# Patient Record
Sex: FEMALE | Race: BLACK OR AFRICAN AMERICAN | NOT HISPANIC OR LATINO | URBAN - METROPOLITAN AREA
[De-identification: names, ages, dates, MRNs, and addresses within clinical notes are randomized per-mention and may not be internally consistent; named-entity substitution may affect disease eponyms.]

---

## 2019-07-24 ENCOUNTER — EMERGENCY (EMERGENCY)
Facility: HOSPITAL | Age: 74
LOS: 1 days | Discharge: HOME | End: 2019-07-24
Attending: EMERGENCY MEDICINE
Payer: MEDICARE

## 2019-07-24 VITALS
TEMPERATURE: 99 F | SYSTOLIC BLOOD PRESSURE: 181 MMHG | HEIGHT: 68 IN | WEIGHT: 190.04 LBS | DIASTOLIC BLOOD PRESSURE: 97 MMHG | HEART RATE: 71 BPM | RESPIRATION RATE: 20 BRPM | OXYGEN SATURATION: 99 %

## 2019-07-24 LAB
ALBUMIN SERPL ELPH-MCNC: 4 G/DL — SIGNIFICANT CHANGE UP (ref 3.3–5.2)
ALP SERPL-CCNC: 55 U/L — SIGNIFICANT CHANGE UP (ref 40–120)
ALT FLD-CCNC: 11 U/L — SIGNIFICANT CHANGE UP
ANION GAP SERPL CALC-SCNC: 12 MMOL/L — SIGNIFICANT CHANGE UP (ref 5–17)
APTT BLD: 31.1 SEC — SIGNIFICANT CHANGE UP (ref 27.5–36.3)
AST SERPL-CCNC: 21 U/L — SIGNIFICANT CHANGE UP
BASOPHILS # BLD AUTO: 0.02 K/UL — SIGNIFICANT CHANGE UP (ref 0–0.2)
BASOPHILS NFR BLD AUTO: 0.5 % — SIGNIFICANT CHANGE UP (ref 0–2)
BILIRUB SERPL-MCNC: <0.2 MG/DL — LOW (ref 0.4–2)
BUN SERPL-MCNC: 22 MG/DL — HIGH (ref 8–20)
CALCIUM SERPL-MCNC: 9.1 MG/DL — SIGNIFICANT CHANGE UP (ref 8.6–10.2)
CHLORIDE SERPL-SCNC: 103 MMOL/L — SIGNIFICANT CHANGE UP (ref 98–107)
CO2 SERPL-SCNC: 25 MMOL/L — SIGNIFICANT CHANGE UP (ref 22–29)
CREAT SERPL-MCNC: 0.91 MG/DL — SIGNIFICANT CHANGE UP (ref 0.5–1.3)
EOSINOPHIL # BLD AUTO: 0.07 K/UL — SIGNIFICANT CHANGE UP (ref 0–0.5)
EOSINOPHIL NFR BLD AUTO: 1.8 % — SIGNIFICANT CHANGE UP (ref 0–6)
GLUCOSE SERPL-MCNC: 113 MG/DL — SIGNIFICANT CHANGE UP (ref 70–115)
HCT VFR BLD CALC: 35.4 % — SIGNIFICANT CHANGE UP (ref 34.5–45)
HGB BLD-MCNC: 11.1 G/DL — LOW (ref 11.5–15.5)
IMM GRANULOCYTES NFR BLD AUTO: 0 % — SIGNIFICANT CHANGE UP (ref 0–1.5)
INR BLD: 0.95 RATIO — SIGNIFICANT CHANGE UP (ref 0.88–1.16)
LYMPHOCYTES # BLD AUTO: 1.71 K/UL — SIGNIFICANT CHANGE UP (ref 1–3.3)
LYMPHOCYTES # BLD AUTO: 43.1 % — SIGNIFICANT CHANGE UP (ref 13–44)
MAGNESIUM SERPL-MCNC: 1.8 MG/DL — SIGNIFICANT CHANGE UP (ref 1.6–2.6)
MCHC RBC-ENTMCNC: 28.2 PG — SIGNIFICANT CHANGE UP (ref 27–34)
MCHC RBC-ENTMCNC: 31.4 GM/DL — LOW (ref 32–36)
MCV RBC AUTO: 89.8 FL — SIGNIFICANT CHANGE UP (ref 80–100)
MONOCYTES # BLD AUTO: 0.5 K/UL — SIGNIFICANT CHANGE UP (ref 0–0.9)
MONOCYTES NFR BLD AUTO: 12.6 % — SIGNIFICANT CHANGE UP (ref 2–14)
NEUTROPHILS # BLD AUTO: 1.67 K/UL — LOW (ref 1.8–7.4)
NEUTROPHILS NFR BLD AUTO: 42 % — LOW (ref 43–77)
NT-PROBNP SERPL-SCNC: 120 PG/ML — SIGNIFICANT CHANGE UP (ref 0–300)
PLATELET # BLD AUTO: 194 K/UL — SIGNIFICANT CHANGE UP (ref 150–400)
POTASSIUM SERPL-MCNC: 3.7 MMOL/L — SIGNIFICANT CHANGE UP (ref 3.5–5.3)
POTASSIUM SERPL-SCNC: 3.7 MMOL/L — SIGNIFICANT CHANGE UP (ref 3.5–5.3)
PROT SERPL-MCNC: 7.4 G/DL — SIGNIFICANT CHANGE UP (ref 6.6–8.7)
PROTHROM AB SERPL-ACNC: 10.9 SEC — SIGNIFICANT CHANGE UP (ref 10–12.9)
RBC # BLD: 3.94 M/UL — SIGNIFICANT CHANGE UP (ref 3.8–5.2)
RBC # FLD: 13 % — SIGNIFICANT CHANGE UP (ref 10.3–14.5)
SODIUM SERPL-SCNC: 140 MMOL/L — SIGNIFICANT CHANGE UP (ref 135–145)
TROPONIN T SERPL-MCNC: <0.01 NG/ML — SIGNIFICANT CHANGE UP (ref 0–0.06)
WBC # BLD: 3.97 K/UL — SIGNIFICANT CHANGE UP (ref 3.8–10.5)
WBC # FLD AUTO: 3.97 K/UL — SIGNIFICANT CHANGE UP (ref 3.8–10.5)

## 2019-07-24 PROCEDURE — 74174 CTA ABD&PLVS W/CONTRAST: CPT | Mod: 26

## 2019-07-24 PROCEDURE — 71275 CT ANGIOGRAPHY CHEST: CPT | Mod: 26

## 2019-07-24 PROCEDURE — 93010 ELECTROCARDIOGRAM REPORT: CPT

## 2019-07-24 PROCEDURE — 71045 X-RAY EXAM CHEST 1 VIEW: CPT | Mod: 26

## 2019-07-24 PROCEDURE — 99285 EMERGENCY DEPT VISIT HI MDM: CPT

## 2019-07-24 NOTE — ED ADULT NURSE NOTE - OBJECTIVE STATEMENT
pt BIBA for reports of sudden onset chest pain PTA radiating to her shoulders. on arrival pt reports pain has gone away. no cardiac hx, denies stents. pt hypertensive, hx of, took meds today. no SOB, no fevers, even and unlabored resps present on exam. denies epigastric pain, denies nausea/vomiting.

## 2019-07-24 NOTE — ED PROVIDER NOTE - OBJECTIVE STATEMENT
Pt is a 74 yo F co chest/back pain.  PMHx significant for htn. Pt states that she was laying down this evening when she had sudden onset of sharp stabbing back pain that radiated to her chest. Pt states that the pain lasted approx. 30 min and resolved spontaneously. Pain was associated with R arm weakness/heaviness. Pt states that for the past 2 months she has had intermittent sob. no n/v. no fever/chills. no cough. no other complaints.

## 2019-07-24 NOTE — ED PROVIDER NOTE - CLINICAL SUMMARY MEDICAL DECISION MAKING FREE TEXT BOX
labs and imaging reviewed.  no dissection on CT.  case d/w LISET [place].  will put in cdu for serial enzymes and cardio consult.

## 2019-07-24 NOTE — ED ADULT TRIAGE NOTE - CHIEF COMPLAINT QUOTE
pt a+ox3, BIBA c/o chest pain. pt states she was laying on the couch when sudden onset of upper back pain radiating around chest occur. pt states pain has since subsided, denies SOB or diff breathing.

## 2019-07-24 NOTE — ED PROVIDER NOTE - NS ED ROS FT
No fever/chills, No photophobia/eye pain/changes in vision, No ear pain/sore throat/dysphagia, (+) chest pain no palpitations, no SOB/cough/wheeze/stridor, No abdominal pain, No N/V/D, no dysuria/frequency/discharge, No neck pain (+)back pain, no rash, (+) R arm weakness

## 2019-07-25 VITALS
SYSTOLIC BLOOD PRESSURE: 153 MMHG | OXYGEN SATURATION: 97 % | HEART RATE: 69 BPM | DIASTOLIC BLOOD PRESSURE: 67 MMHG | TEMPERATURE: 99 F | RESPIRATION RATE: 18 BRPM

## 2019-07-25 LAB
TROPONIN T SERPL-MCNC: <0.01 NG/ML — SIGNIFICANT CHANGE UP (ref 0–0.06)
TROPONIN T SERPL-MCNC: <0.01 NG/ML — SIGNIFICANT CHANGE UP (ref 0–0.06)

## 2019-07-25 PROCEDURE — 93010 ELECTROCARDIOGRAM REPORT: CPT | Mod: 76

## 2019-07-25 PROCEDURE — A9500: CPT

## 2019-07-25 PROCEDURE — C8929: CPT

## 2019-07-25 PROCEDURE — 99285 EMERGENCY DEPT VISIT HI MDM: CPT | Mod: 25

## 2019-07-25 PROCEDURE — 71275 CT ANGIOGRAPHY CHEST: CPT

## 2019-07-25 PROCEDURE — 94660 CPAP INITIATION&MGMT: CPT

## 2019-07-25 PROCEDURE — 85610 PROTHROMBIN TIME: CPT

## 2019-07-25 PROCEDURE — 78452 HT MUSCLE IMAGE SPECT MULT: CPT | Mod: 26

## 2019-07-25 PROCEDURE — 84484 ASSAY OF TROPONIN QUANT: CPT

## 2019-07-25 PROCEDURE — 80053 COMPREHEN METABOLIC PANEL: CPT

## 2019-07-25 PROCEDURE — 99285 EMERGENCY DEPT VISIT HI MDM: CPT

## 2019-07-25 PROCEDURE — G0378: CPT

## 2019-07-25 PROCEDURE — 93005 ELECTROCARDIOGRAM TRACING: CPT

## 2019-07-25 PROCEDURE — 83880 ASSAY OF NATRIURETIC PEPTIDE: CPT

## 2019-07-25 PROCEDURE — 36415 COLL VENOUS BLD VENIPUNCTURE: CPT

## 2019-07-25 PROCEDURE — 93018 CV STRESS TEST I&R ONLY: CPT

## 2019-07-25 PROCEDURE — 78452 HT MUSCLE IMAGE SPECT MULT: CPT

## 2019-07-25 PROCEDURE — 93306 TTE W/DOPPLER COMPLETE: CPT | Mod: 26

## 2019-07-25 PROCEDURE — 85730 THROMBOPLASTIN TIME PARTIAL: CPT

## 2019-07-25 PROCEDURE — 93016 CV STRESS TEST SUPVJ ONLY: CPT

## 2019-07-25 PROCEDURE — 71045 X-RAY EXAM CHEST 1 VIEW: CPT

## 2019-07-25 PROCEDURE — 85027 COMPLETE CBC AUTOMATED: CPT

## 2019-07-25 PROCEDURE — 93017 CV STRESS TEST TRACING ONLY: CPT

## 2019-07-25 PROCEDURE — 99234 HOSP IP/OBS SM DT SF/LOW 45: CPT

## 2019-07-25 PROCEDURE — 74174 CTA ABD&PLVS W/CONTRAST: CPT

## 2019-07-25 PROCEDURE — 83735 ASSAY OF MAGNESIUM: CPT

## 2019-07-25 RX ORDER — MONTELUKAST 4 MG/1
10 TABLET, CHEWABLE ORAL DAILY
Refills: 0 | Status: DISCONTINUED | OUTPATIENT
Start: 2019-07-25 | End: 2019-07-29

## 2019-07-25 RX ORDER — HYDROCHLOROTHIAZIDE 25 MG
25 TABLET ORAL DAILY
Refills: 0 | Status: DISCONTINUED | OUTPATIENT
Start: 2019-07-25 | End: 2019-07-29

## 2019-07-25 RX ADMIN — MONTELUKAST 10 MILLIGRAM(S): 4 TABLET, CHEWABLE ORAL at 15:29

## 2019-07-25 RX ADMIN — Medication 25 MILLIGRAM(S): at 07:32

## 2019-07-25 NOTE — ED ADULT NURSE REASSESSMENT NOTE - NS ED NURSE REASSESS COMMENT FT1
pt taken to CT at this time. pt with no change in status since arrival. no reports of pain.
pt to stay in observation overnight. aware of plan of care. IV site patent, repeat labs to be drawn. pt understanding of plan of care. no distress, even and unlabored resps present. skin warm dry and intact. AOX3, report handed off to NESTOR, RN at this time. assumed pt care.
verbal report received from ED RN [place], pt now in nuclear medicine, awaiting arrival to unit
Handoff received from offgoing RN. Charting as noted. Pt. received AxOx4, resting in stretcher, in NSR on cardiac monitor. Pt. in NAD. Vital signs stable and as charted. Pt. currently under OBSERVATION in ED, pt aware of current status. Pt. requesting food and PO fluids, as per LISET Graves to keep pt NPO pending possible AM stress test. Pt. verbalized understanding. Pt. offers no medical complaints at this time. Pt. safety and comfort measures maintained.
Pt received in the stretcher resting comfortably, non more chest pain reported, awaiting stress test this am, pt instructed to maintained NPO status , VSS, no distress noted, will continue to monitor

## 2019-07-25 NOTE — CONSULT NOTE ADULT - ASSESSMENT
73F hx HTN presented to ED with c/o R shoulder pain started last night involved back of her shoulder, was associated with R arm weakness, then pain evolved into anterior aspect of her chest to R and mid center, was severe, felt like sharp, lasted 10-15 minutes, was nonexertional, pt called EMS and brought to ED. Denied associated dyspnea orthopnea, palpitation sweating, nausea, vomiting Denied modifying factors. Pt states she had similar episode in 2015 and had PARNELL which was unrevealing Denied fever, chills, cough, dyspnea, orthopnea PND, edema, palpitation nausea, vomiting, hematuria, melena, syncope near syncope.    Chest Pain  EKG No acute changes  Trop neg  CTA no aortic dissection  Echo and NST today      HTN   Monitor BP  Cont meds

## 2019-07-25 NOTE — ED ADULT NURSE REASSESSMENT NOTE - COMFORT CARE
ambulated to bathroom
plan of care explained/wait time explained
plan of care explained/repositioned/treatment delay explained/wait time explained/warm blanket provided/assisted to bathroom

## 2019-07-25 NOTE — ED ADULT NURSE REASSESSMENT NOTE - GENERAL PATIENT STATE
improvement verbalized/smiling/interactive/comfortable appearance/cooperative/resting/sleeping
comfortable appearance

## 2019-07-25 NOTE — ED CDU PROVIDER DISPOSITION NOTE - CLINICAL COURSE
Pt is a 74 yo F co chest/back pain.  PMHx significant for htn. Pt states that she was laying down last evening when she had sudden onset of sharp stabbing back pain that radiated to her chest. Pt states that the pain lasted approx. 30 min and resolved spontaneously. Pain was associated with R arm weakness/heaviness. Pt states that for the past 2 months she has had intermittent sob.   Troponin negative x 3, TTE no significant valve disease, NST no ischemia, CTA no dissection.  Will d/c home f/u cardiology.
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
## 2019-07-25 NOTE — ED CDU PROVIDER INITIAL DAY NOTE - ATTENDING CONTRIBUTION TO CARE
I performed a face to face history and physical exam of the patient and discussed their management with the resident/ACP. I reviewed the resident/ACP's note and agree with the documented findings and plan of care.    Pt with atypical chest pain starting this morning.  initial workup negative.  will put in cdu for serial enzymes and cardio consult.

## 2019-07-25 NOTE — ED CDU PROVIDER INITIAL DAY NOTE - MEDICAL DECISION MAKING DETAILS
74 yo F co chest/back pain.  PMHx significant for htn. no active chest pain at this time, trend trops, cta neg, ss cardio consult in am

## 2020-07-29 ENCOUNTER — EMERGENCY (EMERGENCY)
Facility: HOSPITAL | Age: 75
LOS: 1 days | Discharge: HOME | End: 2020-07-29
Attending: EMERGENCY MEDICINE
Payer: MEDICARE

## 2020-07-29 VITALS
OXYGEN SATURATION: 97 % | DIASTOLIC BLOOD PRESSURE: 80 MMHG | HEART RATE: 82 BPM | SYSTOLIC BLOOD PRESSURE: 167 MMHG | TEMPERATURE: 98 F | RESPIRATION RATE: 18 BRPM

## 2020-07-29 VITALS
OXYGEN SATURATION: 100 % | HEART RATE: 82 BPM | TEMPERATURE: 98 F | RESPIRATION RATE: 18 BRPM | SYSTOLIC BLOOD PRESSURE: 161 MMHG | DIASTOLIC BLOOD PRESSURE: 77 MMHG

## 2020-07-29 LAB
ALBUMIN SERPL ELPH-MCNC: 4.1 G/DL — SIGNIFICANT CHANGE UP (ref 3.3–5.2)
ALP SERPL-CCNC: 55 U/L — SIGNIFICANT CHANGE UP (ref 40–120)
ALT FLD-CCNC: 15 U/L — SIGNIFICANT CHANGE UP
ANION GAP SERPL CALC-SCNC: 11 MMOL/L — SIGNIFICANT CHANGE UP (ref 5–17)
ANISOCYTOSIS BLD QL: SLIGHT — SIGNIFICANT CHANGE UP
APTT BLD: 30.8 SEC — SIGNIFICANT CHANGE UP (ref 27.5–35.5)
AST SERPL-CCNC: 27 U/L — SIGNIFICANT CHANGE UP
BASOPHILS # BLD AUTO: 0 K/UL — SIGNIFICANT CHANGE UP (ref 0–0.2)
BASOPHILS NFR BLD AUTO: 0 % — SIGNIFICANT CHANGE UP (ref 0–2)
BILIRUB SERPL-MCNC: 0.2 MG/DL — LOW (ref 0.4–2)
BUN SERPL-MCNC: 23 MG/DL — HIGH (ref 8–20)
CALCIUM SERPL-MCNC: 9.5 MG/DL — SIGNIFICANT CHANGE UP (ref 8.6–10.2)
CHLORIDE SERPL-SCNC: 101 MMOL/L — SIGNIFICANT CHANGE UP (ref 98–107)
CO2 SERPL-SCNC: 28 MMOL/L — SIGNIFICANT CHANGE UP (ref 22–29)
CREAT SERPL-MCNC: 1.12 MG/DL — SIGNIFICANT CHANGE UP (ref 0.5–1.3)
ELLIPTOCYTES BLD QL SMEAR: SLIGHT — SIGNIFICANT CHANGE UP
EOSINOPHIL # BLD AUTO: 0.06 K/UL — SIGNIFICANT CHANGE UP (ref 0–0.5)
EOSINOPHIL NFR BLD AUTO: 2 % — SIGNIFICANT CHANGE UP (ref 0–6)
GLUCOSE SERPL-MCNC: 102 MG/DL — HIGH (ref 70–99)
HCT VFR BLD CALC: 36 % — SIGNIFICANT CHANGE UP (ref 34.5–45)
HGB BLD-MCNC: 11.4 G/DL — LOW (ref 11.5–15.5)
INR BLD: 0.97 RATIO — SIGNIFICANT CHANGE UP (ref 0.88–1.16)
LYMPHOCYTES # BLD AUTO: 1.5 K/UL — SIGNIFICANT CHANGE UP (ref 1–3.3)
LYMPHOCYTES # BLD AUTO: 48 % — HIGH (ref 13–44)
MAGNESIUM SERPL-MCNC: 1.9 MG/DL — SIGNIFICANT CHANGE UP (ref 1.6–2.6)
MCHC RBC-ENTMCNC: 28.5 PG — SIGNIFICANT CHANGE UP (ref 27–34)
MCHC RBC-ENTMCNC: 31.7 GM/DL — LOW (ref 32–36)
MCV RBC AUTO: 90 FL — SIGNIFICANT CHANGE UP (ref 80–100)
MONOCYTES # BLD AUTO: 0.34 K/UL — SIGNIFICANT CHANGE UP (ref 0–0.9)
MONOCYTES NFR BLD AUTO: 11 % — SIGNIFICANT CHANGE UP (ref 2–14)
NEUTROPHILS # BLD AUTO: 1.19 K/UL — LOW (ref 1.8–7.4)
NEUTROPHILS NFR BLD AUTO: 38 % — LOW (ref 43–77)
NRBC # BLD: 0 /100 — SIGNIFICANT CHANGE UP (ref 0–0)
PLAT MORPH BLD: NORMAL — SIGNIFICANT CHANGE UP
PLATELET # BLD AUTO: 215 K/UL — SIGNIFICANT CHANGE UP (ref 150–400)
POIKILOCYTOSIS BLD QL AUTO: SLIGHT — SIGNIFICANT CHANGE UP
POTASSIUM SERPL-MCNC: 4.1 MMOL/L — SIGNIFICANT CHANGE UP (ref 3.5–5.3)
POTASSIUM SERPL-SCNC: 4.1 MMOL/L — SIGNIFICANT CHANGE UP (ref 3.5–5.3)
PROT SERPL-MCNC: 7.5 G/DL — SIGNIFICANT CHANGE UP (ref 6.6–8.7)
PROTHROM AB SERPL-ACNC: 11.3 SEC — SIGNIFICANT CHANGE UP (ref 10.6–13.6)
RBC # BLD: 4 M/UL — SIGNIFICANT CHANGE UP (ref 3.8–5.2)
RBC # FLD: 12.7 % — SIGNIFICANT CHANGE UP (ref 10.3–14.5)
RBC BLD AUTO: ABNORMAL
SODIUM SERPL-SCNC: 140 MMOL/L — SIGNIFICANT CHANGE UP (ref 135–145)
TROPONIN T SERPL-MCNC: <0.01 NG/ML — SIGNIFICANT CHANGE UP (ref 0–0.06)
VARIANT LYMPHS # BLD: 1 % — SIGNIFICANT CHANGE UP (ref 0–6)
WBC # BLD: 3.13 K/UL — LOW (ref 3.8–10.5)
WBC # FLD AUTO: 3.13 K/UL — LOW (ref 3.8–10.5)

## 2020-07-29 PROCEDURE — 70496 CT ANGIOGRAPHY HEAD: CPT | Mod: 26

## 2020-07-29 PROCEDURE — 70498 CT ANGIOGRAPHY NECK: CPT

## 2020-07-29 PROCEDURE — 70498 CT ANGIOGRAPHY NECK: CPT | Mod: 26

## 2020-07-29 PROCEDURE — 99284 EMERGENCY DEPT VISIT MOD MDM: CPT

## 2020-07-29 PROCEDURE — 80053 COMPREHEN METABOLIC PANEL: CPT

## 2020-07-29 PROCEDURE — 36415 COLL VENOUS BLD VENIPUNCTURE: CPT

## 2020-07-29 PROCEDURE — 99284 EMERGENCY DEPT VISIT MOD MDM: CPT | Mod: 25

## 2020-07-29 PROCEDURE — 85730 THROMBOPLASTIN TIME PARTIAL: CPT

## 2020-07-29 PROCEDURE — 70496 CT ANGIOGRAPHY HEAD: CPT

## 2020-07-29 PROCEDURE — 85610 PROTHROMBIN TIME: CPT

## 2020-07-29 PROCEDURE — 85027 COMPLETE CBC AUTOMATED: CPT

## 2020-07-29 PROCEDURE — 93005 ELECTROCARDIOGRAM TRACING: CPT

## 2020-07-29 PROCEDURE — 93010 ELECTROCARDIOGRAM REPORT: CPT

## 2020-07-29 PROCEDURE — 84484 ASSAY OF TROPONIN QUANT: CPT

## 2020-07-29 PROCEDURE — 83735 ASSAY OF MAGNESIUM: CPT

## 2020-07-29 RX ORDER — MECLIZINE HCL 12.5 MG
1 TABLET ORAL
Qty: 21 | Refills: 0
Start: 2020-07-29 | End: 2020-08-04

## 2020-07-29 RX ORDER — MECLIZINE HCL 12.5 MG
50 TABLET ORAL ONCE
Refills: 0 | Status: COMPLETED | OUTPATIENT
Start: 2020-07-29 | End: 2020-07-29

## 2020-07-29 RX ADMIN — Medication 50 MILLIGRAM(S): at 06:51

## 2020-07-29 NOTE — ED ADULT NURSE NOTE - NSIMPLEMENTINTERV_GEN_ALL_ED
Implemented All Universal Safety Interventions:  [place] to call system. Call bell, personal items and telephone within reach. Instruct patient to call for assistance. Room bathroom lighting operational. Non-slip footwear when patient is off stretcher. Physically safe environment: no spills, clutter or unnecessary equipment. Stretcher in lowest position, wheels locked, appropriate side rails in place.

## 2020-07-29 NOTE — ED ADULT TRIAGE NOTE - CHIEF COMPLAINT QUOTE
patient states that she woke up this morning dizzy, states that when she got up to go to the bathroom the dizziness became worse, patient states that she has a history of vertigo. patient states that she still feels dizzy, denies any headache or changes in vision

## 2020-07-29 NOTE — ED PROVIDER NOTE - OBJECTIVE STATEMENT
73yo female with pmh HTN and possible veritgo in the past presents with dizziness. Pt states last night around 6pm she was feeling well, later in the night was feeling "off" and woke up this morning with room spinning sensation, worse with movement. PT states it was worse when attempting to go to the bathroom, pt denies nausea, vomiting. PT states a couple of months ago has similar sensation did not go to the doc took "allergy pill" and episode resolved. Pt did not take her bp med this am. Pt denies fevers/chills, ha, loc, focal neuro deficits, cp/sob/palp, cough, abd pain/n/v/d, urinary symptoms, recent travel and sick contacts.

## 2020-07-29 NOTE — ED ADULT NURSE NOTE - CHPI ED NUR SYMPTOMS NEG
no fever/no blurred vision/no weakness/no confusion/no loss of consciousness/no change in level of consciousness/no nausea/no numbness/no vomiting

## 2020-07-29 NOTE — ED PROVIDER NOTE - PROGRESS NOTE DETAILS
patient feels better; neuro intact; ambulatory  results d/w patient and need for followup  paient verbalized understanging  -md sandra

## 2020-07-29 NOTE — ED PROVIDER NOTE - PATIENT PORTAL LINK FT
You can access the FollowMyHealth Patient Portal offered by [facility] by registering at the following website: [email or web address]. By joining [facility]’s FollowMyHealth portal, you will also be able to view your health information using other applications (apps) compatible with our system.

## 2020-07-29 NOTE — ED ADULT NURSE REASSESSMENT NOTE - NS ED NURSE REASSESS COMMENT FT1
Patient received awake and alert x4 in cart, patient on cardiac monitor reads 81 NSR, patient O2 sat reads 99% on RA, patient reports dizziness, patient has no labored breathing, is in no acute distress.

## 2020-07-29 NOTE — ED PROVIDER NOTE - CLINICAL SUMMARY MEDICAL DECISION MAKING FREE TEXT BOX
73yo female with pmh HTN and possible vertigo in the past presents with dizziness - pt with no focal deficit on exam, symptoms worsening with movement - labs, CT, meclizine

## 2020-07-29 NOTE — ED ADULT NURSE NOTE - OBJECTIVE STATEMENT
Patient is alert and oriented x3 c/o headache with dizziness when patient awoke this am getting up to use the bathroom. denies headache or blurred vision. moves all extremities with out difficulty. denies numbness or tingling to any or all extremities. denies chest pain or sob. lung sounds clear through out. abd soft non tender. color normal for ethnicity.

## 2020-07-29 NOTE — ED PROVIDER NOTE - NS ED ROS FT
Review of Systems:  	•	CONSTITUTIONAL - no fever, no diaphoresis, no weight change  	•	SKIN - no rash  	•	HEMATOLOGIC - no bleeding, no bruising  	•	EYES - no eye pain, no blurred vision  	•	ENT - no change in hearing, no pain  	•	RESPIRATORY - no shortness of breath, no cough  	•	CARDIAC - no chest pain, no palpitations  	•	GI - no abd pain, no nausea, no vomiting, no diarrhea, no constipation, no bleeding  	•	GENITO-URINARY - no vaginal bleeding, no discharge, no dysuria; no hematuria  	•	ENDO - no polydypsia, no polyurea, no heat/no cold intolerance  	•	MUSCULOSKELETAL - no joint paint, no swelling, no redness  	•	NEUROLOGIC - +dizziness, no weakness, no headache, no anesthesia, no paresthesias  	•	PSYCH - no anxiety, non suicidal, non homicidal, no hallucination, no depression  	•	ALLERGY - no rhinitis

## 2020-07-29 NOTE — ED PROVIDER NOTE - PHYSICAL EXAMINATION
Const: Awake, alert and oriented. In no acute distress. Well appearing.  HEENT: NC/AT. Moist mucous membranes.  Eyes: No scleral icterus. EOMI.  Neck:. Soft and supple. Full ROM without pain.  Cardiac: Regular rate and regular rhythm. +S1/S2. Peripheral pulses 2+ and symmetric. No LE edema.  Resp: Speaking in full sentences. No evidence of respiratory distress. No wheezes, rales or rhonchi.  Abd: Soft, non-tender, non-distended. Normal bowel sounds in all 4 quadrants. No guarding or rebound.  Back: Spine midline and non-tender. No CVAT.  Skin: No rashes, abrasions or lacerations.  Lymph: No cervical lymphadenopathy.  Neuro: Awake, alert & oriented x 3. Moves all extremities symmetrically. follows commands, motor mazin upper and lower ext 5/5, sensory symm and intact CN 2-12 grossly intact, no ataxia, no nystagmus, no dysmetria, no ddk, symm mazin, no pronator drift

## 2021-08-17 ENCOUNTER — INPATIENT (INPATIENT)
Facility: HOSPITAL | Age: 76
LOS: 2 days | Discharge: HOME | DRG: 305 | End: 2021-08-20
Attending: INTERNAL MEDICINE | Admitting: STUDENT IN AN ORGANIZED HEALTH CARE EDUCATION/TRAINING PROGRAM
Payer: COMMERCIAL

## 2021-08-17 VITALS
DIASTOLIC BLOOD PRESSURE: 82 MMHG | RESPIRATION RATE: 18 BRPM | OXYGEN SATURATION: 96 % | HEIGHT: 68 IN | TEMPERATURE: 98 F | WEIGHT: 197.53 LBS | SYSTOLIC BLOOD PRESSURE: 179 MMHG | HEART RATE: 75 BPM

## 2021-08-17 DIAGNOSIS — Z96.642 PRESENCE OF LEFT ARTIFICIAL HIP JOINT: Chronic | ICD-10-CM

## 2021-08-17 DIAGNOSIS — J44.1 CHRONIC OBSTRUCTIVE PULMONARY DISEASE WITH (ACUTE) EXACERBATION: Chronic | ICD-10-CM

## 2021-08-17 DIAGNOSIS — Z87.09 PERSONAL HISTORY OF OTHER DISEASES OF THE RESPIRATORY SYSTEM: Chronic | ICD-10-CM

## 2021-08-17 DIAGNOSIS — R42 DIZZINESS AND GIDDINESS: ICD-10-CM

## 2021-08-17 LAB
A1C WITH ESTIMATED AVERAGE GLUCOSE RESULT: 5.3 % — SIGNIFICANT CHANGE UP (ref 4–5.6)
ALBUMIN SERPL ELPH-MCNC: 4 G/DL — SIGNIFICANT CHANGE UP (ref 3.3–5.2)
ALP SERPL-CCNC: 66 U/L — SIGNIFICANT CHANGE UP (ref 40–120)
ALT FLD-CCNC: 12 U/L — SIGNIFICANT CHANGE UP
ANION GAP SERPL CALC-SCNC: 8 MMOL/L — SIGNIFICANT CHANGE UP (ref 5–17)
APTT BLD: 31.7 SEC — SIGNIFICANT CHANGE UP (ref 27.5–35.5)
AST SERPL-CCNC: 24 U/L — SIGNIFICANT CHANGE UP
BASE EXCESS BLDV CALC-SCNC: 3.1 MMOL/L — HIGH (ref -2–3)
BASOPHILS # BLD AUTO: 0.03 K/UL — SIGNIFICANT CHANGE UP (ref 0–0.2)
BASOPHILS NFR BLD AUTO: 0.9 % — SIGNIFICANT CHANGE UP (ref 0–2)
BILIRUB SERPL-MCNC: 0.3 MG/DL — LOW (ref 0.4–2)
BUN SERPL-MCNC: 18.3 MG/DL — SIGNIFICANT CHANGE UP (ref 8–20)
CALCIUM SERPL-MCNC: 9.2 MG/DL — SIGNIFICANT CHANGE UP (ref 8.6–10.2)
CHLORIDE SERPL-SCNC: 104 MMOL/L — SIGNIFICANT CHANGE UP (ref 98–107)
CHOLEST SERPL-MCNC: 244 MG/DL — HIGH
CO2 SERPL-SCNC: 27 MMOL/L — SIGNIFICANT CHANGE UP (ref 22–29)
CREAT SERPL-MCNC: 0.82 MG/DL — SIGNIFICANT CHANGE UP (ref 0.5–1.3)
EOSINOPHIL # BLD AUTO: 0.05 K/UL — SIGNIFICANT CHANGE UP (ref 0–0.5)
EOSINOPHIL NFR BLD AUTO: 1.7 % — SIGNIFICANT CHANGE UP (ref 0–6)
ESTIMATED AVERAGE GLUCOSE: 105 MG/DL — SIGNIFICANT CHANGE UP (ref 68–114)
GAS PNL BLDV: SIGNIFICANT CHANGE UP
GIANT PLATELETS BLD QL SMEAR: PRESENT — SIGNIFICANT CHANGE UP
GLUCOSE SERPL-MCNC: 94 MG/DL — SIGNIFICANT CHANGE UP (ref 70–99)
HCO3 BLDV-SCNC: 28 MMOL/L — SIGNIFICANT CHANGE UP (ref 22–29)
HCT VFR BLD CALC: 36.2 % — SIGNIFICANT CHANGE UP (ref 34.5–45)
HDLC SERPL-MCNC: 121 MG/DL — SIGNIFICANT CHANGE UP
HGB BLD-MCNC: 11.5 G/DL — SIGNIFICANT CHANGE UP (ref 11.5–15.5)
INR BLD: 0.98 RATIO — SIGNIFICANT CHANGE UP (ref 0.88–1.16)
LIPID PNL WITH DIRECT LDL SERPL: 128 MG/DL — HIGH
LYMPHOCYTES # BLD AUTO: 1.26 K/UL — SIGNIFICANT CHANGE UP (ref 1–3.3)
LYMPHOCYTES # BLD AUTO: 39.6 % — SIGNIFICANT CHANGE UP (ref 13–44)
MANUAL SMEAR VERIFICATION: SIGNIFICANT CHANGE UP
MCHC RBC-ENTMCNC: 27.8 PG — SIGNIFICANT CHANGE UP (ref 27–34)
MCHC RBC-ENTMCNC: 31.8 GM/DL — LOW (ref 32–36)
MCV RBC AUTO: 87.4 FL — SIGNIFICANT CHANGE UP (ref 80–100)
MONOCYTES # BLD AUTO: 0.47 K/UL — SIGNIFICANT CHANGE UP (ref 0–0.9)
MONOCYTES NFR BLD AUTO: 14.7 % — HIGH (ref 2–14)
NEUTROPHILS # BLD AUTO: 1.29 K/UL — LOW (ref 1.8–7.4)
NEUTROPHILS NFR BLD AUTO: 39.6 % — LOW (ref 43–77)
NEUTS BAND # BLD: 0.9 % — SIGNIFICANT CHANGE UP (ref 0–8)
NON HDL CHOLESTEROL: 123 MG/DL — SIGNIFICANT CHANGE UP
OVALOCYTES BLD QL SMEAR: SLIGHT — SIGNIFICANT CHANGE UP
PCO2 BLDV: 45 MMHG — HIGH (ref 39–42)
PH BLDV: 7.4 — SIGNIFICANT CHANGE UP (ref 7.32–7.43)
PLAT MORPH BLD: NORMAL — SIGNIFICANT CHANGE UP
PLATELET # BLD AUTO: 199 K/UL — SIGNIFICANT CHANGE UP (ref 150–400)
PO2 BLDV: 54 MMHG — HIGH (ref 25–45)
POIKILOCYTOSIS BLD QL AUTO: SLIGHT — SIGNIFICANT CHANGE UP
POLYCHROMASIA BLD QL SMEAR: SLIGHT — SIGNIFICANT CHANGE UP
POTASSIUM SERPL-MCNC: 4.3 MMOL/L — SIGNIFICANT CHANGE UP (ref 3.5–5.3)
POTASSIUM SERPL-SCNC: 4.3 MMOL/L — SIGNIFICANT CHANGE UP (ref 3.5–5.3)
PROT SERPL-MCNC: 7.5 G/DL — SIGNIFICANT CHANGE UP (ref 6.6–8.7)
PROTHROM AB SERPL-ACNC: 11.4 SEC — SIGNIFICANT CHANGE UP (ref 10.6–13.6)
RAPID RVP RESULT: SIGNIFICANT CHANGE UP
RBC # BLD: 4.14 M/UL — SIGNIFICANT CHANGE UP (ref 3.8–5.2)
RBC # FLD: 13.7 % — SIGNIFICANT CHANGE UP (ref 10.3–14.5)
RBC BLD AUTO: ABNORMAL
SAO2 % BLDV: 88.2 % — SIGNIFICANT CHANGE UP
SARS-COV-2 RNA SPEC QL NAA+PROBE: SIGNIFICANT CHANGE UP
SODIUM SERPL-SCNC: 139 MMOL/L — SIGNIFICANT CHANGE UP (ref 135–145)
TRIGL SERPL-MCNC: 68 MG/DL — SIGNIFICANT CHANGE UP
TROPONIN T SERPL-MCNC: <0.01 NG/ML — SIGNIFICANT CHANGE UP (ref 0–0.06)
VARIANT LYMPHS # BLD: 2.6 % — SIGNIFICANT CHANGE UP (ref 0–6)
WBC # BLD: 3.18 K/UL — LOW (ref 3.8–10.5)
WBC # FLD AUTO: 3.18 K/UL — LOW (ref 3.8–10.5)

## 2021-08-17 PROCEDURE — 72040 X-RAY EXAM NECK SPINE 2-3 VW: CPT | Mod: 26

## 2021-08-17 PROCEDURE — 0042T: CPT

## 2021-08-17 PROCEDURE — 70498 CT ANGIOGRAPHY NECK: CPT | Mod: 26,MA

## 2021-08-17 PROCEDURE — 70450 CT HEAD/BRAIN W/O DYE: CPT | Mod: 26,59,MA

## 2021-08-17 PROCEDURE — 99284 EMERGENCY DEPT VISIT MOD MDM: CPT

## 2021-08-17 PROCEDURE — 70496 CT ANGIOGRAPHY HEAD: CPT | Mod: 26,MA

## 2021-08-17 PROCEDURE — 93010 ELECTROCARDIOGRAM REPORT: CPT

## 2021-08-17 PROCEDURE — 99223 1ST HOSP IP/OBS HIGH 75: CPT

## 2021-08-17 PROCEDURE — 99285 EMERGENCY DEPT VISIT HI MDM: CPT

## 2021-08-17 PROCEDURE — 93306 TTE W/DOPPLER COMPLETE: CPT | Mod: 26

## 2021-08-17 PROCEDURE — 99222 1ST HOSP IP/OBS MODERATE 55: CPT

## 2021-08-17 PROCEDURE — 99221 1ST HOSP IP/OBS SF/LOW 40: CPT

## 2021-08-17 RX ORDER — AMLODIPINE BESYLATE 2.5 MG/1
5 TABLET ORAL DAILY
Refills: 0 | Status: DISCONTINUED | OUTPATIENT
Start: 2021-08-17 | End: 2021-08-18

## 2021-08-17 RX ORDER — CLOPIDOGREL BISULFATE 75 MG/1
75 TABLET, FILM COATED ORAL ONCE
Refills: 0 | Status: COMPLETED | OUTPATIENT
Start: 2021-08-17 | End: 2021-08-17

## 2021-08-17 RX ORDER — ATORVASTATIN CALCIUM 80 MG/1
40 TABLET, FILM COATED ORAL AT BEDTIME
Refills: 0 | Status: DISCONTINUED | OUTPATIENT
Start: 2021-08-17 | End: 2021-08-20

## 2021-08-17 RX ORDER — ASPIRIN/CALCIUM CARB/MAGNESIUM 324 MG
81 TABLET ORAL DAILY
Refills: 0 | Status: DISCONTINUED | OUTPATIENT
Start: 2021-08-17 | End: 2021-08-20

## 2021-08-17 RX ORDER — ENOXAPARIN SODIUM 100 MG/ML
40 INJECTION SUBCUTANEOUS DAILY
Refills: 0 | Status: DISCONTINUED | OUTPATIENT
Start: 2021-08-17 | End: 2021-08-20

## 2021-08-17 RX ORDER — ASPIRIN/CALCIUM CARB/MAGNESIUM 324 MG
81 TABLET ORAL ONCE
Refills: 0 | Status: COMPLETED | OUTPATIENT
Start: 2021-08-17 | End: 2021-08-17

## 2021-08-17 RX ADMIN — ENOXAPARIN SODIUM 40 MILLIGRAM(S): 100 INJECTION SUBCUTANEOUS at 12:49

## 2021-08-17 RX ADMIN — CLOPIDOGREL BISULFATE 75 MILLIGRAM(S): 75 TABLET, FILM COATED ORAL at 06:41

## 2021-08-17 RX ADMIN — AMLODIPINE BESYLATE 5 MILLIGRAM(S): 2.5 TABLET ORAL at 12:49

## 2021-08-17 RX ADMIN — Medication 81 MILLIGRAM(S): at 06:41

## 2021-08-17 RX ADMIN — ATORVASTATIN CALCIUM 40 MILLIGRAM(S): 80 TABLET, FILM COATED ORAL at 21:30
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
## 2021-08-17 NOTE — ED PROVIDER NOTE - OBJECTIVE STATEMENT
74 y/o F hx of HTN brought in by EMS from [facility] for dizziness. States that she woke up aprox 3 AM with dizziness, went to bed at 10PM. Endorses having "balance" issues with her R foot that began at some point during the day yesterday and that she "feels off." Per EMS, BP was 200s systolic on arrival, repeat was 180s systolic. Patient endorses taking amlodipine and received a stress test aprox 1 month ago, unsure of results. Denies fever/chills. Denies vision changes. Denies changes in speech. Denies numbness. Denies weakness.

## 2021-08-17 NOTE — ED PROVIDER NOTE - CLINICAL SUMMARY MEDICAL DECISION MAKING FREE TEXT BOX
74 y/o F hx of HTN [facility]. States that she woke up aprox 3 AM with dizziness, went to bed at 10PM. code stroke called on arrival by amb triage. endorses having balance issues during the day yesterday as well, feeling "off" with her R foot. benign neuro exam, no drift in UE or LE, no dysarthria. BP elevated to 200s systolic per EMS. 179/82, possible dizziness from HTN.   FSG on arrival - not hypoglycemia  will get CT head, CTA and perfusion - code stroke protocol and reassess   NIHSS on arrival 0

## 2021-08-17 NOTE — H&P ADULT - NSHPLABSRESULTS_GEN_ALL_CORE
11.5   3.18  )-----------( 199      ( 17 Aug 2021 05:41 )             36.2   08-17    139  |  104  |  18.3  ----------------------------<  94  4.3   |  27.0  |  0.82    Ca    9.2      17 Aug 2021 05:41    TPro  7.5  /  Alb  4.0  /  TBili  0.3<L>  /  DBili  x   /  AST  24  /  ALT  12  /  AlkPhos  66  08-17    < from: CT Angio Neck w/ IV Cont (08.17.21 @ 05:51) >    IMPRESSION:  CT brain perfusion: No ischemic penumbra.  CTA head and neck: Patent cervical and intracranial major arterial vasculature without evidence of abrupt vessel cut off, hemodynamically significant stenosis, or dissection. 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  < end of copied text >    < from: CT Brain Stroke Protocol (08.17.21 @ 05:47) >  IMPRESSION:  No acute intracranial hemorrhage or mass effect.  Findings discussed with Dr. RAMOS on 8/17/2021 5:48 AM with readback.  < end of copied text >

## 2021-08-17 NOTE — ED PROVIDER NOTE - PROGRESS NOTE DETAILS
[place]: spoke to Dr. Salcedo, tele stroke, who recommended ASA/plavix and admission for stroke workup

## 2021-08-17 NOTE — CONSULT NOTE ADULT - ASSESSMENT
74 yo woman with dizziness this morning likely 2/2 elevated BP, however, given sudden elevation of BP would obtain MRI brain w/o contrast to r/o small infarct    Dizziness  MRI brain w/o contrast  Telemetry  TTE  Neurochecks q 4 h  C/w aspirin, statin  Treat BP if SBP >160s, avoid hypotension  BP goal normotensive starting tomorrow morning or if MRI brain w/o contrast done and negative    If MRI brain w/o contrast is unremarkable, no further inpt neurologic workup necessary at this time    Thank you for allowing me to participate in this patient's care    Thank you for al    
75yf presented with dizziness.   ct of the brain and cta obtain due to the code stroke  1.5 mm right ophthalmic ica appreciated  Plan  1. incidental aneurysm finding the patient will not need any neurosurgical intervention at this time  2. pt will be recommended to f/u with Dr Murcia in 6 months   3 Pt can also follow with Dr woods neuro IR for aneurysm

## 2021-08-17 NOTE — H&P ADULT - NSHPPHYSICALEXAM_GEN_ALL_CORE
Vital Signs Last 24 Hrs  T(C): 36.4 (17 Aug 2021 07:42), Max: 36.8 (17 Aug 2021 05:26)  T(F): 97.6 (17 Aug 2021 07:42), Max: 98.3 (17 Aug 2021 05:26)  HR: 65 (17 Aug 2021 07:42) (65 - 90)  BP: 186/84 (17 Aug 2021 07:42) (161/86 - 186/84)  BP(mean): --  RR: 18 (17 Aug 2021 07:42) (16 - 18)  SpO2: 100% (17 Aug 2021 07:42) (96% - 100%) Vital Signs Last 24 Hrs  T(C): 36.4 (17 Aug 2021 07:42), Max: 36.8 (17 Aug 2021 05:26)  T(F): 97.6 (17 Aug 2021 07:42), Max: 98.3 (17 Aug 2021 05:26)  HR: 65 (17 Aug 2021 07:42) (65 - 90)  BP: 186/84 (17 Aug 2021 07:42) (161/86 - 186/84)  BP(mean): --  RR: 18 (17 Aug 2021 07:42) (16 - 18)  SpO2: 100% (17 Aug 2021 07:42) (96% - 100%)    gen: awake alert x 4 , comfortable   heent : perrb/l , neck supple   cvs : s1 , s2 rrr , no m/r   resp : clear b/l   abd : soft , nt , bs nl   ext : trace edema b/l   neuro : rt upper ext 4/5 , rt ll ext 5/5 , left upper and lower ext 5/5, coordination intact , speech clear , no facial droop noted, vision grossly intact.   psych : appropriate affect
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
## 2021-08-17 NOTE — ED PROVIDER NOTE - ATTENDING CONTRIBUTION TO CARE
I, Vaughn Francisco, performed a face to face bedside interview with this patient regarding history of present illness, review of symptoms and relevant past medical, social and family history.  I completed an independent physical examination. I have communicated the patient’s plan of care and disposition with the resident.  75 year old female with PMH htn presents with dizziness. Pt states that at noon yesterday started to feel off balance. Sx have been constant since then, worse with walking, no chets pain, SOB, abd pain, numbness, tingling, weakness  Gen: NAD, well appearing  CV: RRR  Pul: CTA b/l  Abd: Soft, non-distended, non-tender  Neuro: NIH 0  Pt admitted for Sx suggestive of posterior circulation CVA

## 2021-08-17 NOTE — ED ADULT TRIAGE NOTE - CHIEF COMPLAINT QUOTE
went to sleep around 10 pm awoke around 3 am with dizziness and difficulty walking. code stroke called
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
## 2021-08-17 NOTE — ED ADULT NURSE NOTE - NS ED NURSE LEVEL OF CONSCIOUSNESS AFFECT
Orders were extended as requested.  An attempt was made to contact the patient; there was no answer.  A detailed message stating that the orders were extended as he requested.  A message was also left stating that if he had any further questions or concerns, he was to contact the office.    Calm

## 2021-08-17 NOTE — PATIENT PROFILE ADULT - NSTRANSFERBELONGINGSDISPO_GEN_A_NUR
clothing, drawstring bag, cell phone/with patient clothing, drawstring bag, cell phone, 2 rings/with patient

## 2021-08-17 NOTE — ED ADULT NURSE REASSESSMENT NOTE - NS ED NURSE REASSESS COMMENT FT1
Pt received @ 7039, A&OX3, amb ad dolores to BR, denies any pain.  CM In place, NSR.  VSS.  NIHSS = 0.  Abd soft nondistended, nontender, moving all ext well.

## 2021-08-17 NOTE — ED PROVIDER NOTE - PHYSICAL EXAMINATION
General: Well appearing female in no acute distress  HEENT: Normocephalic, atraumatic. Moist mucous membranes. Oropharynx clear. No lymphadenopathy.  Eyes: No scleral icterus. EOMI. SERVANDO.  Neck:. Soft and supple. Full ROM without pain. No midline tenderness  Cardiac: Regular rate and regular rhythm. No murmurs, rubs, gallops. Peripheral pulses 2+ and symmetric. No LE edema.  Resp: Lungs CTAB. Speaking in full sentences. No wheezes, rales or rhonchi.  Abd: Soft, non-tender, non-distended. No guarding or rebound. No scars, masses, or lesions.  Back: Spine midline and non-tender. No CVA tenderness.    Skin: No rashes, abrasions, or lacerations.  Neuro: AO x 3. Moves all extremities symmetrically. Motor strength and sensation grossly intact. No pronator drift in UE or LE. no facial droop.

## 2021-08-17 NOTE — ED PROVIDER NOTE - NS ED ROS FT
General: Denies fever, chills  HEENT: Denies sensory changes, sore throat  Neck: Denies neck pain, neck stiffness  Resp: Denies coughing, SOB  Cardiovascular: Denies CP, palpitations, LE edema  GI: Denies nausea, vomiting, abdominal pain, diarrhea, constipation, blood in stool  : Denies dysuria, hematuria, frequency, incontinence  MSK: Denies back pain  Neuro: Denies HA,+ dizziness, Denies numbness, weakness  Skin: Denies rashes.

## 2021-08-17 NOTE — ED ADULT NURSE NOTE - GLASGOW COMA SCALE: EYE OPENING
CMA returned call to patient. Made patient aware that we are not seeing any non essential patients. Advised patient we will be seeing patients hopefully soon in May and we can get her in to discuss her options moving forward. Patient stated that her PCP RX her pain meds and they are not working. Staff recommended patient reach out to her PCP to discuss other options until we can see her. Patient verbalized understanding.    (E4) spontaneous

## 2021-08-18 ENCOUNTER — TRANSCRIPTION ENCOUNTER (OUTPATIENT)
Age: 76
End: 2021-08-18

## 2021-08-18 LAB
ANION GAP SERPL CALC-SCNC: 9 MMOL/L — SIGNIFICANT CHANGE UP (ref 5–17)
BUN SERPL-MCNC: 15.3 MG/DL — SIGNIFICANT CHANGE UP (ref 8–20)
CALCIUM SERPL-MCNC: 9 MG/DL — SIGNIFICANT CHANGE UP (ref 8.6–10.2)
CHLORIDE SERPL-SCNC: 103 MMOL/L — SIGNIFICANT CHANGE UP (ref 98–107)
CO2 SERPL-SCNC: 29 MMOL/L — SIGNIFICANT CHANGE UP (ref 22–29)
COVID-19 SPIKE DOMAIN AB INTERP: POSITIVE
COVID-19 SPIKE DOMAIN ANTIBODY RESULT: >250 U/ML — HIGH
CREAT SERPL-MCNC: 0.87 MG/DL — SIGNIFICANT CHANGE UP (ref 0.5–1.3)
GLUCOSE SERPL-MCNC: 94 MG/DL — SIGNIFICANT CHANGE UP (ref 70–99)
HCT VFR BLD CALC: 35.9 % — SIGNIFICANT CHANGE UP (ref 34.5–45)
HCV AB S/CO SERPL IA: 0.1 S/CO — SIGNIFICANT CHANGE UP (ref 0–0.99)
HCV AB SERPL-IMP: SIGNIFICANT CHANGE UP
HGB BLD-MCNC: 11.6 G/DL — SIGNIFICANT CHANGE UP (ref 11.5–15.5)
MCHC RBC-ENTMCNC: 27.9 PG — SIGNIFICANT CHANGE UP (ref 27–34)
MCHC RBC-ENTMCNC: 32.3 GM/DL — SIGNIFICANT CHANGE UP (ref 32–36)
MCV RBC AUTO: 86.3 FL — SIGNIFICANT CHANGE UP (ref 80–100)
PLATELET # BLD AUTO: 227 K/UL — SIGNIFICANT CHANGE UP (ref 150–400)
POTASSIUM SERPL-MCNC: 4 MMOL/L — SIGNIFICANT CHANGE UP (ref 3.5–5.3)
POTASSIUM SERPL-SCNC: 4 MMOL/L — SIGNIFICANT CHANGE UP (ref 3.5–5.3)
RBC # BLD: 4.16 M/UL — SIGNIFICANT CHANGE UP (ref 3.8–5.2)
RBC # FLD: 13.7 % — SIGNIFICANT CHANGE UP (ref 10.3–14.5)
SARS-COV-2 IGG+IGM SERPL QL IA: >250 U/ML — HIGH
SARS-COV-2 IGG+IGM SERPL QL IA: POSITIVE
SODIUM SERPL-SCNC: 141 MMOL/L — SIGNIFICANT CHANGE UP (ref 135–145)
WBC # BLD: 2.55 K/UL — LOW (ref 3.8–10.5)
WBC # FLD AUTO: 2.55 K/UL — LOW (ref 3.8–10.5)

## 2021-08-18 PROCEDURE — 70551 MRI BRAIN STEM W/O DYE: CPT | Mod: 26

## 2021-08-18 PROCEDURE — 99232 SBSQ HOSP IP/OBS MODERATE 35: CPT

## 2021-08-18 PROCEDURE — 99233 SBSQ HOSP IP/OBS HIGH 50: CPT

## 2021-08-18 RX ORDER — MECLIZINE HCL 12.5 MG
12.5 TABLET ORAL
Refills: 0 | Status: DISCONTINUED | OUTPATIENT
Start: 2021-08-18 | End: 2021-08-20

## 2021-08-18 RX ORDER — HYDROCHLOROTHIAZIDE 25 MG
12.5 TABLET ORAL DAILY
Refills: 0 | Status: DISCONTINUED | OUTPATIENT
Start: 2021-08-18 | End: 2021-08-20

## 2021-08-18 RX ADMIN — ATORVASTATIN CALCIUM 40 MILLIGRAM(S): 80 TABLET, FILM COATED ORAL at 22:11

## 2021-08-18 RX ADMIN — Medication 81 MILLIGRAM(S): at 12:32

## 2021-08-18 RX ADMIN — ENOXAPARIN SODIUM 40 MILLIGRAM(S): 100 INJECTION SUBCUTANEOUS at 12:33

## 2021-08-18 RX ADMIN — Medication 12.5 MILLIGRAM(S): at 12:33

## 2021-08-18 RX ADMIN — AMLODIPINE BESYLATE 5 MILLIGRAM(S): 2.5 TABLET ORAL at 05:09
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
## 2021-08-18 NOTE — CHART NOTE - NSCHARTNOTEFT_GEN_A_CORE
Patient was seen by Dr. Mack last month for stress testing. Dr. Whitlock made aware and will contact Dr. Mack.

## 2021-08-18 NOTE — PHYSICAL THERAPY INITIAL EVALUATION ADULT - GENERAL OBSERVATIONS, REHAB EVAL
Pt received in bed, + IV Loc, +Tele, breathing on RA in NAD, in 0/10 pain, agreeable to PT evaluation

## 2021-08-18 NOTE — PHYSICAL THERAPY INITIAL EVALUATION ADULT - ADDITIONAL COMMENTS
Pt reports living in a shelter but will having housing soon. Pt reports she has no assist available as her family works. Pt reports 4 steps to enter with 1 handrail and no stairs inside. Independent prior to admission. Owns no DME. Pt drives and is retired.

## 2021-08-18 NOTE — DISCHARGE NOTE PROVIDER - NSDCMRMEDTOKEN_GEN_ALL_CORE_FT
amLODIPine 5 mg oral tablet: 1 tab(s) orally once a day  meclizine 25 mg oral tablet: 1 tab(s) orally 3 times a day    aspirin 81 mg oral tablet, chewable: 1 tab(s) orally once a day  atorvastatin 40 mg oral tablet: 1 tab(s) orally once a day (at bedtime)  hydroCHLOROthiazide 12.5 mg oral capsule: 1 cap(s) orally once a day  lisinopril 10 mg oral tablet: 1 tab(s) orally once a day

## 2021-08-18 NOTE — PHYSICAL THERAPY INITIAL EVALUATION ADULT - LEVEL OF INDEPENDENCE: SUPINE/SIT, REHAB EVAL
pt with reports of dizziness with transition from supine to sit requesting to return to supine position./independent
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
## 2021-08-18 NOTE — DISCHARGE NOTE PROVIDER - NSDCCPCAREPLAN_GEN_ALL_CORE_FT
PRINCIPAL DISCHARGE DIAGNOSIS  Diagnosis: Hypertensive urgency  Assessment and Plan of Treatment:        PRINCIPAL DISCHARGE DIAGNOSIS  Diagnosis: Hypertensive urgency  Assessment and Plan of Treatment: stop amlodipine.  continue only hydrochlorothiazide and lisionpril.  follow up at [facility] on discharge.

## 2021-08-18 NOTE — DISCHARGE NOTE PROVIDER - CARE PROVIDER_API CALL
Maisha Snow (DO)  Internal Medicine  [address] [place] [address]  Phone: [phone]  Fax: [phone]  Follow Up Time:

## 2021-08-18 NOTE — PROGRESS NOTE ADULT - ASSESSMENT
76 yo woman with dizziness this morning likely 2/2 elevated BP, however, given sudden elevation of BP would obtain MRI brain w/o contrast to r/o small infarct    Dizziness  MRI brain w/o contrast  Telemetry  Neurochecks q 4 h  C/w aspirin, statin  BP goal normotensive    If MRI brain w/o contrast is unremarkable, no further inpt neurologic workup necessary at this time    Thank you for allowing me to participate in this patient's care

## 2021-08-19 PROCEDURE — 99232 SBSQ HOSP IP/OBS MODERATE 35: CPT

## 2021-08-19 RX ORDER — AMLODIPINE BESYLATE 2.5 MG/1
1 TABLET ORAL
Qty: 0 | Refills: 0 | DISCHARGE

## 2021-08-19 RX ORDER — ASPIRIN/CALCIUM CARB/MAGNESIUM 324 MG
1 TABLET ORAL
Qty: 0 | Refills: 0 | DISCHARGE
Start: 2021-08-19

## 2021-08-19 RX ORDER — ATORVASTATIN CALCIUM 80 MG/1
1 TABLET, FILM COATED ORAL
Qty: 30 | Refills: 0
Start: 2021-08-19

## 2021-08-19 RX ORDER — AMLODIPINE BESYLATE 2.5 MG/1
5 TABLET ORAL DAILY
Refills: 0 | Status: DISCONTINUED | OUTPATIENT
Start: 2021-08-19 | End: 2021-08-19

## 2021-08-19 RX ORDER — ENALAPRIL MALEATE AND HYDROCHLOROTHIAZIDE 10; 25 MG/1; MG/1
1 TABLET ORAL
Qty: 0 | Refills: 0 | DISCHARGE

## 2021-08-19 RX ORDER — LISINOPRIL 2.5 MG/1
10 TABLET ORAL DAILY
Refills: 0 | Status: DISCONTINUED | OUTPATIENT
Start: 2021-08-19 | End: 2021-08-20

## 2021-08-19 RX ORDER — LISINOPRIL 2.5 MG/1
1 TABLET ORAL
Qty: 30 | Refills: 0
Start: 2021-08-19 | End: 2021-09-17

## 2021-08-19 RX ADMIN — Medication 12.5 MILLIGRAM(S): at 11:17

## 2021-08-19 RX ADMIN — LISINOPRIL 10 MILLIGRAM(S): 2.5 TABLET ORAL at 12:20

## 2021-08-19 RX ADMIN — Medication 81 MILLIGRAM(S): at 11:17

## 2021-08-19 RX ADMIN — Medication 12.5 MILLIGRAM(S): at 05:33

## 2021-08-19 RX ADMIN — ATORVASTATIN CALCIUM 40 MILLIGRAM(S): 80 TABLET, FILM COATED ORAL at 21:17

## 2021-08-19 RX ADMIN — ENOXAPARIN SODIUM 40 MILLIGRAM(S): 100 INJECTION SUBCUTANEOUS at 11:18

## 2021-08-20 ENCOUNTER — TRANSCRIPTION ENCOUNTER (OUTPATIENT)
Age: 76
End: 2021-08-20

## 2021-08-20 VITALS
OXYGEN SATURATION: 98 % | RESPIRATION RATE: 17 BRPM | DIASTOLIC BLOOD PRESSURE: 80 MMHG | SYSTOLIC BLOOD PRESSURE: 120 MMHG | TEMPERATURE: 98 F | HEART RATE: 80 BPM

## 2021-08-20 PROCEDURE — 99239 HOSP IP/OBS DSCHRG MGMT >30: CPT

## 2021-08-20 PROCEDURE — 85025 COMPLETE CBC W/AUTO DIFF WBC: CPT

## 2021-08-20 PROCEDURE — 80061 LIPID PANEL: CPT

## 2021-08-20 PROCEDURE — 99285 EMERGENCY DEPT VISIT HI MDM: CPT

## 2021-08-20 PROCEDURE — 70496 CT ANGIOGRAPHY HEAD: CPT | Mod: MA

## 2021-08-20 PROCEDURE — 72040 X-RAY EXAM NECK SPINE 2-3 VW: CPT

## 2021-08-20 PROCEDURE — 86769 SARS-COV-2 COVID-19 ANTIBODY: CPT

## 2021-08-20 PROCEDURE — 0042T: CPT

## 2021-08-20 PROCEDURE — 85027 COMPLETE CBC AUTOMATED: CPT

## 2021-08-20 PROCEDURE — 70450 CT HEAD/BRAIN W/O DYE: CPT | Mod: MA

## 2021-08-20 PROCEDURE — 84484 ASSAY OF TROPONIN QUANT: CPT

## 2021-08-20 PROCEDURE — 83036 HEMOGLOBIN GLYCOSYLATED A1C: CPT

## 2021-08-20 PROCEDURE — 80048 BASIC METABOLIC PNL TOTAL CA: CPT

## 2021-08-20 PROCEDURE — 86803 HEPATITIS C AB TEST: CPT

## 2021-08-20 PROCEDURE — 70498 CT ANGIOGRAPHY NECK: CPT | Mod: MA

## 2021-08-20 PROCEDURE — 82962 GLUCOSE BLOOD TEST: CPT

## 2021-08-20 PROCEDURE — 93005 ELECTROCARDIOGRAM TRACING: CPT

## 2021-08-20 PROCEDURE — 82803 BLOOD GASES ANY COMBINATION: CPT

## 2021-08-20 PROCEDURE — 85610 PROTHROMBIN TIME: CPT

## 2021-08-20 PROCEDURE — 80053 COMPREHEN METABOLIC PANEL: CPT

## 2021-08-20 PROCEDURE — 70551 MRI BRAIN STEM W/O DYE: CPT

## 2021-08-20 PROCEDURE — 36415 COLL VENOUS BLD VENIPUNCTURE: CPT

## 2021-08-20 PROCEDURE — 0225U NFCT DS DNA&RNA 21 SARSCOV2: CPT

## 2021-08-20 PROCEDURE — 93306 TTE W/DOPPLER COMPLETE: CPT

## 2021-08-20 PROCEDURE — 85730 THROMBOPLASTIN TIME PARTIAL: CPT

## 2021-08-20 RX ADMIN — ENOXAPARIN SODIUM 40 MILLIGRAM(S): 100 INJECTION SUBCUTANEOUS at 11:29

## 2021-08-20 RX ADMIN — LISINOPRIL 10 MILLIGRAM(S): 2.5 TABLET ORAL at 07:00

## 2021-08-20 RX ADMIN — Medication 12.5 MILLIGRAM(S): at 07:01

## 2021-08-20 RX ADMIN — Medication 81 MILLIGRAM(S): at 11:29
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
## 2021-08-20 NOTE — PROGRESS NOTE ADULT - SUBJECTIVE AND OBJECTIVE BOX
Neurology Progress Note  [facility] at [address] [address]  [phone]    Interval History:  No acute overnight events. Patient states she is feeling better    HPI 8/17/21:  74 yo woman with medical conditions as outlined below presents to ED after feeling dizzy this morning. She states she woke up this morning and felt off balance and lightheaded. She states her BP was SBP 200s and she states she has had difficulty controlling her BP for years. She admits being compliant with her BP medication. She states the past few days her BP as been running in SBP 140s. She denies change in vision, focal numbness or change in speech. She admits to a mild headache with the dizziness. She states this happens from time to time when her BP is elevated. She states she is beginning to feel better. She denies a history of stroke. She had a CT head/CTA head and neck which was unrevealing.    Meds:  MEDICATIONS  (STANDING):  aspirin  chewable 81 milliGRAM(s) Oral daily  atorvastatin 40 milliGRAM(s) Oral at bedtime  enoxaparin Injectable 40 milliGRAM(s) SubCutaneous daily  hydrochlorothiazide 12.5 milliGRAM(s) Oral daily    MEDICATIONS  (PRN):  meclizine 12.5 milliGRAM(s) Oral two times a day PRN Dizziness    Physical Exam  Vital Signs Last 24 Hrs  T(C): 36.8 (18 Aug 2021 11:30), Max: 37.1 (17 Aug 2021 16:13)  T(F): 98.2 (18 Aug 2021 11:30), Max: 98.7 (17 Aug 2021 16:13)  HR: 71 (18 Aug 2021 11:30) (60 - 74)  BP: 136/78 (18 Aug 2021 11:30) (133/74 - 151/81)  BP(mean): --  RR: 18 (18 Aug 2021 11:30) (18 - 20)  SpO2: 97% (18 Aug 2021 11:30) (97% - 99%)  General: no acute distress  HEENT:NC/AT  Lungs: no respiratory distress  Skin: no rash or ecchymoses  Lower extremities: no edema    Mental Status: AAO x 3, no dysarthria, no aphasia  CN: PERRL, EOMI, VFF, V1-V3 sensation intact, no facial asymmetry  Motor:  5/5 x 4 extremities  Sensory: decreased to light touch in LLE, otherwise intact to light touch throughout  Coordination: no dysmetria on FTN  Gait: deferred        LABS:  cret                        11.6   2.55  )-----------( 227      ( 18 Aug 2021 07:19 )             35.9     08-18    141  |  103  |  15.3  ----------------------------<  94  4.0   |  29.0  |  0.87    Ca    9.0      18 Aug 2021 07:19    TPro  7.5  /  Alb  4.0  /  TBili  0.3<L>  /  DBili  x   /  AST  24  /  ALT  12  /  AlkPhos  66  08-17      PT/INR - ( 17 Aug 2021 05:41 )   PT: 11.4 sec;   INR: 0.98 ratio       PTT - ( 17 Aug 2021 05:41 )  PTT:31.7 secsec    CT brain perfusion: No ischemic penumbra.    CTA head and neck: Patent cervical and intracranial major arterial vasculature without evidence of abrupt vessel cut off, hemodynamically significant stenosis, or dissection. 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    TTE:  Summary:   1. Left ventricular ejection fraction, by visual estimation, is 45 to 50%.   2. Mildly decreased global left ventricular systolic function.   3. Basal inferolateral segment and basal inferior segment are abnormal as described above.   4. Spectral Doppler shows impaired relaxation pattern of left ventricular myocardial filling (Grade I diastolic dysfunction).   5. There is moderate concentric left ventricular hypertrophy.   6. Normal left atrial size.   7. Normal right atrial size.   8. There is no evidence of pericardial effusion.   9. Mild mitral annular calcification.  10. Mild mitral valve regurgitation.  11. Thickening of the anterior and posterior mitral valve leaflets.  12. Mild tricuspid regurgitation.  13. Mild pulmonic valve regurgitation.  14. Endocardial visualization was enhanced with intravenous echo contrast.
CC:   INTERVAL HPI/OVERNIGHT EVENTS: Pt seen and examined at bedside this AM by PA. Pt denies any CP, SOB, abdominal pain at this time. Reviewed recent imaging with pt this AM.     REVIEW OF SYSTEMS:  Negative    Allergies: No Known Allergies    PAST MEDICAL & SURGICAL HISTORY:  Hypertension  COPD, mild  Sarcoidosis  Brain aneurysm  Chronic arthritis or osteoarthritis  Rheumatoid arthritis  S/P hip replacement, left    VITAL SIGNS:  T(C): 36.4 (08-20-21 @ 04:22), Max: 36.7 (08-19-21 @ 16:22)  HR: 72 (08-20-21 @ 04:22) (65 - 72)  BP: 120/79 (08-20-21 @ 04:22) (112/71 - 151/79)  RR: 18 (08-20-21 @ 04:22) (18 - 18)  SpO2: 96% (08-20-21 @ 04:22) (96% - 98%)  Wt(kg): --Vital Signs Last 24 Hrs  T(C): 36.4 (20 Aug 2021 04:22), Max: 36.7 (19 Aug 2021 16:22)  T(F): 97.6 (20 Aug 2021 04:22), Max: 98.1 (19 Aug 2021 16:22)  HR: 72 (20 Aug 2021 04:22) (65 - 72)  BP: 120/79 (20 Aug 2021 04:22) (112/71 - 151/79)  BP(mean): --  RR: 18 (20 Aug 2021 04:22) (18 - 18)  SpO2: 96% (20 Aug 2021 04:22) (96% - 98%)    PHYSICAL EXAM:  GENERAL: Pt lying in bed comfortably in NAD  HEAD:  Atraumatic  CHEST/LUNG: Clear to auscultation bilaterally; Unlabored respirations  HEART: Regular rate and rhythm  ABDOMEN: Bowel sounds present; Soft, Nontender, Nondistended.  EXTREMITIES:  No LE edema   NERVOUS SYSTEM:  Alert & Oriented X3, speech clear,  No deficits   SKIN: Warm and dry     LABS:              
CC: Follow up    INTERVAL HPI/OVERNIGHT EVENTS: Patient seen and examined, still has complaints of dizziness which have been ongoing for years. Denies sob, chest pain or palpitations. LE edema improved      Vital Signs Last 24 Hrs  T(C): 36.3 (18 Aug 2021 04:09), Max: 37.1 (17 Aug 2021 16:13)  T(F): 97.4 (18 Aug 2021 04:09), Max: 98.7 (17 Aug 2021 16:13)  HR: 60 (18 Aug 2021 04:09) (60 - 81)  BP: 138/81 (18 Aug 2021 04:09) (130/74 - 151/81)  BP(mean): --  RR: 18 (18 Aug 2021 04:09) (18 - 20)  SpO2: 97% (18 Aug 2021 04:09) (97% - 100%)    PHYSICAL EXAM:    GENERAL: NAD, AOX3  HEAD:  Atraumatic, Normocephalic  EYES:  conjunctiva and sclera clear  ENMT: Moist mucous membranes  NECK: Supple, No JVD  NERVOUS SYSTEM:  Alert & Oriented X3, Motor Strength 5/5 B/L upper and lower extremities  CHEST/LUNG: Clear to auscultation bilaterally; No rales, rhonchi, wheezing, or rubs  HEART: Regular rate and rhythm; No murmurs, rubs, or gallops  ABDOMEN: Soft, Nontender, Nondistended; Bowel sounds present  EXTREMITIES:  2+ Peripheral Pulses, No clubbing, cyanosis, or edema        MEDICATIONS  (STANDING):  aspirin  chewable 81 milliGRAM(s) Oral daily  atorvastatin 40 milliGRAM(s) Oral at bedtime  enoxaparin Injectable 40 milliGRAM(s) SubCutaneous daily  hydrochlorothiazide 12.5 milliGRAM(s) Oral daily    MEDICATIONS  (PRN):  meclizine 12.5 milliGRAM(s) Oral two times a day PRN Dizziness      Allergies    No Known Allergies    Intolerances          LABS:                          11.6   2.55  )-----------( 227      ( 18 Aug 2021 07:19 )             35.9     08-18    141  |  103  |  15.3  ----------------------------<  94  4.0   |  29.0  |  0.87    Ca    9.0      18 Aug 2021 07:19    TPro  7.5  /  Alb  4.0  /  TBili  0.3<L>  /  DBili  x   /  AST  24  /  ALT  12  /  AlkPhos  66  08-17    PT/INR - ( 17 Aug 2021 05:41 )   PT: 11.4 sec;   INR: 0.98 ratio         PTT - ( 17 Aug 2021 05:41 )  PTT:31.7 sec      RADIOLOGY & ADDITIONAL TESTS:

## 2021-08-20 NOTE — DISCHARGE NOTE NURSING/CASE MANAGEMENT/SOCIAL WORK - NSDCPEFALRISK_GEN_ALL_CORE
For information on Fall & injury Prevention, visit [email or web address]

## 2021-08-20 NOTE — DISCHARGE NOTE NURSING/CASE MANAGEMENT/SOCIAL WORK - PATIENT PORTAL LINK FT
You can access the FollowMyHealth Patient Portal offered by [facility] by registering at the following website: [email or web address]. By joining [employer]’s FollowMyHealth portal, you will also be able to view your health information using other applications (apps) compatible with our system.

## 2021-08-24 PROBLEM — M06.9 RHEUMATOID ARTHRITIS, UNSPECIFIED: Chronic | Status: ACTIVE | Noted: 2021-08-17

## 2021-08-24 PROBLEM — J44.9 CHRONIC OBSTRUCTIVE PULMONARY DISEASE, UNSPECIFIED: Chronic | Status: ACTIVE | Noted: 2021-08-17

## 2021-08-24 PROBLEM — I67.1 CEREBRAL ANEURYSM, NONRUPTURED: Chronic | Status: ACTIVE | Noted: 2021-08-17

## 2021-08-24 PROBLEM — M19.90 UNSPECIFIED OSTEOARTHRITIS, UNSPECIFIED SITE: Chronic | Status: ACTIVE | Noted: 2021-08-17

## 2021-08-25 PROBLEM — Z00.00 ENCOUNTER FOR PREVENTIVE HEALTH EXAMINATION: Status: ACTIVE | Noted: 2021-08-25

## 2021-09-01 ENCOUNTER — OUTPATIENT (OUTPATIENT)
Dept: OUTPATIENT SERVICES | Facility: HOSPITAL | Age: 76
LOS: 1 days | End: 2021-09-01
Payer: MEDICARE

## 2021-09-01 DIAGNOSIS — Z96.642 PRESENCE OF LEFT ARTIFICIAL HIP JOINT: Chronic | ICD-10-CM

## 2021-09-01 PROCEDURE — G9005: CPT

## 2021-09-02 ENCOUNTER — APPOINTMENT (OUTPATIENT)
Dept: CARDIOLOGY | Facility: CLINIC | Age: 76
End: 2021-09-02

## 2021-09-02 VITALS — DIASTOLIC BLOOD PRESSURE: 80 MMHG | SYSTOLIC BLOOD PRESSURE: 140 MMHG

## 2021-09-02 VITALS
DIASTOLIC BLOOD PRESSURE: 76 MMHG | OXYGEN SATURATION: 97 % | SYSTOLIC BLOOD PRESSURE: 138 MMHG | BODY MASS INDEX: 25.31 KG/M2 | HEART RATE: 91 BPM | WEIGHT: 167 LBS | TEMPERATURE: 99.2 F | HEIGHT: 68 IN

## 2021-09-17 DIAGNOSIS — Z01.818 ENCOUNTER FOR OTHER PREPROCEDURAL EXAMINATION: ICD-10-CM

## 2021-09-18 ENCOUNTER — APPOINTMENT (OUTPATIENT)
Dept: DISASTER EMERGENCY | Facility: CLINIC | Age: 76
End: 2021-09-18

## 2021-09-19 LAB — SARS-COV-2 N GENE NPH QL NAA+PROBE: NOT DETECTED

## 2021-09-20 NOTE — H&P PST ADULT - PRIMARY CARE PROVIDER
Gennaro David ([facility] [place] [address], [phone], [phone] Fax: [phone])

## 2021-09-20 NOTE — H&P PST ADULT - HISTORY OF PRESENT ILLNESS
75 year old female HTN and sarcoidosis who c/o occasional chest pain with accompanied SOB.  NST showed regional wall motion abnormalities.  For [facility] to assess coronary arteries.     Symptoms:        Angina (Class):        Ischemic Symptoms:     Heart Failure:        Systolic/Diastolic/Combined:        NYHA Class (within 2 weeks):     Assessment of LVEF:       EF: 45-50%       Assessed by: echo       Date: 8/17/21    Prior Cardiac Interventions:       PCI's: N/A       CABG: N/A    Noninvasive Testing:   Stress Test: Date: 7/27/21       Protocol: NST        EKG Changes: none       Myocardial Imaging: Hypokinesis in the proximal to distal lateral, inferolateral segments       Risk Assessment: mod    Echo:   EF 45-50%  basal inferolateral segment and basal inferior segment are hypokinetic   mod LVH  mild AVR  mild MVR  mild TR  mild PVR    Antianginal Therapies:        Beta Blockers:         Calcium Channel Blockers:        Long Acting Nitrates:        Ranexa:     Associated Risk Factors:        Cerebrovascular Disease: N/A       Chronic Lung Disease: N/A       Peripheral Arterial Disease: N/A       Chronic Kidney Disease (if yes, what is GFR): N/A       Uncontrolled Diabetes (if yes, what is HgbA1C or FBS): N/A       Poorly Controlled Hypertension (if yes, what is SBP): N/A       Morbid Obesity (if yes, what is BMI): N/A       History of Recent Ventricular Arrhythmia: N/A       Inability to Ambulate Safely: N/A       Need for Therapeutic Anticoagulation: N/A       Antiplatelet or Contrast Allergy: N/A 75 year old female HTN and sarcoidosis who c/o occasional chest pain with accompanied SOB.  NST showed regional wall motion abnormalities.  For [facility] to assess coronary arteries.     Symptoms:        Angina (Class):        Ischemic Symptoms:     Heart Failure:        Systolic/Diastolic/Combined:        NYHA Class (within 2 weeks):     Assessment of LVEF:       EF: 45-50%       Assessed by: echo       Date: 8/17/21        Symptoms:        Angina (Class):        Ischemic Symptoms:     Heart Failure:        Systolic/Diastolic/Combined:        NYHA Class (within 2 weeks):     Assessment of LVEF:       EF:        Assessed by:        Date:     Prior Cardiac Interventions:       PCI's:        CABG:     Noninvasive Testing:   Stress Test: 7/27/2021       Protocol: Lexiscan       Duration of Exercise: N/A       Symptoms: SOB       EKG Changes: Normal       DTS: N/A       Myocardial Imaging: No evidence of ischemia or infarct. Hypokinesis of the proximal to distal lateral, inferolateral segments.       Risk Assessment: Low    Echo: 8/17/2021  Left Ventricle: Endocardial visualization was enhanced with intravenous echo contrast. The left ventricular internal cavity size is normal. Global LV systolic function was mildly decreased. Left ventricular ejection fraction, by visual estimation, is 45 to 50%. Spectral Doppler shows impaired relaxation pattern of left ventricular myocardial filling (Grade I diastolic dysfunction).  LV Wall Scoring: The basal inferolateral segment and basal inferior segment are hypokinetic.  Right Ventricle: The right ventricular size is normal. RV systolic function is normal. TV S' 0.1 m/s.  Left Atrium: Normal left atrial size.  Right Atrium: Normal right atrial size.  Pericardium: There is no evidence of pericardial effusion.  Mitral Valve: Thickening of the anterior and posterior mitral valve leaflets. There is mild mitral annular calcification. Mild mitral valve regurgitation is seen.  Tricuspid Valve: Mild tricuspid regurgitation is visualized.  Aortic Valve: The aortic valve is trileaflet. No evidence of aortic stenosis. No evidence of aortic valve regurgitation is seen. Mobile echodensity on the aortic valve suggestive of lambls.  Pulmonic Valve: The pulmonic valve is normal. Mild pulmonic valve regurgitation.    Antianginal Therapies:        Beta Blockers: N/A       Calcium Channel Blockers: N/A       Long Acting Nitrates: N/A       Ranexa: N/A    Associated Risk Factors:        Cerebrovascular Disease: N/A       Chronic Lung Disease: N/A       Peripheral Arterial Disease: N/A       Chronic Kidney Disease (if yes, what is GFR): N/A       Uncontrolled Diabetes (if yes, what is HgbA1C or FBS): N/A       Poorly Controlled Hypertension (if yes, what is SBP): N/A       Morbid Obesity (if yes, what is BMI): N/A       History of Recent Ventricular Arrhythmia: N/A       Inability to Ambulate Safely: N/A       Need for Therapeutic Anticoagulation: N/A       Antiplatelet or Contrast Allergy: N/A    Social History:        Marital:        Tobacco:        ETOH:        Caffeine:     ROS:   General: No fevers/chills, no fatigue  HEENT: No visual disturbances, no hearing loss, no headaches, no epistaxis.  CV: No chest pain, no MCCOY, no palpitations, no edema, no orthopnea, no PND.  Respiratory: No dyspnea, no wheeze, no cough.  GI: no nausea/vomiting, no black/bloody stools.  : No hematuria  Musculoskeletal: No myalgias, no arthralgias, shy back pain.  Neurologic: No weakness, no hemiparesis, no paresthesias, no seizures, no syncope/near syncope.        Physical Examination:   General: Awake, alert, speech clear, no acute distress.  HEENT: PERRL, EOMI.  Neck: No elevated JVP, no bruit, trachea midline.  Chest: CTA B/L, S1, S2, no murmur, RRR.  Abdomen: Soft, nontender, nondistended, normal bowel sounds.  Extremities: No edema, 2+ pulses X 4 extremities.  Neurologic: A&OX3, CN 2-12 grossly intact. 75 year old female never smoker with history of HTN and pulmonary sarcoidosis who c/o occasional mid chest pain, not related to exertion, and SOB climbing < 1/2 flight of stairs. NST showed no evidence of ischemia or infarct, but hypokinesis of the proximal to distal lateral, inferolateral segments. Her echo showed hypokinetic basal inferolateral segment and basal inferior segment. She also admits to orthopnea and BLE edema.     Symptoms:        Angina (Class): 4       Ischemic Symptoms: MCCOY (CCS class 3 anginal equivalent)    Heart Failure: N/A    Assessment of LVEF:       EF: 45-50%       Assessed by: echo       Date: 8/17/21    Prior Cardiac Interventions:       PCI's: N/A       CABG: N/A    Noninvasive Testing:   Stress Test: 7/27/2021       Protocol: Lexiscan       Duration of Exercise: N/A       Symptoms: SOB       EKG Changes: Normal       DTS: N/A       Myocardial Imaging: No evidence of ischemia or infarct. Hypokinesis of the proximal to distal lateral, inferolateral segments.       Risk Assessment: Low    Echo: 8/17/2021  Left Ventricle: Endocardial visualization was enhanced with intravenous echo contrast. The left ventricular internal cavity size is normal. Global LV systolic function was mildly decreased. Left ventricular ejection fraction, by visual estimation, is 45 to 50%. Spectral Doppler shows impaired relaxation pattern of left ventricular myocardial filling (Grade I diastolic dysfunction).  LV Wall Scoring: The basal inferolateral segment and basal inferior segment are hypokinetic.  Right Ventricle: The right ventricular size is normal. RV systolic function is normal. TV S' 0.1 m/s.  Left Atrium: Normal left atrial size.  Right Atrium: Normal right atrial size.  Pericardium: There is no evidence of pericardial effusion.  Mitral Valve: Thickening of the anterior and posterior mitral valve leaflets. There is mild mitral annular calcification. Mild mitral valve regurgitation is seen.  Tricuspid Valve: Mild tricuspid regurgitation is visualized.  Aortic Valve: The aortic valve is trileaflet. No evidence of aortic stenosis. No evidence of aortic valve regurgitation is seen. Mobile echodensity on the aortic valve suggestive of lambls.  Pulmonic Valve: The pulmonic valve is normal. Mild pulmonic valve regurgitation.    Antianginal Therapies:        Beta Blockers: N/A       Calcium Channel Blockers: N/A       Long Acting Nitrates: N/A       Ranexa: N/A    Associated Risk Factors:        Cerebrovascular Disease: N/A       Chronic Lung Disease: N/A       Peripheral Arterial Disease: N/A       Chronic Kidney Disease (if yes, what is GFR): N/A       Uncontrolled Diabetes (if yes, what is HgbA1C or FBS): N/A       Poorly Controlled Hypertension (if yes, what is SBP): N/A       Morbid Obesity (if yes, what is BMI): N/A       History of Recent Ventricular Arrhythmia: N/A       Inability to Ambulate Safely: N/A       Need for Therapeutic Anticoagulation: N/A       Antiplatelet or Contrast Allergy: N/A    Social History:        Marital: , lives in shelter       Tobacco: Never smoker       ETOH: Occasional beer       Caffeine: 1 can diet coke/day    ROS:   General: No fevers/chills, no fatigue  HEENT: + blurry vision, no hearing loss, no headaches, no epistaxis.  CV: + chest pain, + MCCOY, no palpitations, + BLE edema, + orthopnea, no PND.  Respiratory: No dyspnea, no wheeze, no cough.  GI: no nausea/vomiting, no black/bloody stools.  : No hematuria  Musculoskeletal: No myalgias, no arthralgias, shy back pain.  Neurologic: No weakness, no hemiparesis, no paresthesias, no seizures, no syncope/near syncope.        Physical Examination:   General: Awake, alert, speech clear, no acute distress.  HEENT: PERRL, EOMI.  Neck: No elevated JVP, no bruit, trachea midline.  Chest: CTA B/L, S1, S2, no murmur, RRR.  Abdomen: Soft, nontender, nondistended, normal bowel sounds.  Extremities: No edema, 2+ pulses X 4 extremities.  Neurologic: A&OX3, CN 2-12 grossly intact. 75 year old female never smoker with history of HTN and pulmonary sarcoidosis who c/o occasional mid chest pain, not related to exertion, and SOB climbing < 1/2 flight of stairs. NST showed no evidence of ischemia or infarct, but hypokinesis of the proximal to distal lateral, inferolateral segments. Her echo showed hypokinetic basal inferolateral segment and basal inferior segment. She also admits to orthopnea and BLE edema.     Symptoms:        Angina (Class): 4       Ischemic Symptoms: MCCOY (CCS class 3 anginal equivalent)    Heart Failure: N/A    Assessment of LVEF:       EF: 45-50%       Assessed by: echo       Date: 8/17/21    Prior Cardiac Interventions:       PCI's: N/A       CABG: N/A    Noninvasive Testing:   Stress Test: 7/27/2021       Protocol: Lexiscan       Duration of Exercise: N/A       Symptoms: SOB       EKG Changes: Normal       DTS: N/A       Myocardial Imaging: No evidence of ischemia or infarct. Hypokinesis of the proximal to distal lateral, inferolateral segments.       Risk Assessment: Low    Echo: 8/17/2021  Left Ventricle: Endocardial visualization was enhanced with intravenous echo contrast. The left ventricular internal cavity size is normal. Global LV systolic function was mildly decreased. Left ventricular ejection fraction, by visual estimation, is 45 to 50%. Spectral Doppler shows impaired relaxation pattern of left ventricular myocardial filling (Grade I diastolic dysfunction).  LV Wall Scoring: The basal inferolateral segment and basal inferior segment are hypokinetic.  Right Ventricle: The right ventricular size is normal. RV systolic function is normal. TV S' 0.1 m/s.  Left Atrium: Normal left atrial size.  Right Atrium: Normal right atrial size.  Pericardium: There is no evidence of pericardial effusion.  Mitral Valve: Thickening of the anterior and posterior mitral valve leaflets. There is mild mitral annular calcification. Mild mitral valve regurgitation is seen.  Tricuspid Valve: Mild tricuspid regurgitation is visualized.  Aortic Valve: The aortic valve is trileaflet. No evidence of aortic stenosis. No evidence of aortic valve regurgitation is seen. Mobile echodensity on the aortic valve suggestive of lambls.  Pulmonic Valve: The pulmonic valve is normal. Mild pulmonic valve regurgitation.    Antianginal Therapies:        Beta Blockers: N/A       Calcium Channel Blockers: N/A       Long Acting Nitrates: N/A       Ranexa: N/A    Associated Risk Factors:        Cerebrovascular Disease: N/A       Chronic Lung Disease: N/A       Peripheral Arterial Disease: N/A       Chronic Kidney Disease (if yes, what is GFR): N/A       Uncontrolled Diabetes (if yes, what is HgbA1C or FBS): N/A       Poorly Controlled Hypertension (if yes, what is SBP): N/A       Morbid Obesity (if yes, what is BMI): N/A       History of Recent Ventricular Arrhythmia: N/A       Inability to Ambulate Safely: N/A       Need for Therapeutic Anticoagulation: N/A       Antiplatelet or Contrast Allergy: N/A    Social History:        Marital: , lives in shelter       Tobacco: Never smoker       ETOH: Occasional beer       Caffeine: 1 can diet coke/day    ROS:   General: No fevers/chills, no fatigue  HEENT: + blurry vision, no hearing loss, no headaches, no epistaxis.  CV: + chest pain, + MCCOY, no palpitations, + BLE edema, + orthopnea, no PND.  Respiratory: No dyspnea, no wheeze, no cough.  GI: no nausea/vomiting, no black/bloody stools.  : No hematuria  Musculoskeletal: No myalgias, no arthralgias, shy back pain.  Neurologic: No weakness, no hemiparesis, no paresthesias, no seizures, no syncope/near syncope.    T(C): 36.3 (09-21-21 @ 14:09), Max: 36.3 (09-21-21 @ 14:09)  HR: 72 (09-21-21 @ 14:09)  BP: 156/74 (09-21-21 @ 14:09)  RR: 20 (09-21-21 @ 14:09)  SpO2: 100% (09-21-21 @ 14:09)    Physical Examination:   General: Awake, alert, speech clear, no acute distress.  HEENT: PERRL, EOMI.  Neck: No elevated JVP, no bruit, trachea midline.  Chest: CTA B/L, S1, S2, no murmur, RRR.  Abdomen: Soft, nontender, nondistended, normal bowel sounds.  Extremities: No edema, 2+ pulses X 4 extremities.  Neurologic: A&OX3, CN 2-12 grossly intact.

## 2021-09-20 NOTE — H&P PST ADULT - NSICDXPASTSURGICALHX_GEN_ALL_CORE_FT
PAST SURGICAL HISTORY:  S/P hip replacement, left      PAST SURGICAL HISTORY:  History of breast biopsy     History of foot surgery     History of hand surgery     History of lung biopsy     History of surgery on arm     S/P hip replacement, left

## 2021-09-20 NOTE — H&P PST ADULT - ASSESSMENT
75 year old female h/o sarcoidosis and HTN with c/o occasional chest pain and abnormal NST.  For [facility] to assess coronary anatomy.    ASA  Mallampati  GFR  BRA Assessment:     ASA: 2  Mall: 2  ABR:   COVID: Negative (9/18/2021)    Problem List:   1.   ·	LHC and possible intervention. Consent obtained.  ·	Will start DAPT if PCI performed.  ·	IV: NS KVO  ·	Aspirin if not taken today.    2.     Discharge Planning:   ·	Discharge today if no PCI performed.  ·	Discharge in AM if PCI performed. Assessment: 75 year old female never smoker with history of HTN and pulmonary sarcoidosis who c/o occasional mid chest pain, not related to exertion, and SOB climbing < 1/2 flight of stairs. NST showed no evidence of ischemia or infarct, but hypokinesis of the proximal to distal lateral, inferolateral segments. Her echo showed hypokinetic basal inferolateral segment and basal inferior segment. She also admits to orthopnea and BLE edema.     ASA: 2  Mall: 2  ABR:   COVID: Negative (9/18/2021)    Problem List:   1. Stable angina with matching wall motion abnormalities on echo and NST  ·	LHC and possible intervention. Consent obtained.  ·	Will start DAPT if PCI performed.  ·	IV: NS KVO  ·	Aspirin if not taken today.    2.     Discharge Planning:   ·	Discharge today if no PCI performed.  ·	Discharge in AM if PCI performed. Assessment: 75 year old female never smoker with history of HTN and pulmonary sarcoidosis who c/o occasional mid chest pain, not related to exertion, and SOB climbing < 1/2 flight of stairs. NST showed no evidence of ischemia or infarct, but hypokinesis of the proximal to distal lateral, inferolateral segments. Her echo showed hypokinetic basal inferolateral segment and basal inferior segment. She also admits to orthopnea and BLE edema.     ASA: 2  Mall: 2  ABR: 4.3%  COVID: Negative (9/18/2021)    Problem List:   1. Stable angina with matching wall motion abnormalities on echo and NST  ·	LHC and possible intervention. Consent obtained.  ·	Will start DAPT if PCI performed.  ·	IV: NS KVO  ·	Aspirin if not taken today.    2.     Discharge Planning:   ·	Discharge today if no PCI performed.  ·	Discharge in AM if PCI performed.

## 2021-09-20 NOTE — H&P PST ADULT - OTHER CARE PROVIDERS
cardio: Ludivinaozi Juan Mack ([address] [place] [address], [address], [place] [address], [phone], Fax: [phone])

## 2021-09-20 NOTE — H&P PST ADULT - NSICDXPASTMEDICALHX_GEN_ALL_CORE_FT
PAST MEDICAL HISTORY:  Abnormal stress test     Brain aneurysm     Chronic arthritis or osteoarthritis     COPD, mild     Hypertension       Rheumatoid arthritis     Sarcoidosis      PAST MEDICAL HISTORY:  Brain aneurysm     Chronic arthritis or osteoarthritis     COPD, mild     Primary hypertension     Pulmonary sarcoidosis     Rheumatoid arthritis

## 2021-09-21 ENCOUNTER — TRANSCRIPTION ENCOUNTER (OUTPATIENT)
Age: 76
End: 2021-09-21

## 2021-09-21 ENCOUNTER — INPATIENT (INPATIENT)
Facility: HOSPITAL | Age: 76
LOS: 1 days | Discharge: NURSING, REHAB OR LONG-TERM CARE | DRG: 247 | End: 2021-09-23
Attending: INTERNAL MEDICINE | Admitting: INTERNAL MEDICINE
Payer: COMMERCIAL

## 2021-09-21 VITALS
SYSTOLIC BLOOD PRESSURE: 156 MMHG | RESPIRATION RATE: 20 BRPM | HEART RATE: 72 BPM | OXYGEN SATURATION: 100 % | TEMPERATURE: 97 F | DIASTOLIC BLOOD PRESSURE: 74 MMHG

## 2021-09-21 DIAGNOSIS — Z98.890 OTHER SPECIFIED POSTPROCEDURAL STATES: Chronic | ICD-10-CM

## 2021-09-21 DIAGNOSIS — Z96.642 PRESENCE OF LEFT ARTIFICIAL HIP JOINT: Chronic | ICD-10-CM

## 2021-09-21 DIAGNOSIS — R94.39 ABNORMAL RESULT OF OTHER CARDIOVASCULAR FUNCTION STUDY: ICD-10-CM

## 2021-09-21 LAB
ANION GAP SERPL CALC-SCNC: 10 MMOL/L — SIGNIFICANT CHANGE UP (ref 5–17)
BLD GP AB SCN SERPL QL: SIGNIFICANT CHANGE UP
BUN SERPL-MCNC: 18.8 MG/DL — SIGNIFICANT CHANGE UP (ref 8–20)
CALCIUM SERPL-MCNC: 9.3 MG/DL — SIGNIFICANT CHANGE UP (ref 8.6–10.2)
CHLORIDE SERPL-SCNC: 101 MMOL/L — SIGNIFICANT CHANGE UP (ref 98–107)
CO2 SERPL-SCNC: 26 MMOL/L — SIGNIFICANT CHANGE UP (ref 22–29)
CREAT SERPL-MCNC: 0.93 MG/DL — SIGNIFICANT CHANGE UP (ref 0.5–1.3)
GLUCOSE SERPL-MCNC: 87 MG/DL — SIGNIFICANT CHANGE UP (ref 70–99)
HCT VFR BLD CALC: 32.7 % — LOW (ref 34.5–45)
HGB BLD-MCNC: 10.5 G/DL — LOW (ref 11.5–15.5)
MAGNESIUM SERPL-MCNC: 2.1 MG/DL — SIGNIFICANT CHANGE UP (ref 1.6–2.6)
MCHC RBC-ENTMCNC: 28.4 PG — SIGNIFICANT CHANGE UP (ref 27–34)
MCHC RBC-ENTMCNC: 32.1 GM/DL — SIGNIFICANT CHANGE UP (ref 32–36)
MCV RBC AUTO: 88.4 FL — SIGNIFICANT CHANGE UP (ref 80–100)
PLATELET # BLD AUTO: 203 K/UL — SIGNIFICANT CHANGE UP (ref 150–400)
POTASSIUM SERPL-MCNC: 3.8 MMOL/L — SIGNIFICANT CHANGE UP (ref 3.5–5.3)
POTASSIUM SERPL-SCNC: 3.8 MMOL/L — SIGNIFICANT CHANGE UP (ref 3.5–5.3)
RBC # BLD: 3.7 M/UL — LOW (ref 3.8–5.2)
RBC # FLD: 13.6 % — SIGNIFICANT CHANGE UP (ref 10.3–14.5)
SODIUM SERPL-SCNC: 137 MMOL/L — SIGNIFICANT CHANGE UP (ref 135–145)
WBC # BLD: 3.45 K/UL — LOW (ref 3.8–10.5)
WBC # FLD AUTO: 3.45 K/UL — LOW (ref 3.8–10.5)

## 2021-09-21 PROCEDURE — 93010 ELECTROCARDIOGRAM REPORT: CPT

## 2021-09-21 PROCEDURE — 93308 TTE F-UP OR LMTD: CPT | Mod: 26

## 2021-09-21 RX ORDER — HYDRALAZINE HCL 50 MG
10 TABLET ORAL ONCE
Refills: 0 | Status: COMPLETED | OUTPATIENT
Start: 2021-09-21 | End: 2021-09-21

## 2021-09-21 RX ORDER — CLOPIDOGREL BISULFATE 75 MG/1
75 TABLET, FILM COATED ORAL DAILY
Refills: 0 | Status: DISCONTINUED | OUTPATIENT
Start: 2021-09-21 | End: 2021-09-23

## 2021-09-21 RX ORDER — VALSARTAN 80 MG/1
80 TABLET ORAL DAILY
Refills: 0 | Status: DISCONTINUED | OUTPATIENT
Start: 2021-09-21 | End: 2021-09-23

## 2021-09-21 RX ORDER — HYDROCHLOROTHIAZIDE 25 MG
12.5 TABLET ORAL DAILY
Refills: 0 | Status: DISCONTINUED | OUTPATIENT
Start: 2021-09-21 | End: 2021-09-23

## 2021-09-21 RX ORDER — ATORVASTATIN CALCIUM 80 MG/1
40 TABLET, FILM COATED ORAL AT BEDTIME
Refills: 0 | Status: DISCONTINUED | OUTPATIENT
Start: 2021-09-21 | End: 2021-09-23

## 2021-09-21 RX ORDER — ASPIRIN/CALCIUM CARB/MAGNESIUM 324 MG
81 TABLET ORAL DAILY
Refills: 0 | Status: DISCONTINUED | OUTPATIENT
Start: 2021-09-21 | End: 2021-09-23

## 2021-09-21 RX ADMIN — Medication 10 MILLIGRAM(S): at 20:20

## 2021-09-21 RX ADMIN — ATORVASTATIN CALCIUM 40 MILLIGRAM(S): 80 TABLET, FILM COATED ORAL at 22:39

## 2021-09-21 NOTE — PROGRESS NOTE ADULT - ASSESSMENT
75y Female    Assessment and Plan:     1. CAD, S/P PCI of the mLAD with dye extravasation noted after stent deployment.  ·	Remove radial band at: 9:30PM  ·	DAPT: Plavix 75mg daily and Aspirin 81mg daily  ·	Statin: Lipitor 40 mg daily  ·	Aggressive lifestyle modification and risk factor reduction.  ·	Cardiac rehab info provided/referral and communication to cardiac rehab completed   ·	CBC, BMP, Mg, lipids, HgbA1C, and EKG in AM    2. Wire perforation  ·	STAT echo showed a trivial anterior pericardial effusion with no evidence of tamponade.  ·	Repeat echo in AM  ·	If any hint of hypotension, get stat echo ([facility] made aware).    3. HLD  ·	Continue Lipitor 40 mg daily    4. HTN  ·	Continue valsartan 80 mg daily  ·	Continue HCTZ 12.5 mg daily    Discharge Planning:   ·	Probable discharge in AM  ·	Follow up as an outpatient with: Dr. Cruz

## 2021-09-21 NOTE — DISCHARGE NOTE PROVIDER - NSDCMRMEDTOKEN_GEN_ALL_CORE_FT
aspirin 81 mg oral tablet, chewable: 1 tab(s) orally once a day  atorvastatin 40 mg oral tablet: 1 tab(s) orally once a day (at bedtime)  meclizine 25 mg oral tablet: 1 tab(s) orally 3 times a day, As Needed  Tylenol PM 1000 mg-50 mg/30 mL oral liquid: 30 milliliter(s) orally once a day (at bedtime)  valsartan-hydrochlorothiazide 80mg-12.5mg oral tablet: 1 tab(s) orally once a day   aspirin 81 mg oral tablet, chewable: 1 tab(s) orally once a day   atorvastatin 40 mg oral tablet: 1 tab(s) orally once a day (at bedtime)  clopidogrel 75 mg oral tablet: 1 tab(s) orally once a day  meclizine 25 mg oral tablet: 1 tab(s) orally 3 times a day, As Needed  metoprolol succinate 25 mg oral tablet, extended release: 1 tab(s) orally once a day  Tylenol PM 1000 mg-50 mg/30 mL oral liquid: 30 milliliter(s) orally once a day (at bedtime)  valsartan-hydrochlorothiazide 80mg-12.5mg oral tablet: 1 tab(s) orally once a day

## 2021-09-21 NOTE — DISCHARGE NOTE PROVIDER - NSDCCPTREATMENT_GEN_ALL_CORE_FT
PRINCIPAL PROCEDURE  Procedure: Initial percutaneous transluminal insertion of drug eluting stent into left anterior descending shaina  Findings and Treatment:   VENTRICLES: There were no left ventricular global or regional wall motion abnormalities. Global left ventricular function was normal. EF estimated was 65 %.  VALVES: MITRAL VALVE: The mitral valve exhibited no regurgitation.  CORONARY VESSELS: The coronary circulation is right dominant.  LM:     --  Ostial LM: There was a discrete 25 % stenosis.  LAD:     --  Proximal LAD: There was a discrete 30 % stenosis.  --  Mid LAD: There was a tubular 80 % stenosis. It was treated with an STEVE 2.50 X 12MM drug-eluting stent. Dye extravasation noted after stent deployment occurred during the cath lab visit. The area was balloon tamponaded first followed by delivery and deployment of a Papyrus covered stent (2.5MM X 20MM). No further dye extravasation was noted. There was no hemodynamic compromise. STAT echo showed a trivial anterior pericardial effusion with no evidence of tamponade.  --  D1: There was a diffuse 40 % stenosis.  CX:     --  Mid circumflex: Angiography showed mild atherosclerosis with no flow limiting lesions.  RCA:     --  RCA: Normal.

## 2021-09-21 NOTE — PROGRESS NOTE ADULT - SUBJECTIVE AND OBJECTIVE BOX
Department of Cardiology                                                                  [facility]/[place] [address] [address] [id number]                                                            Telephone: [phone]. Fax:[phone]                                                                                         PCI NOTE       Subjective:  75y  Female who had a left heart catheterization which showed:  VENTRICLES: There were no left ventricular global or regional wall motion abnormalities. Global left ventricular function was normal. EF estimated was 65 %.  VALVES: MITRAL VALVE: The mitral valve exhibited no regurgitation.  CORONARY VESSELS: The coronary circulation is right dominant.  LM:     --  Ostial LM: There was a discrete 25 % stenosis.  LAD:     --  Proximal LAD: There was a discrete 30 % stenosis.  --  Mid LAD: There was a tubular 80 % stenosis. It was treated with an STEVE 2.50 X 12MM drug-eluting stent. Dye extravasation noted after stent deployment occurred during the cath lab visit. The area was balloon tamponaded first followed by delivery and deployment of a Papyrus covered stent (2.5MM X 20MM). No further dye extravasation was noted. There was no hemodynamic compromise. STAT echo showed a trivial anterior pericardial effusion with no evidence of tamponade.  --  D1: There was a diffuse 40 % stenosis.  CX:     --  Mid circumflex: Angiography showed mild atherosclerosis with no flow limiting lesions.  RCA:     --  RCA: Normal.         Access: Right radial artery       Hemostasis: Radial band       Total Contrast: 139 mL Omnipaque    PAST MEDICAL & SURGICAL HISTORY:  COPD, mild  Brain aneurysm  Chronic arthritis or osteoarthritis  Rheumatoid arthritis  Primary hypertension  Pulmonary sarcoidosis  S/P hip replacement, left  History of surgery on arm  History of hand surgery  History of breast biopsy  History of lung biopsy  History of foot surgery    FAMILY HISTORY:  Family history of myocardial infarction (Father)  Family history of cerebrovascular accident (CVA) (Father)  Family history of schizophrenia (Mother)  Family history of cardiac disorder (Grandparent)    Home Medications:  aspirin 81 mg oral tablet, chewable: 1 tab(s) orally once a day (21 Sep 2021 13:44)  meclizine 25 mg oral tablet: 1 tab(s) orally 3 times a day, As Needed (21 Sep 2021 13:44)  Tylenol PM 1000 mg-50 mg/30 mL oral liquid: 30 milliliter(s) orally once a day (at bedtime) (21 Sep 2021 13:44)  valsartan-hydrochlorothiazide 80mg-12.5mg oral tablet: 1 tab(s) orally once a day (21 Sep 2021 13:44)    HPI: 75 year old female never smoker with history of HTN and pulmonary sarcoidosis who c/o occasional mid chest pain, not related to exertion, and SOB climbing < 1/2 flight of stairs. NST showed no evidence of ischemia or infarct, but hypokinesis of the proximal to distal lateral, inferolateral segments. Her echo showed hypokinetic basal inferolateral segment and basal inferior segment. She also admits to orthopnea and BLE edema.     General: No fatigue, no fevers/chills  Respiratory: No dyspnea, no cough, no wheeze  CV: No chest pain, no palpitations  Abd: No nausea  Neuro: No headache, no dizziness    No Known Allergies    Objective:  Vital Signs Last 24 Hrs  T(C): 36.3 (21 Sep 2021 14:09), Max: 36.3 (21 Sep 2021 14:09)  T(F): 97.4 (21 Sep 2021 14:09), Max: 97.4 (21 Sep 2021 14:09)  HR: 65 (21 Sep 2021 20:30) (60 - 72)  BP: 172/78 (21 Sep 2021 20:30) (156/74 - 220/85)  RR: 18 (21 Sep 2021 20:30) (13 - 20)  SpO2: 97% (21 Sep 2021 20:30) (96% - 100%)    CM: SR  Neuro: A&OX3, CN 2-12 intact  HEENT: NC, AT  Lungs: CTA B/L  CV: S1, S2, no murmur, RRR  Abd: Soft  Extremity: Right radial band: no bleeding, fingers warm with good cap refil    EKG: NSR                        10.5   3.45  )-----------( 203      ( 21 Sep 2021 14:43 )             32.7     09-21    137  |  101  |  18.8  ----------------------------<  87  3.8   |  26.0  |  0.93    Ca    9.3      21 Sep 2021 14:43  Mg     2.1     09-21

## 2021-09-21 NOTE — DISCHARGE NOTE PROVIDER - CARE PROVIDER_API CALL
Juan Mack)  Cardiovascular Disease; Interventional Cardiology  [address] [place] [address]  Phone: [phone]  Fax: [phone]  Established Patient  Follow Up Time: 2 weeks

## 2021-09-21 NOTE — DISCHARGE NOTE PROVIDER - HOSPITAL COURSE
75 year old female never smoker with history of HTN and pulmonary sarcoidosis who c/o occasional mid chest pain, not related to exertion, and SOB climbing < 1/2 flight of stairs. NST showed no evidence of ischemia or infarct, but hypokinesis of the proximal to distal lateral, inferolateral segments. Her echo showed hypokinetic basal inferolateral segment and basal inferior segment. She also admits to orthopnea and BLE edema. She had a LHC and PCI of the mLAD. 75 year old female never smoker with history of HTN and pulmonary sarcoidosis who c/o occasional mid chest pain, not related to exertion, and SOB climbing < 1/2 flight of stairs. NST showed no evidence of ischemia or infarct, but hypokinesis of the proximal to distal lateral, inferolateral segments. Her echo showed hypokinetic basal inferolateral segment and basal inferior segment. She also admits to orthopnea and BLE edema. S/P PCI of the mLAD    CAD   - s/p mLAD stent   - cardiac rehab info provided/referral and communication to cardiac rehab completed   - Patient educated about wrist site care, signs and symptoms of complication post procedure, and plan of care moving forward. Patient verbalized understanding with teach-back.   - STAT echo showed a trivial anterior pericardial effusion with no evidence of tamponade.  - needs echo before DC to evaluate for worsening pericardial effusion.   - this AM without s/s of jeter's triad on physical exam   - Pt with stable vital signs, telemetry stable, physical exam unremarkable and unchanged from pre-procedural baseline, neurovascularly intact, ambulating, eating, review of systems completely negative. pt verbalized an understanding of the discharge teaching. Patient to follow up with Dr. Frederick RUTH within 1 week.   - continue ASA, plavix, valsartan, HCTZ, lipitor.   - start toprol xl 25mg daily   - Diet/lifestyle modifications and medication compliance heavily reinforced       Dispo   - DC if echo is without worsening pericardial effusion   - discussed with Frederick RUTH

## 2021-09-21 NOTE — DISCHARGE NOTE PROVIDER - NSDCCPCAREPLAN_GEN_ALL_CORE_FT
PRINCIPAL DISCHARGE DIAGNOSIS  Diagnosis: Coronary artery disease of native artery of native heart with stable angina pectoris  Assessment and Plan of Treatment:   DAPT: Plavix 75mg daily and Aspirin 81mg daily  Statin: Lipitor 40 mg daily  Aggressive lifestyle modification and risk factor reduction.  Cardiac rehab info provided/referral and communication to cardiac rehab completed      SECONDARY DISCHARGE DIAGNOSES  Diagnosis: Primary hypertension  Assessment and Plan of Treatment:   Continue valsartan 80 mg daily  Continue HCTZ 12.5 mg daily    Diagnosis: Hyperlipidemia, unspecified hyperlipidemia type  Assessment and Plan of Treatment: Continue Liptor 40 mg daily

## 2021-09-22 LAB
A1C WITH ESTIMATED AVERAGE GLUCOSE RESULT: 5.4 % — SIGNIFICANT CHANGE UP (ref 4–5.6)
ANION GAP SERPL CALC-SCNC: 12 MMOL/L — SIGNIFICANT CHANGE UP (ref 5–17)
BUN SERPL-MCNC: 18 MG/DL — SIGNIFICANT CHANGE UP (ref 8–20)
CALCIUM SERPL-MCNC: 9.3 MG/DL — SIGNIFICANT CHANGE UP (ref 8.6–10.2)
CHLORIDE SERPL-SCNC: 101 MMOL/L — SIGNIFICANT CHANGE UP (ref 98–107)
CHOLEST SERPL-MCNC: 180 MG/DL — SIGNIFICANT CHANGE UP
CO2 SERPL-SCNC: 23 MMOL/L — SIGNIFICANT CHANGE UP (ref 22–29)
CREAT SERPL-MCNC: 0.91 MG/DL — SIGNIFICANT CHANGE UP (ref 0.5–1.3)
ESTIMATED AVERAGE GLUCOSE: 108 MG/DL — SIGNIFICANT CHANGE UP (ref 68–114)
GLUCOSE SERPL-MCNC: 88 MG/DL — SIGNIFICANT CHANGE UP (ref 70–99)
HCT VFR BLD CALC: 33.7 % — LOW (ref 34.5–45)
HDLC SERPL-MCNC: 93 MG/DL — SIGNIFICANT CHANGE UP
HGB BLD-MCNC: 10.9 G/DL — LOW (ref 11.5–15.5)
LIPID PNL WITH DIRECT LDL SERPL: 76 MG/DL — SIGNIFICANT CHANGE UP
MAGNESIUM SERPL-MCNC: 2.1 MG/DL — SIGNIFICANT CHANGE UP (ref 1.6–2.6)
MCHC RBC-ENTMCNC: 28.1 PG — SIGNIFICANT CHANGE UP (ref 27–34)
MCHC RBC-ENTMCNC: 32.3 GM/DL — SIGNIFICANT CHANGE UP (ref 32–36)
MCV RBC AUTO: 86.9 FL — SIGNIFICANT CHANGE UP (ref 80–100)
NON HDL CHOLESTEROL: 87 MG/DL — SIGNIFICANT CHANGE UP
PLATELET # BLD AUTO: 194 K/UL — SIGNIFICANT CHANGE UP (ref 150–400)
POTASSIUM SERPL-MCNC: 4 MMOL/L — SIGNIFICANT CHANGE UP (ref 3.5–5.3)
POTASSIUM SERPL-SCNC: 4 MMOL/L — SIGNIFICANT CHANGE UP (ref 3.5–5.3)
RBC # BLD: 3.88 M/UL — SIGNIFICANT CHANGE UP (ref 3.8–5.2)
RBC # FLD: 13.7 % — SIGNIFICANT CHANGE UP (ref 10.3–14.5)
SODIUM SERPL-SCNC: 136 MMOL/L — SIGNIFICANT CHANGE UP (ref 135–145)
TRIGL SERPL-MCNC: 53 MG/DL — SIGNIFICANT CHANGE UP
WBC # BLD: 4.19 K/UL — SIGNIFICANT CHANGE UP (ref 3.8–10.5)
WBC # FLD AUTO: 4.19 K/UL — SIGNIFICANT CHANGE UP (ref 3.8–10.5)

## 2021-09-22 PROCEDURE — 93308 TTE F-UP OR LMTD: CPT | Mod: 26

## 2021-09-22 PROCEDURE — 93010 ELECTROCARDIOGRAM REPORT: CPT

## 2021-09-22 RX ORDER — ASPIRIN/CALCIUM CARB/MAGNESIUM 324 MG
1 TABLET ORAL
Qty: 90 | Refills: 3
Start: 2021-09-22

## 2021-09-22 RX ORDER — METOPROLOL TARTRATE 50 MG
25 TABLET ORAL DAILY
Refills: 0 | Status: DISCONTINUED | OUTPATIENT
Start: 2021-09-22 | End: 2021-09-23

## 2021-09-22 RX ORDER — METOPROLOL TARTRATE 50 MG
1 TABLET ORAL
Qty: 90 | Refills: 3
Start: 2021-09-22

## 2021-09-22 RX ORDER — CLOPIDOGREL BISULFATE 75 MG/1
1 TABLET, FILM COATED ORAL
Qty: 90 | Refills: 3
Start: 2021-09-22

## 2021-09-22 RX ADMIN — Medication 25 MILLIGRAM(S): at 17:55

## 2021-09-22 RX ADMIN — Medication 81 MILLIGRAM(S): at 09:35

## 2021-09-22 RX ADMIN — ATORVASTATIN CALCIUM 40 MILLIGRAM(S): 80 TABLET, FILM COATED ORAL at 21:45

## 2021-09-22 RX ADMIN — VALSARTAN 80 MILLIGRAM(S): 80 TABLET ORAL at 05:36

## 2021-09-22 RX ADMIN — CLOPIDOGREL BISULFATE 75 MILLIGRAM(S): 75 TABLET, FILM COATED ORAL at 09:17

## 2021-09-22 RX ADMIN — Medication 12.5 MILLIGRAM(S): at 05:36

## 2021-09-22 NOTE — PROGRESS NOTE ADULT - SUBJECTIVE AND OBJECTIVE BOX
[facility] [id number]            [facility]                          [address] [place] [address]                       Phone: [phone]. Fax:[phone]                      ________________________________________________    HPI:  75 year old female never smoker with history of HTN and pulmonary sarcoidosis who c/o occasional mid chest pain, not related to exertion, and SOB climbing < 1/2 flight of stairs. NST showed no evidence of ischemia or infarct, but hypokinesis of the proximal to distal lateral, inferolateral segments. Her echo showed hypokinetic basal inferolateral segment and basal inferior segment. She also admits to orthopnea and BLE edema.     Symptoms:        Angina (Class): 4       Ischemic Symptoms: MCCOY (CCS class 3 anginal equivalent)    Heart Failure: N/A    Assessment of LVEF:       EF: 45-50%       Assessed by: echo       Date: 8/17/21    Prior Cardiac Interventions:       PCI's: N/A       CABG: N/A    Noninvasive Testing:   Stress Test: 7/27/2021       Protocol: Lexiscan       Duration of Exercise: N/A       Symptoms: SOB       EKG Changes: Normal       DTS: N/A       Myocardial Imaging: No evidence of ischemia or infarct. Hypokinesis of the proximal to distal lateral, inferolateral segments.       Risk Assessment: Low    Echo: 8/17/2021  Left Ventricle: Endocardial visualization was enhanced with intravenous echo contrast. The left ventricular internal cavity size is normal. Global LV systolic function was mildly decreased. Left ventricular ejection fraction, by visual estimation, is 45 to 50%. Spectral Doppler shows impaired relaxation pattern of left ventricular myocardial filling (Grade I diastolic dysfunction).  LV Wall Scoring: The basal inferolateral segment and basal inferior segment are hypokinetic.  Right Ventricle: The right ventricular size is normal. RV systolic function is normal. TV S' 0.1 m/s.  Left Atrium: Normal left atrial size.  Right Atrium: Normal right atrial size.  Pericardium: There is no evidence of pericardial effusion.  Mitral Valve: Thickening of the anterior and posterior mitral valve leaflets. There is mild mitral annular calcification. Mild mitral valve regurgitation is seen.  Tricuspid Valve: Mild tricuspid regurgitation is visualized.  Aortic Valve: The aortic valve is trileaflet. No evidence of aortic stenosis. No evidence of aortic valve regurgitation is seen. Mobile echodensity on the aortic valve suggestive of lambls.  Pulmonic Valve: The pulmonic valve is normal. Mild pulmonic valve regurgitation.    Antianginal Therapies:        Beta Blockers: N/A       Calcium Channel Blockers: N/A       Long Acting Nitrates: N/A       Ranexa: N/A    Associated Risk Factors:        Cerebrovascular Disease: N/A       Chronic Lung Disease: N/A       Peripheral Arterial Disease: N/A       Chronic Kidney Disease (if yes, what is GFR): N/A       Uncontrolled Diabetes (if yes, what is HgbA1C or FBS): N/A       Poorly Controlled Hypertension (if yes, what is SBP): N/A       Morbid Obesity (if yes, what is BMI): N/A       History of Recent Ventricular Arrhythmia: N/A       Inability to Ambulate Safely: N/A       Need for Therapeutic Anticoagulation: N/A       Antiplatelet or Contrast Allergy: N/A    Social History:        Marital: , lives in shelter       Tobacco: Never smoker       ETOH: Occasional beer       Caffeine: 1 can diet coke/day    ROS:   General: No fevers/chills, no fatigue  HEENT: + blurry vision, no hearing loss, no headaches, no epistaxis.  CV: + chest pain, + MCCOY, no palpitations, + BLE edema, + orthopnea, no PND.  Respiratory: No dyspnea, no wheeze, no cough.  GI: no nausea/vomiting, no black/bloody stools.  : No hematuria  Musculoskeletal: No myalgias, no arthralgias, shy back pain.  Neurologic: No weakness, no hemiparesis, no paresthesias, no seizures, no syncope/near syncope.    T(C): 36.3 (09-21-21 @ 14:09), Max: 36.3 (09-21-21 @ 14:09)  HR: 72 (09-21-21 @ 14:09)  BP: 156/74 (09-21-21 @ 14:09)  RR: 20 (09-21-21 @ 14:09)  SpO2: 100% (09-21-21 @ 14:09)    Physical Examination:   General: Awake, alert, speech clear, no acute distress.  HEENT: PERRL, EOMI.  Neck: No elevated JVP, no bruit, trachea midline.  Chest: CTA B/L, S1, S2, no murmur, RRR.  Abdomen: Soft, nontender, nondistended, normal bowel sounds.  Extremities: No edema, 2+ pulses X 4 extremities.  Neurologic: A&OX3, CN 2-12 grossly intact. (20 Sep 2021 11:33)    HOME MEDICATIONS:  aspirin 81 mg oral tablet, chewable: 1 tab(s) orally once a day (21 Sep 2021 13:44)  meclizine 25 mg oral tablet: 1 tab(s) orally 3 times a day, As Needed (22 Sep 2021 00:42)  Tylenol PM 1000 mg-50 mg/30 mL oral liquid: 30 milliliter(s) orally once a day (at bedtime) (22 Sep 2021 00:42)  valsartan-hydrochlorothiazide 80mg-12.5mg oral tablet: 1 tab(s) orally once a day (22 Sep 2021 00:42)      ROS: All review of systems negative unless indicated otherwise below.                         PHYSICAL EXAM:    GENERAL: NAD  NECK: Supple, No JVD  NERVOUS SYSTEM:  Alert & Oriented X3, non focal neuro exam.   CHEST/LUNG: clear lungs, No rales, rhonchi, wheezing, or rubs  HEART: Regular rate and rhythm; s1 and s2 auscultated, No murmurs, rubs, or gallops  ABDOMEN: Soft, Nontender, Nondistended; Bowel sounds present and normoactive.   EXTREMITIES:  2+ Peripheral Pulses, No clubbing, cyanosis, or edema       Vital Signs Last 24 Hrs  T(C): 37.1 (22 Sep 2021 05:32), Max: 37.1 (22 Sep 2021 05:32)  T(F): 98.7 (22 Sep 2021 05:32), Max: 98.7 (22 Sep 2021 05:32)  HR: 67 (22 Sep 2021 05:32) (60 - 77)  BP: 151/97 (22 Sep 2021 05:32) (135/77 - 220/85)  BP(mean): --  RR: 17 (22 Sep 2021 05:32) (13 - 20)  SpO2: 99% (22 Sep 2021 05:32) (95% - 100%)                                                   DAILY WEIGHTS - 48 HOUR TREND     Daily                              INTAKE AND OUTPUT - 48 HOUR TREND     09-21-21 @ 07:01  -  09-22-21 @ 07:00  --------------------------------------------------------  IN:  Total IN: 0 mL    OUT:    Voided (mL): 400 mL  Total OUT: 400 mL    Total NET: -400 mL                                                           LAB RESULTS                 COMPLETE BLOOD COUNT( 22 Sep 2021 06:42 )                            10.9 g/dL<L>  4.19 K/uL )---------------( 194 K/uL                        33.7 %<L>      Automated Differential     Auto Basophil # - X      Auto Basophil % - X      Auto Eosinophil # - X      Auto Eosinophil % - X      Auto Immature Granulocyte # - X      Auto Immature Granulocyte % - X      Auto Lymphocyte # - X      Auto Lymphocyte % - X      Auto Monocyte # - X      Auto Monocyte % - X      Auto Neutrophil # - X      Auto Neutrophil % - X                                      CHEMISTRY                 Basic Metabolic Panel (09-22-21 @ 06:42)    136  |  101  |  18.0  ----------------------------<  88  4.0   |  23.0  |  0.91    Ca    9.3      22 Sep 2021 06:42  Mg     2.1     09-22                             Current Admission Active Medications    aspirin  chewable 81 milliGRAM(s) Oral daily  atorvastatin 40 milliGRAM(s) Oral at bedtime  clopidogrel Tablet 75 milliGRAM(s) Oral daily  hydrochlorothiazide 12.5 milliGRAM(s) Oral daily  valsartan 80 milliGRAM(s) Oral daily                            CARDIOLOGY RESULTS: Official Report/Preliminary Verbal Reports  < from: TTE Echo Complete w/o Contrast w/ Doppler (08.17.21 @ 14:44) >   1. Left ventricular ejection fraction, by visual estimation, is 45 to 50%.   2. Mildly decreased global left ventricular systolic function.   3. Basal inferolateral segment and basal inferior segment are abnormal as described above.   4. Spectral Doppler shows impaired relaxation pattern of left ventricular myocardial filling (Grade I diastolic dysfunction).   5. There is moderate concentric left ventricular hypertrophy.   6. Normal left atrial size.   7. Normal right atrial size.   8. There is no evidence of pericardial effusion.   9. Mild mitral annular calcification.  10. Mild mitral valve regurgitation.  11. Thickening of the anterior and posterior mitral valve leaflets.  12. Mild tricuspid regurgitation.  13. Mild pulmonic valve regurgitation.  14. Endocardial visualization was enhanced with intravenous echo contrast.    MD Dave Electronically signed on 8/17/2021 at 3:30:42 PM    < end of copied text >    < from: Cardiac Catheterization (09.21.21 @ 18:06) >  VENTRICLES: There were no left ventricular global or regional wall motion  abnormalities. Global left ventricular function was normal. EF estimated  was 65 %.  VALVES: MITRAL VALVE: The mitral valve exhibited no regurgitation.  CORONARY VESSELS: The coronary circulation is right dominant.  LM:   --  Ostial LM: There was a discrete 25 % stenosis.  LAD:   --  Proximal LAD: There was a discrete 30 % stenosis.  --  Mid LAD: There was a tubular 80 % stenosis.  --  D1: There was a diffuse 40 % stenosis.  CX:   --  Mid circumflex: Angiography showed mild atherosclerosis with no  flow limiting lesions.  RCA:   --  RCA: Normal.  COMPLICATIONS: Dye extravasation noted after stent deployment occurred  during the cath lab visit. The area was balloon tamponaded first followed  by delivery and deployment of a Papyrus covered stent. No further dye  extravasation was noted. There was no hemodynamic compromise. STAT echo  showed a trivial anterior pericardial effusion with no evidence of  tamponade.  SUMMARY:  1ST LESION INTERVENTIONS: A successful drug-eluting stent was performed on  the 80 % lesion in the mid LAD. Following intervention there was an  excellent angiographic appearance with a 0 % residual stenosis.  INTERVENTIONAL RECOMMENDATIONS: Add clopidogrel (Plavix), 75 mg, PO, daily.  Add aspirin, 81 mg, PO, daily.  Prepared and signed by  Juan Mack MD  Signed 09/21/2021 19:59:14    < end of copied text >                          CARDIOLOGY REVIEW: Personally visualized and reviewed  Telemetry:  SR 80s              [facility] [id number]            [facility]                          [address] [place] [address]                       Phone: [phone]. Fax:[phone]                      ________________________________________________    HPI:  75 year old female never smoker with history of HTN and pulmonary sarcoidosis who c/o occasional mid chest pain, not related to exertion, and SOB climbing < 1/2 flight of stairs. NST showed no evidence of ischemia or infarct, but hypokinesis of the proximal to distal lateral, inferolateral segments. Her echo showed hypokinetic basal inferolateral segment and basal inferior segment. She also admits to orthopnea and BLE edema.     Symptoms:        Angina (Class): 4       Ischemic Symptoms: MCCOY (CCS class 3 anginal equivalent)    Heart Failure: N/A    Assessment of LVEF:       EF: 45-50%       Assessed by: echo       Date: 8/17/21    Prior Cardiac Interventions:       PCI's: N/A       CABG: N/A    Noninvasive Testing:   Stress Test: 7/27/2021       Protocol: Lexiscan       Duration of Exercise: N/A       Symptoms: SOB       EKG Changes: Normal       DTS: N/A       Myocardial Imaging: No evidence of ischemia or infarct. Hypokinesis of the proximal to distal lateral, inferolateral segments.       Risk Assessment: Low    Echo: 8/17/2021  Left Ventricle: Endocardial visualization was enhanced with intravenous echo contrast. The left ventricular internal cavity size is normal. Global LV systolic function was mildly decreased. Left ventricular ejection fraction, by visual estimation, is 45 to 50%. Spectral Doppler shows impaired relaxation pattern of left ventricular myocardial filling (Grade I diastolic dysfunction).  LV Wall Scoring: The basal inferolateral segment and basal inferior segment are hypokinetic.  Right Ventricle: The right ventricular size is normal. RV systolic function is normal. TV S' 0.1 m/s.  Left Atrium: Normal left atrial size.  Right Atrium: Normal right atrial size.  Pericardium: There is no evidence of pericardial effusion.  Mitral Valve: Thickening of the anterior and posterior mitral valve leaflets. There is mild mitral annular calcification. Mild mitral valve regurgitation is seen.  Tricuspid Valve: Mild tricuspid regurgitation is visualized.  Aortic Valve: The aortic valve is trileaflet. No evidence of aortic stenosis. No evidence of aortic valve regurgitation is seen. Mobile echodensity on the aortic valve suggestive of lambls.  Pulmonic Valve: The pulmonic valve is normal. Mild pulmonic valve regurgitation.    Antianginal Therapies:        Beta Blockers: N/A       Calcium Channel Blockers: N/A       Long Acting Nitrates: N/A       Ranexa: N/A    Associated Risk Factors:        Cerebrovascular Disease: N/A       Chronic Lung Disease: N/A       Peripheral Arterial Disease: N/A       Chronic Kidney Disease (if yes, what is GFR): N/A       Uncontrolled Diabetes (if yes, what is HgbA1C or FBS): N/A       Poorly Controlled Hypertension (if yes, what is SBP): N/A       Morbid Obesity (if yes, what is BMI): N/A       History of Recent Ventricular Arrhythmia: N/A       Inability to Ambulate Safely: N/A       Need for Therapeutic Anticoagulation: N/A       Antiplatelet or Contrast Allergy: N/A    Social History:        Marital: , lives in shelter       Tobacco: Never smoker       ETOH: Occasional beer       Caffeine: 1 can diet coke/day    ROS:   General: No fevers/chills, no fatigue  HEENT: + blurry vision, no hearing loss, no headaches, no epistaxis.  CV: + chest pain, + MCCOY, no palpitations, + BLE edema, + orthopnea, no PND.  Respiratory: No dyspnea, no wheeze, no cough.  GI: no nausea/vomiting, no black/bloody stools.  : No hematuria  Musculoskeletal: No myalgias, no arthralgias, shy back pain.  Neurologic: No weakness, no hemiparesis, no paresthesias, no seizures, no syncope/near syncope.    T(C): 36.3 (09-21-21 @ 14:09), Max: 36.3 (09-21-21 @ 14:09)  HR: 72 (09-21-21 @ 14:09)  BP: 156/74 (09-21-21 @ 14:09)  RR: 20 (09-21-21 @ 14:09)  SpO2: 100% (09-21-21 @ 14:09)    Physical Examination:   General: Awake, alert, speech clear, no acute distress.  HEENT: PERRL, EOMI.  Neck: No elevated JVP, no bruit, trachea midline.  Chest: CTA B/L, S1, S2, no murmur, RRR.  Abdomen: Soft, nontender, nondistended, normal bowel sounds.  Extremities: No edema, 2+ pulses X 4 extremities.  Neurologic: A&OX3, CN 2-12 grossly intact. (20 Sep 2021 11:33)    HOME MEDICATIONS:  aspirin 81 mg oral tablet, chewable: 1 tab(s) orally once a day (21 Sep 2021 13:44)  meclizine 25 mg oral tablet: 1 tab(s) orally 3 times a day, As Needed (22 Sep 2021 00:42)  Tylenol PM 1000 mg-50 mg/30 mL oral liquid: 30 milliliter(s) orally once a day (at bedtime) (22 Sep 2021 00:42)  valsartan-hydrochlorothiazide 80mg-12.5mg oral tablet: 1 tab(s) orally once a day (22 Sep 2021 00:42)      ROS: All review of systems negative unless indicated otherwise below.                         PHYSICAL EXAM:    GENERAL: NAD  NECK: Supple, No JVD  NERVOUS SYSTEM:  Alert & Oriented X3, non focal neuro exam.   CHEST/LUNG: clear lungs, No rales, rhonchi, wheezing, or rubs  HEART: Regular rate and rhythm; s1 and s2 auscultated, No murmurs, rubs, or gallops  ABDOMEN: Soft, Nontender, Nondistended; Bowel sounds present and normoactive.   EXTREMITIES:  2+ Peripheral Pulses, No clubbing, cyanosis, or edema  CATH SITE: Right wrist benign s/p cath.  No bleeding, no ecchymosis, no hematoma. Extremity Warm to touch, with palpable distal pulses, and brisk capillary refill.        Vital Signs Last 24 Hrs  T(C): 37.1 (22 Sep 2021 05:32), Max: 37.1 (22 Sep 2021 05:32)  T(F): 98.7 (22 Sep 2021 05:32), Max: 98.7 (22 Sep 2021 05:32)  HR: 67 (22 Sep 2021 05:32) (60 - 77)  BP: 151/97 (22 Sep 2021 05:32) (135/77 - 220/85)  BP(mean): --  RR: 17 (22 Sep 2021 05:32) (13 - 20)  SpO2: 99% (22 Sep 2021 05:32) (95% - 100%)                                                   DAILY WEIGHTS - 48 HOUR TREND     Daily                              INTAKE AND OUTPUT - 48 HOUR TREND     09-21-21 @ 07:01  -  09-22-21 @ 07:00  --------------------------------------------------------  IN:  Total IN: 0 mL    OUT:    Voided (mL): 400 mL  Total OUT: 400 mL    Total NET: -400 mL                                                           LAB RESULTS                 COMPLETE BLOOD COUNT( 22 Sep 2021 06:42 )                            10.9 g/dL<L>  4.19 K/uL )---------------( 194 K/uL                        33.7 %<L>      Automated Differential     Auto Basophil # - X      Auto Basophil % - X      Auto Eosinophil # - X      Auto Eosinophil % - X      Auto Immature Granulocyte # - X      Auto Immature Granulocyte % - X      Auto Lymphocyte # - X      Auto Lymphocyte % - X      Auto Monocyte # - X      Auto Monocyte % - X      Auto Neutrophil # - X      Auto Neutrophil % - X                                      CHEMISTRY                 Basic Metabolic Panel (09-22-21 @ 06:42)    136  |  101  |  18.0  ----------------------------<  88  4.0   |  23.0  |  0.91    Ca    9.3      22 Sep 2021 06:42  Mg     2.1     09-22                             Current Admission Active Medications    aspirin  chewable 81 milliGRAM(s) Oral daily  atorvastatin 40 milliGRAM(s) Oral at bedtime  clopidogrel Tablet 75 milliGRAM(s) Oral daily  hydrochlorothiazide 12.5 milliGRAM(s) Oral daily  valsartan 80 milliGRAM(s) Oral daily                            CARDIOLOGY RESULTS: Official Report/Preliminary Verbal Reports  < from: TTE Echo Complete w/o Contrast w/ Doppler (08.17.21 @ 14:44) >   1. Left ventricular ejection fraction, by visual estimation, is 45 to 50%.   2. Mildly decreased global left ventricular systolic function.   3. Basal inferolateral segment and basal inferior segment are abnormal as described above.   4. Spectral Doppler shows impaired relaxation pattern of left ventricular myocardial filling (Grade I diastolic dysfunction).   5. There is moderate concentric left ventricular hypertrophy.   6. Normal left atrial size.   7. Normal right atrial size.   8. There is no evidence of pericardial effusion.   9. Mild mitral annular calcification.  10. Mild mitral valve regurgitation.  11. Thickening of the anterior and posterior mitral valve leaflets.  12. Mild tricuspid regurgitation.  13. Mild pulmonic valve regurgitation.  14. Endocardial visualization was enhanced with intravenous echo contrast.    MD Dave Electronically signed on 8/17/2021 at 3:30:42 PM    < end of copied text >    < from: Cardiac Catheterization (09.21.21 @ 18:06) >  VENTRICLES: There were no left ventricular global or regional wall motion  abnormalities. Global left ventricular function was normal. EF estimated  was 65 %.  VALVES: MITRAL VALVE: The mitral valve exhibited no regurgitation.  CORONARY VESSELS: The coronary circulation is right dominant.  LM:   --  Ostial LM: There was a discrete 25 % stenosis.  LAD:   --  Proximal LAD: There was a discrete 30 % stenosis.  --  Mid LAD: There was a tubular 80 % stenosis.  --  D1: There was a diffuse 40 % stenosis.  CX:   --  Mid circumflex: Angiography showed mild atherosclerosis with no  flow limiting lesions.  RCA:   --  RCA: Normal.  COMPLICATIONS: Dye extravasation noted after stent deployment occurred  during the cath lab visit. The area was balloon tamponaded first followed  by delivery and deployment of a Papyrus covered stent. No further dye  extravasation was noted. There was no hemodynamic compromise. STAT echo  showed a trivial anterior pericardial effusion with no evidence of  tamponade.  SUMMARY:  1ST LESION INTERVENTIONS: A successful drug-eluting stent was performed on  the 80 % lesion in the mid LAD. Following intervention there was an  excellent angiographic appearance with a 0 % residual stenosis.  INTERVENTIONAL RECOMMENDATIONS: Add clopidogrel (Plavix), 75 mg, PO, daily.  Add aspirin, 81 mg, PO, daily.  Prepared and signed by  Juan Mack MD  Signed 09/21/2021 19:59:14    < end of copied text >                          CARDIOLOGY REVIEW: Personally visualized and reviewed  Telemetry:  SR 80s

## 2021-09-22 NOTE — PROGRESS NOTE ADULT - ASSESSMENT
75 year old female never smoker with history of HTN and pulmonary sarcoidosis who c/o occasional mid chest pain, not related to exertion, and SOB climbing < 1/2 flight of stairs. NST showed no evidence of ischemia or infarct, but hypokinesis of the proximal to distal lateral, inferolateral segments. Her echo showed hypokinetic basal inferolateral segment and basal inferior segment. She also admits to orthopnea and BLE edema. S/P PCI of the mLAD    CAD   - s/p mLAD stent   - cardiac rehab info provided/referral and communication to cardiac rehab completed   - Patient educated about wrist site care, signs and symptoms of complication post procedure, and plan of care moving forward. Patient verbalized understanding with teach-back.   - STAT echo showed a trivial anterior pericardial effusion with no evidence of tamponade.  - needs echo before DC to evaluate for worsening pericardial effusion.   - this AM without s/s of jeter's triad on physical exam   - Pt with stable vital signs, telemetry stable, physical exam unremarkable and unchanged from pre-procedural baseline, neurovascularly intact, ambulating, eating, review of systems completely negative. pt verbalized an understanding of the discharge teaching. Patient to follow up with Dr. Frederick RUTH within 1 week.   - continue ASA, plavix, valsartan, HCTZ, lipitor.   - start toprol xl 25mg daily   - Diet/lifestyle modifications and medication compliance heavily reinforced     Dispo   - DC if echo is without worsening pericardial effusion   - discussed with Frederick RUTH   75 year old female never smoker with history of HTN and pulmonary sarcoidosis who c/o occasional mid chest pain, not related to exertion, and SOB climbing < 1/2 flight of stairs. NST showed no evidence of ischemia or infarct, but hypokinesis of the proximal to distal lateral, inferolateral segments. Her echo showed hypokinetic basal inferolateral segment and basal inferior segment. She also admits to orthopnea and BLE edema. S/P PCI of the mLAD    CAD   - s/p mLAD stent   - cardiac rehab info provided/referral and communication to cardiac rehab completed   - Patient educated about wrist site care, signs and symptoms of complication post procedure, and plan of care moving forward. Patient verbalized understanding with teach-back.   - STAT echo showed a trivial anterior pericardial effusion with no evidence of tamponade.  - needs echo before DC to evaluate for worsening pericardial effusion.   - this AM without s/s of jeter's triad on physical exam   - Pt with stable vital signs, telemetry stable, physical exam unremarkable and unchanged from pre-procedural baseline, neurovascularly intact, ambulating, eating, review of systems completely negative. pt verbalized an understanding of the discharge teaching. Patient to follow up with Dr. Frederick RUTH within 1 week.   - continue ASA, plavix, valsartan, HCTZ, lipitor.   - start toprol xl 25mg daily   - Diet/lifestyle modifications and medication compliance heavily reinforced       Dispo   - DC if echo is without worsening pericardial effusion   - discussed with Frederick RUTH

## 2021-09-23 ENCOUNTER — TRANSCRIPTION ENCOUNTER (OUTPATIENT)
Age: 76
End: 2021-09-23

## 2021-09-23 VITALS
DIASTOLIC BLOOD PRESSURE: 81 MMHG | TEMPERATURE: 98 F | RESPIRATION RATE: 17 BRPM | OXYGEN SATURATION: 95 % | SYSTOLIC BLOOD PRESSURE: 153 MMHG | HEART RATE: 75 BPM

## 2021-09-23 PROCEDURE — 80061 LIPID PANEL: CPT

## 2021-09-23 PROCEDURE — C9600: CPT | Mod: LD

## 2021-09-23 PROCEDURE — 80048 BASIC METABOLIC PNL TOTAL CA: CPT

## 2021-09-23 PROCEDURE — 86850 RBC ANTIBODY SCREEN: CPT

## 2021-09-23 PROCEDURE — 36415 COLL VENOUS BLD VENIPUNCTURE: CPT

## 2021-09-23 PROCEDURE — 97163 PT EVAL HIGH COMPLEX 45 MIN: CPT

## 2021-09-23 PROCEDURE — 93005 ELECTROCARDIOGRAM TRACING: CPT

## 2021-09-23 PROCEDURE — C1894: CPT

## 2021-09-23 PROCEDURE — C1725: CPT

## 2021-09-23 PROCEDURE — C1887: CPT

## 2021-09-23 PROCEDURE — 86901 BLOOD TYPING SEROLOGIC RH(D): CPT

## 2021-09-23 PROCEDURE — 83735 ASSAY OF MAGNESIUM: CPT

## 2021-09-23 PROCEDURE — 93458 L HRT ARTERY/VENTRICLE ANGIO: CPT | Mod: XU

## 2021-09-23 PROCEDURE — C1874: CPT

## 2021-09-23 PROCEDURE — 93308 TTE F-UP OR LMTD: CPT

## 2021-09-23 PROCEDURE — 83036 HEMOGLOBIN GLYCOSYLATED A1C: CPT

## 2021-09-23 PROCEDURE — C1769: CPT

## 2021-09-23 PROCEDURE — 85027 COMPLETE CBC AUTOMATED: CPT

## 2021-09-23 PROCEDURE — 86900 BLOOD TYPING SEROLOGIC ABO: CPT

## 2021-09-23 RX ADMIN — VALSARTAN 80 MILLIGRAM(S): 80 TABLET ORAL at 05:11

## 2021-09-23 RX ADMIN — Medication 81 MILLIGRAM(S): at 12:00

## 2021-09-23 RX ADMIN — CLOPIDOGREL BISULFATE 75 MILLIGRAM(S): 75 TABLET, FILM COATED ORAL at 12:00

## 2021-09-23 RX ADMIN — Medication 25 MILLIGRAM(S): at 05:11

## 2021-09-23 RX ADMIN — Medication 12.5 MILLIGRAM(S): at 05:11

## 2021-09-23 NOTE — DISCHARGE NOTE NURSING/CASE MANAGEMENT/SOCIAL WORK - PATIENT PORTAL LINK FT
You can access the FollowMyHealth Patient Portal offered by Health system by registering at the following website: [email or web address]. By joining [employer]’s FollowMyHealth portal, you will also be able to view your health information using other applications (apps) compatible with our system.

## 2021-10-01 ENCOUNTER — OUTPATIENT (OUTPATIENT)
Dept: OUTPATIENT SERVICES | Facility: HOSPITAL | Age: 76
LOS: 1 days | End: 2021-10-01
Payer: MEDICARE

## 2021-10-01 DIAGNOSIS — Z98.890 OTHER SPECIFIED POSTPROCEDURAL STATES: Chronic | ICD-10-CM

## 2021-10-01 DIAGNOSIS — Z96.642 PRESENCE OF LEFT ARTIFICIAL HIP JOINT: Chronic | ICD-10-CM

## 2021-10-04 DIAGNOSIS — Z71.89 OTHER SPECIFIED COUNSELING: ICD-10-CM

## 2021-10-05 PROBLEM — I10 ESSENTIAL (PRIMARY) HYPERTENSION: Chronic | Status: ACTIVE | Noted: 2021-09-21

## 2021-10-05 PROBLEM — I10 ESSENTIAL (PRIMARY) HYPERTENSION: Chronic | Status: INACTIVE | Noted: 2021-08-17 | Resolved: 2021-09-21

## 2021-10-05 PROBLEM — D86.0 SARCOIDOSIS OF LUNG: Chronic | Status: ACTIVE | Noted: 2021-09-21

## 2021-10-05 PROBLEM — D86.9 SARCOIDOSIS, UNSPECIFIED: Chronic | Status: INACTIVE | Noted: 2021-08-17 | Resolved: 2021-09-21

## 2021-10-10 ENCOUNTER — INPATIENT (INPATIENT)
Facility: HOSPITAL | Age: 76
LOS: 1 days | Discharge: HOME | DRG: 287 | End: 2021-10-12
Attending: STUDENT IN AN ORGANIZED HEALTH CARE EDUCATION/TRAINING PROGRAM | Admitting: HOSPITALIST
Payer: MEDICARE

## 2021-10-10 VITALS
TEMPERATURE: 98 F | WEIGHT: 160.06 LBS | RESPIRATION RATE: 19 BRPM | HEART RATE: 131 BPM | HEIGHT: 68 IN | SYSTOLIC BLOOD PRESSURE: 170 MMHG | DIASTOLIC BLOOD PRESSURE: 90 MMHG | OXYGEN SATURATION: 98 %

## 2021-10-10 DIAGNOSIS — Z98.890 OTHER SPECIFIED POSTPROCEDURAL STATES: Chronic | ICD-10-CM

## 2021-10-10 DIAGNOSIS — Z96.642 PRESENCE OF LEFT ARTIFICIAL HIP JOINT: Chronic | ICD-10-CM

## 2021-10-10 DIAGNOSIS — R07.9 CHEST PAIN, UNSPECIFIED: ICD-10-CM

## 2021-10-10 DIAGNOSIS — Z59.01 SHELTERED HOMELESSNESS: ICD-10-CM

## 2021-10-10 LAB
ALBUMIN SERPL ELPH-MCNC: 3.7 G/DL — SIGNIFICANT CHANGE UP (ref 3.3–5)
ALP SERPL-CCNC: 66 U/L — SIGNIFICANT CHANGE UP (ref 40–120)
ALT FLD-CCNC: 24 U/L — SIGNIFICANT CHANGE UP (ref 12–78)
ANION GAP SERPL CALC-SCNC: 5 MMOL/L — SIGNIFICANT CHANGE UP (ref 5–17)
APPEARANCE UR: CLEAR — SIGNIFICANT CHANGE UP
APTT BLD: 32.1 SEC — SIGNIFICANT CHANGE UP (ref 27.5–35.5)
AST SERPL-CCNC: 27 U/L — SIGNIFICANT CHANGE UP (ref 15–37)
BASOPHILS # BLD AUTO: 0.02 K/UL — SIGNIFICANT CHANGE UP (ref 0–0.2)
BASOPHILS NFR BLD AUTO: 0.6 % — SIGNIFICANT CHANGE UP (ref 0–2)
BILIRUB SERPL-MCNC: 0.2 MG/DL — SIGNIFICANT CHANGE UP (ref 0.2–1.2)
BILIRUB UR-MCNC: NEGATIVE — SIGNIFICANT CHANGE UP
BUN SERPL-MCNC: 19 MG/DL — SIGNIFICANT CHANGE UP (ref 7–23)
CALCIUM SERPL-MCNC: 8.9 MG/DL — SIGNIFICANT CHANGE UP (ref 8.5–10.1)
CHLORIDE SERPL-SCNC: 106 MMOL/L — SIGNIFICANT CHANGE UP (ref 96–108)
CO2 SERPL-SCNC: 28 MMOL/L — SIGNIFICANT CHANGE UP (ref 22–31)
COLOR SPEC: YELLOW — SIGNIFICANT CHANGE UP
CREAT SERPL-MCNC: 1.02 MG/DL — SIGNIFICANT CHANGE UP (ref 0.5–1.3)
D DIMER BLD IA.RAPID-MCNC: 198 NG/ML DDU — SIGNIFICANT CHANGE UP
DIFF PNL FLD: NEGATIVE — SIGNIFICANT CHANGE UP
EOSINOPHIL # BLD AUTO: 0.12 K/UL — SIGNIFICANT CHANGE UP (ref 0–0.5)
EOSINOPHIL NFR BLD AUTO: 3.7 % — SIGNIFICANT CHANGE UP (ref 0–6)
ERYTHROCYTE [SEDIMENTATION RATE] IN BLOOD: 41 MM/HR — HIGH (ref 0–20)
GLUCOSE SERPL-MCNC: 96 MG/DL — SIGNIFICANT CHANGE UP (ref 70–99)
GLUCOSE UR QL: NEGATIVE MG/DL — SIGNIFICANT CHANGE UP
HCT VFR BLD CALC: 34.2 % — LOW (ref 34.5–45)
HGB BLD-MCNC: 10.7 G/DL — LOW (ref 11.5–15.5)
IMM GRANULOCYTES NFR BLD AUTO: 0.3 % — SIGNIFICANT CHANGE UP (ref 0–1.5)
KETONES UR-MCNC: NEGATIVE — SIGNIFICANT CHANGE UP
LEUKOCYTE ESTERASE UR-ACNC: ABNORMAL
LYMPHOCYTES # BLD AUTO: 1.31 K/UL — SIGNIFICANT CHANGE UP (ref 1–3.3)
LYMPHOCYTES # BLD AUTO: 40.2 % — SIGNIFICANT CHANGE UP (ref 13–44)
MCHC RBC-ENTMCNC: 27.8 PG — SIGNIFICANT CHANGE UP (ref 27–34)
MCHC RBC-ENTMCNC: 31.3 GM/DL — LOW (ref 32–36)
MCV RBC AUTO: 88.8 FL — SIGNIFICANT CHANGE UP (ref 80–100)
MONOCYTES # BLD AUTO: 0.35 K/UL — SIGNIFICANT CHANGE UP (ref 0–0.9)
MONOCYTES NFR BLD AUTO: 10.7 % — SIGNIFICANT CHANGE UP (ref 2–14)
NEUTROPHILS # BLD AUTO: 1.45 K/UL — LOW (ref 1.8–7.4)
NEUTROPHILS NFR BLD AUTO: 44.5 % — SIGNIFICANT CHANGE UP (ref 43–77)
NITRITE UR-MCNC: NEGATIVE — SIGNIFICANT CHANGE UP
NT-PROBNP SERPL-SCNC: 727 PG/ML — HIGH (ref 0–450)
PH UR: 7 — SIGNIFICANT CHANGE UP (ref 5–8)
PLATELET # BLD AUTO: 225 K/UL — SIGNIFICANT CHANGE UP (ref 150–400)
POTASSIUM SERPL-MCNC: 3.9 MMOL/L — SIGNIFICANT CHANGE UP (ref 3.5–5.3)
POTASSIUM SERPL-SCNC: 3.9 MMOL/L — SIGNIFICANT CHANGE UP (ref 3.5–5.3)
PROT SERPL-MCNC: 8.1 GM/DL — SIGNIFICANT CHANGE UP (ref 6–8.3)
PROT UR-MCNC: 15 MG/DL
RBC # BLD: 3.85 M/UL — SIGNIFICANT CHANGE UP (ref 3.8–5.2)
RBC # FLD: 13.7 % — SIGNIFICANT CHANGE UP (ref 10.3–14.5)
SARS-COV-2 RNA SPEC QL NAA+PROBE: SIGNIFICANT CHANGE UP
SODIUM SERPL-SCNC: 139 MMOL/L — SIGNIFICANT CHANGE UP (ref 135–145)
SP GR SPEC: 1 — LOW (ref 1.01–1.02)
TROPONIN I, HIGH SENSITIVITY RESULT: 1277.6 NG/L — HIGH
TROPONIN I, HIGH SENSITIVITY RESULT: 31.6 NG/L — SIGNIFICANT CHANGE UP
UROBILINOGEN FLD QL: NEGATIVE MG/DL — SIGNIFICANT CHANGE UP
WBC # BLD: 3.26 K/UL — LOW (ref 3.8–10.5)
WBC # FLD AUTO: 3.26 K/UL — LOW (ref 3.8–10.5)

## 2021-10-10 PROCEDURE — 93010 ELECTROCARDIOGRAM REPORT: CPT | Mod: 77

## 2021-10-10 PROCEDURE — C1760: CPT

## 2021-10-10 PROCEDURE — 85027 COMPLETE CBC AUTOMATED: CPT

## 2021-10-10 PROCEDURE — 84484 ASSAY OF TROPONIN QUANT: CPT

## 2021-10-10 PROCEDURE — C1887: CPT

## 2021-10-10 PROCEDURE — 86769 SARS-COV-2 COVID-19 ANTIBODY: CPT

## 2021-10-10 PROCEDURE — 99497 ADVNCD CARE PLAN 30 MIN: CPT | Mod: 25

## 2021-10-10 PROCEDURE — 36415 COLL VENOUS BLD VENIPUNCTURE: CPT

## 2021-10-10 PROCEDURE — 99285 EMERGENCY DEPT VISIT HI MDM: CPT

## 2021-10-10 PROCEDURE — 85730 THROMBOPLASTIN TIME PARTIAL: CPT

## 2021-10-10 PROCEDURE — 99223 1ST HOSP IP/OBS HIGH 75: CPT

## 2021-10-10 PROCEDURE — 93010 ELECTROCARDIOGRAM REPORT: CPT

## 2021-10-10 PROCEDURE — 86140 C-REACTIVE PROTEIN: CPT

## 2021-10-10 PROCEDURE — 93306 TTE W/DOPPLER COMPLETE: CPT

## 2021-10-10 PROCEDURE — 85652 RBC SED RATE AUTOMATED: CPT

## 2021-10-10 PROCEDURE — 85379 FIBRIN DEGRADATION QUANT: CPT

## 2021-10-10 PROCEDURE — 93005 ELECTROCARDIOGRAM TRACING: CPT

## 2021-10-10 PROCEDURE — 71046 X-RAY EXAM CHEST 2 VIEWS: CPT | Mod: 26

## 2021-10-10 PROCEDURE — C1769: CPT

## 2021-10-10 PROCEDURE — C1894: CPT

## 2021-10-10 RX ORDER — HEPARIN SODIUM 5000 [USP'U]/ML
5000 INJECTION INTRAVENOUS; SUBCUTANEOUS EVERY 6 HOURS
Refills: 0 | Status: DISCONTINUED | OUTPATIENT
Start: 2021-10-10 | End: 2021-10-11

## 2021-10-10 RX ORDER — ASPIRIN/CALCIUM CARB/MAGNESIUM 324 MG
81 TABLET ORAL DAILY
Refills: 0 | Status: DISCONTINUED | OUTPATIENT
Start: 2021-10-10 | End: 2021-10-12

## 2021-10-10 RX ORDER — LANOLIN ALCOHOL/MO/W.PET/CERES
3 CREAM (GRAM) TOPICAL AT BEDTIME
Refills: 0 | Status: DISCONTINUED | OUTPATIENT
Start: 2021-10-10 | End: 2021-10-12

## 2021-10-10 RX ORDER — VALSARTAN 80 MG/1
80 TABLET ORAL DAILY
Refills: 0 | Status: DISCONTINUED | OUTPATIENT
Start: 2021-10-10 | End: 2021-10-11

## 2021-10-10 RX ORDER — METOPROLOL TARTRATE 50 MG
25 TABLET ORAL DAILY
Refills: 0 | Status: DISCONTINUED | OUTPATIENT
Start: 2021-10-10 | End: 2021-10-12

## 2021-10-10 RX ORDER — HEPARIN SODIUM 5000 [USP'U]/ML
5000 INJECTION INTRAVENOUS; SUBCUTANEOUS ONCE
Refills: 0 | Status: COMPLETED | OUTPATIENT
Start: 2021-10-10 | End: 2021-10-10

## 2021-10-10 RX ORDER — MECLIZINE HCL 12.5 MG
1 TABLET ORAL
Qty: 0 | Refills: 0 | DISCHARGE

## 2021-10-10 RX ORDER — LABETALOL HCL 100 MG
10 TABLET ORAL ONCE
Refills: 0 | Status: COMPLETED | OUTPATIENT
Start: 2021-10-10 | End: 2021-10-10

## 2021-10-10 RX ORDER — ATORVASTATIN CALCIUM 80 MG/1
40 TABLET, FILM COATED ORAL AT BEDTIME
Refills: 0 | Status: DISCONTINUED | OUTPATIENT
Start: 2021-10-10 | End: 2021-10-12

## 2021-10-10 RX ORDER — HYDROCHLOROTHIAZIDE 25 MG
12.5 TABLET ORAL DAILY
Refills: 0 | Status: DISCONTINUED | OUTPATIENT
Start: 2021-10-10 | End: 2021-10-12

## 2021-10-10 RX ORDER — HEPARIN SODIUM 5000 [USP'U]/ML
INJECTION INTRAVENOUS; SUBCUTANEOUS
Qty: 25000 | Refills: 0 | Status: DISCONTINUED | OUTPATIENT
Start: 2021-10-10 | End: 2021-10-11

## 2021-10-10 RX ORDER — NITROGLYCERIN 6.5 MG
0.4 CAPSULE, EXTENDED RELEASE ORAL
Refills: 0 | Status: DISCONTINUED | OUTPATIENT
Start: 2021-10-10 | End: 2021-10-12

## 2021-10-10 RX ORDER — CLOPIDOGREL BISULFATE 75 MG/1
75 TABLET, FILM COATED ORAL DAILY
Refills: 0 | Status: DISCONTINUED | OUTPATIENT
Start: 2021-10-10 | End: 2021-10-12

## 2021-10-10 RX ORDER — ACETAMINOPHEN 500 MG
650 TABLET ORAL EVERY 6 HOURS
Refills: 0 | Status: DISCONTINUED | OUTPATIENT
Start: 2021-10-10 | End: 2021-10-12

## 2021-10-10 RX ORDER — ONDANSETRON 8 MG/1
4 TABLET, FILM COATED ORAL EVERY 8 HOURS
Refills: 0 | Status: DISCONTINUED | OUTPATIENT
Start: 2021-10-10 | End: 2021-10-12

## 2021-10-10 RX ORDER — ASPIRIN/ACETAMINOPHEN/CAFFEINE 250-250-65
30 TABLET ORAL
Qty: 0 | Refills: 0 | DISCHARGE

## 2021-10-10 RX ADMIN — Medication 25 MILLIGRAM(S): at 22:56

## 2021-10-10 RX ADMIN — HEPARIN SODIUM 1000 UNIT(S)/HR: 5000 INJECTION INTRAVENOUS; SUBCUTANEOUS at 22:54

## 2021-10-10 RX ADMIN — HEPARIN SODIUM 5000 UNIT(S): 5000 INJECTION INTRAVENOUS; SUBCUTANEOUS at 22:55

## 2021-10-10 RX ADMIN — Medication 81 MILLIGRAM(S): at 22:56

## 2021-10-10 RX ADMIN — ATORVASTATIN CALCIUM 40 MILLIGRAM(S): 80 TABLET, FILM COATED ORAL at 22:56

## 2021-10-10 RX ADMIN — Medication 3 MILLIGRAM(S): at 23:16

## 2021-10-10 SDOH — ECONOMIC STABILITY - HOUSING INSECURITY: SHELTERED HOMELESSNESS: Z59.01

## 2021-10-10 NOTE — PROVIDER CONTACT NOTE (CRITICAL VALUE NOTIFICATION) - ACTION/TREATMENT ORDERED:
repeat EKG ordered. Resident Xiomara to see patient. repeat EKG ordered. Resident Bala to see patient.

## 2021-10-10 NOTE — CHART NOTE - NSCHARTNOTEFT_GEN_A_CORE
Called by RN at ~ 8:12pm to inform of increase in hsTnI level from 31.6 at ~ 11:41hrs to 1277.6 at 17:22hrs.     Patient is a 77 yo F with pmh CAD s/p PCI 2 wks ago at [facility] (9/21/2021, SHIRA to mLAD), pulmonary sarcoid,  HTN, HLD, HFpEF who presented today for midsternal chest pain radiating to right shoulder with associated diaphoresis and palpitations.  Patient took 3 doses of her aspirin and plavix pta. On arrival patient was found to have bp: 170/90 HR: 131. Initial EKG revealed TWI to V1, V2, V3 with high sensitivity troponin of 31.6.     Vital Signs Last 24 Hrs  T(C): 36.9 (10 Oct 2021 19:25), Max: 37 (10 Oct 2021 18:34)  T(F): 98.4 (10 Oct 2021 19:25), Max: 98.6 (10 Oct 2021 18:34)  HR: 70 (10 Oct 2021 19:25) (62 - 131)  BP: 144/82 (10 Oct 2021 19:25) (144/82 - 170/90)  RR: 18 (10 Oct 2021 19:25) (17 - 19)  SpO2: 100% (10 Oct 2021 19:25) (98% - 100%)      MEDICATIONS  (STANDING):  aspirin  chewable 81 milliGRAM(s) Oral daily  atorvastatin 40 milliGRAM(s) Oral at bedtime  clopidogrel Tablet 75 milliGRAM(s) Oral daily  heparin   Injectable 5000 Unit(s) IV Push once  heparin  Infusion.  Unit(s)/Hr (10 mL/Hr) IV Continuous <Continuous>  hydrochlorothiazide 12.5 milliGRAM(s) Oral daily  metoprolol succinate ER 25 milliGRAM(s) Oral daily  valsartan 80 milliGRAM(s) Oral daily    MEDICATIONS  (PRN):  acetaminophen   Tablet .. 650 milliGRAM(s) Oral every 6 hours PRN Mild Pain (1 - 3)  aluminum hydroxide/magnesium hydroxide/simethicone Suspension 30 milliLiter(s) Oral every 4 hours PRN Dyspepsia  heparin   Injectable 5000 Unit(s) IV Push every 6 hours PRN For aPTT less than 40  melatonin 3 milliGRAM(s) Oral at bedtime PRN Insomnia  nitroglycerin     SubLingual 0.4 milliGRAM(s) SubLingual every 5 minutes PRN Chest Pain  ondansetron Injectable 4 milliGRAM(s) IV Push every 8 hours PRN Nausea and/or Vomiting          LABS:    `Troponin I, High Sensitivity (10.10.21 @ 17:22)   Troponin I, High Sensitivity Result: 1277.6                 10.7   3.26  )-----------( 225      ( 10 Oct 2021 11:41 )             34.2     10 Oct 2021 11:41    139    |  106    |  19     ----------------------------<  96     3.9     |  28     |  1.02     Ca    8.9        10 Oct 2021 11:41    TPro  8.1    /  Alb  3.7    /  TBili  0.2    /  DBili  x      /  AST  27     /  ALT  24     /  AlkPhos  66     10 Oct 2021 11:41    LIVER FUNCTIONS - ( 10 Oct 2021 11:41 )  Alb: 3.7 g/dL / Pro: 8.1 gm/dL / ALK PHOS: 66 U/L / ALT: 24 U/L / AST: 27 U/L / GGT: x             A/P  NSTEMI   -Serial Trop I from 31.6 > 1277.6   -Repeat EKG unchanged, TWI V1,V2,V3  -Continue to monitor on telemetry   -Start heparin gtt  -Continue ASA qd, atorvastatin 40mg qd, Plavix 75mg qd  -F/u Echo  -Cardiology consulted: Spoke to Dr. Orellana, agrees with plan, start heparin gtt, will evaluate in the morning   -F/u repeat troponin Called by RN at ~ 8:12pm to inform of increase in hsTnI level from 31.6 at ~ 11:41hrs to 1277.6 at 17:22hrs.     Patient is a 77 yo F with pmh CAD s/p PCI 2 wks ago at [facility] (9/21/2021, SHIRA to mLAD), pulmonary sarcoid,  HTN, HLD, HFpEF who presented today for midsternal chest pain radiating to right shoulder with associated diaphoresis and palpitations.  Patient took 3 doses of her aspirin and plavix pta. On arrival patient was found to have bp: 170/90 HR: 131. Initial EKG revealed TWI to V1, V2, V3 with high sensitivity troponin of 31.6.     Vital Signs Last 24 Hrs  T(C): 36.9 (10 Oct 2021 19:25), Max: 37 (10 Oct 2021 18:34)  T(F): 98.4 (10 Oct 2021 19:25), Max: 98.6 (10 Oct 2021 18:34)  HR: 70 (10 Oct 2021 19:25) (62 - 131)  BP: 144/82 (10 Oct 2021 19:25) (144/82 - 170/90)  RR: 18 (10 Oct 2021 19:25) (17 - 19)  SpO2: 100% (10 Oct 2021 19:25) (98% - 100%)    PHYSICAL EXAM:  GENERAL: NAD, lying in bed comfortably  NECK: Supple, No JVD  CHEST/LUNG: Clear to auscultation bilaterally; No rales, rhonchi, wheezing, or rubs. Unlabored respirations  HEART: Regular rate and rhythm; No murmurs  ABDOMEN: Bowel sounds present; Soft, Nontender, Nondistended.  EXTREMITIES:  2+ Peripheral Pulses, brisk capillary refill. No clubbing, cyanosis, or edema  NERVOUS SYSTEM:  Alert & Oriented X3, speech clear. No deficits     MEDICATIONS  (STANDING):  aspirin  chewable 81 milliGRAM(s) Oral daily  atorvastatin 40 milliGRAM(s) Oral at bedtime  clopidogrel Tablet 75 milliGRAM(s) Oral daily  heparin   Injectable 5000 Unit(s) IV Push once  heparin  Infusion.  Unit(s)/Hr (10 mL/Hr) IV Continuous <Continuous>  hydrochlorothiazide 12.5 milliGRAM(s) Oral daily  metoprolol succinate ER 25 milliGRAM(s) Oral daily  valsartan 80 milliGRAM(s) Oral daily    MEDICATIONS  (PRN):  acetaminophen   Tablet .. 650 milliGRAM(s) Oral every 6 hours PRN Mild Pain (1 - 3)  aluminum hydroxide/magnesium hydroxide/simethicone Suspension 30 milliLiter(s) Oral every 4 hours PRN Dyspepsia  heparin   Injectable 5000 Unit(s) IV Push every 6 hours PRN For aPTT less than 40  melatonin 3 milliGRAM(s) Oral at bedtime PRN Insomnia  nitroglycerin     SubLingual 0.4 milliGRAM(s) SubLingual every 5 minutes PRN Chest Pain  ondansetron Injectable 4 milliGRAM(s) IV Push every 8 hours PRN Nausea and/or Vomiting          LABS:    `Troponin I, High Sensitivity (10.10.21 @ 17:22)   Troponin I, High Sensitivity Result: 1277.6                 10.7   3.26  )-----------( 225      ( 10 Oct 2021 11:41 )             34.2     10 Oct 2021 11:41    139    |  106    |  19     ----------------------------<  96     3.9     |  28     |  1.02     Ca    8.9        10 Oct 2021 11:41    TPro  8.1    /  Alb  3.7    /  TBili  0.2    /  DBili  x      /  AST  27     /  ALT  24     /  AlkPhos  66     10 Oct 2021 11:41    LIVER FUNCTIONS - ( 10 Oct 2021 11:41 )  Alb: 3.7 g/dL / Pro: 8.1 gm/dL / ALK PHOS: 66 U/L / ALT: 24 U/L / AST: 27 U/L / GGT: x             A/P  77 yo F with pmh CAD s/p PCI 2 wks ago at [facility] (9/21/2021, SHIRA to mLAD), pulmonary sarcoid,  HTN, HLD, HFpEF admitted for hypertensive emergency today, with serial troponin elevated to 1277.6.   NSTEMI, recent PCI on 9/21/2021 at [facility]  -Serial Trop I from 31.6 > 1277.6   -Repeat EKG unchanged, TWI V1,V2,V3  -NSR on telemetry   -Continue to monitor on telemetry   -Start heparin gtt  -Continue ASA qd, atorvastatin 40mg qd, Plavix 75mg qd  -F/u Echo, Echo 9/21: 35-40%, global LVEF on cath 9/2021 was 65%  -Cardiology consulted: Spoke to Dr. Orellana, agrees with plan, start heparin gtt, will evaluate in the morning   -F/u repeat troponin  -Cardiologist: Dr. Mack

## 2021-10-10 NOTE — ED PROVIDER NOTE - CLINICAL SUMMARY MEDICAL DECISION MAKING FREE TEXT BOX
76 year old female with midsternal chest pain concerned for . CBC, CMP, Troponin, UA, EKG order 76 year old female with midsternal chest pain concerned for ACS. CBC, CMP, Troponin, UA, EKG ordered. Will reevaluate.

## 2021-10-10 NOTE — PATIENT PROFILE ADULT - HOME/WORK/SCHOOL SAFETY PLAN
moving from shelter to apartment in 2 days In shelter, does not feel safe there. Notices some "sneaking around" in the shelter, thinks maybe something is being put in her food.

## 2021-10-10 NOTE — H&P ADULT - HISTORY OF PRESENT ILLNESS
75 yo F with pmh CAD s/p PCI 2 wks ago at [facility], HTN, HLD combined HFpEF/HFrEF now presents with new onset midsternal chest pain that radiates to right shoulder with associated diaphoresis, palpitations. Pain is not made better or worse with any particular factor/movement. Relieved with ASA. Took 3 doses of her aspiring and plavix pta. She denies cough or fevers. PT undomiciled and lives in shelter.   No n/v/d. No HA/dizzy.   TNI negative  CXR: negative  Echo 9/21: 35-40% echo from 8/20/21 EF 45-50%  LHC 9/2021: SHIRA to LAD        PAST MEDICAL/SURGICAL/FAMILY/SOCIAL HISTORY:    Past Medical, Past Surgical, and Family History:  PAST MEDICAL HISTORY:  Brain aneurysm     Chronic arthritis or osteoarthritis     COPD, mild     Primary hypertension     Pulmonary sarcoidosis     Rheumatoid arthritis.     PAST SURGICAL HISTORY:  History of breast biopsy     History of foot surgery     History of hand surgery     History of lung biopsy     History of surgery on arm     S/P hip replacement, left.     FAMILY HISTORY:  Father  Still living? No  Family history of cerebrovascular accident (CVA), Age at diagnosis: 61-70  Family history of myocardial infarction, Age at diagnosis: 61-70    Mother  Still living? No  Family history of schizophrenia, Age at diagnosis: Age Unknown    Grandparent  Still living? No  Family history of cardiac disorder, Age at diagnosis: Age Unknown.    · Social Concerns: Homeless     Tobacco Usage:  · Tobacco Usage	Never smoker   75 yo F with pmh CAD s/p PCI 2 wks ago at [facility], pulmonary sarcoid,  HTN, HLD combined HFpEF/HFrEF now presents with new onset midsternal chest pain that radiates to right shoulder with associated diaphoresis, palpitations. Pain is not made better or worse with any particular factor/movement. Relieved with ASA. Took 3 doses of her aspiring and plavix pta. She denies cough or fevers. PT undomiciled and lives in shelter.   No n/v/d. No HA/dizzy.   TNI negative  CXR: negative  Echo 9/21: 35-40% echo from 8/20/21 EF 45-50%  LHC 9/2021: SHIRA to LAD        PAST MEDICAL/SURGICAL/FAMILY/SOCIAL HISTORY:    Past Medical, Past Surgical, and Family History:  PAST MEDICAL HISTORY:  Brain aneurysm     Chronic arthritis or osteoarthritis     COPD, mild     Primary hypertension     Pulmonary sarcoidosis     Rheumatoid arthritis.     PAST SURGICAL HISTORY:  History of breast biopsy     History of foot surgery     History of hand surgery     History of lung biopsy     History of surgery on arm     S/P hip replacement, left.     FAMILY HISTORY:  Father  Still living? No  Family history of cerebrovascular accident (CVA), Age at diagnosis: 61-70  Family history of myocardial infarction, Age at diagnosis: 61-70    Mother  Still living? No  Family history of schizophrenia, Age at diagnosis: Age Unknown    Grandparent  Still living? No  Family history of cardiac disorder, Age at diagnosis: Age Unknown.    · Social Concerns: Homeless     Tobacco Usage:  · Tobacco Usage	Never smoker

## 2021-10-10 NOTE — H&P ADULT - NSHPPHYSICALEXAM_GEN_ALL_CORE
ICU Vital Signs Last 24 Hrs  T(C): 36.7 (10 Oct 2021 11:11), Max: 36.7 (10 Oct 2021 11:11)  T(F): 98.1 (10 Oct 2021 11:11), Max: 98.1 (10 Oct 2021 11:11)  HR: 62 (10 Oct 2021 12:14) (62 - 131)  BP: 154/92 (10 Oct 2021 12:14) (154/92 - 170/90)  BP(mean): --  ABP: --  ABP(mean): --  RR: 18 (10 Oct 2021 12:14) (18 - 19)  SpO2: 100% (10 Oct 2021 12:14) (98% - 100%)      PHYSICAL EXAM:    Constitutional: NAD, awake and alert, well-developed  HEENT: PERR, EOMI, Normal Hearing, MMM  Neck: Soft and supple, No LAD, No JVD  Respiratory: Breath sounds are clear bilaterally, No wheezing, rales or rhonchi  Cardiovascular: S1 and S2, regular rate and rhythm, no Murmurs, gallops or rubs  Gastrointestinal: Bowel Sounds present, soft, nontender, nondistended, no guarding, no rebound  Extremities: No peripheral edema  Vascular: 2+ peripheral pulses  Neurological: A/O x 3, no focal deficits  Musculoskeletal: 5/5 strength b/l upper and lower extremities  Skin: No rashes

## 2021-10-10 NOTE — ED ADULT NURSE NOTE - CHIEF COMPLAINT QUOTE
Pt comes to ED with c/o chest pain that started this morning. Pt took 243 ASA, valsartan and carvedilol which helped relieve symptoms. Pt had 2x stents placed at [facility] a few weeks ago for HTN. Pt cardiologist is Dr. Jacinto. Denies SOB, fever.

## 2021-10-10 NOTE — ED ADULT NURSE NOTE - NSICDXPASTMEDICALHX_GEN_ALL_CORE_FT
PAST MEDICAL HISTORY:  Brain aneurysm     Chronic arthritis or osteoarthritis     COPD, mild     Primary hypertension     Pulmonary sarcoidosis     Rheumatoid arthritis

## 2021-10-10 NOTE — ED ADULT NURSE NOTE - OBJECTIVE STATEMENT
pt is 77 yo male presents to ED for elevated blood pressure, pt states she took her own bp, and it was reading high, sbp 200s.  hr ST in 130s upon arrival to ED, pt denies any palpitations, cp, sob, or nvd.  pt's hr rhythm changed to NSR after placing pt on bedpan.  70s hr

## 2021-10-10 NOTE — PATIENT PROFILE ADULT - CONTRAINDICATIONS & PRECAUTIONS (SELECT ALL THAT APPLY)
2018    Dear Dr. Dwaine Lui  :2007    Today I had the pleasure of seeing [facility] for follow up of chronic constipation. Megan Vences is now 6 y.o. who is here with her mother and her younger brother. Enemas were stopped at last visit and since then no stool accidents. She is having a bowel movement most days which is easy to pass but sometimes misses a day. She takes miralax almost every day but sometimes misses a day. She takes 2 ex lax once weekly. She is not always consistent with her toilet sitting. She denies abdominal pain, emesis, diarrhea and blood in stool. She is otherwise growing and thriving. Review of systems per HPI otherwise twelve point review is negative. Past Medical History/Family History/Social History: As per HPI      CURRENT MEDICATIONS INCLUDE  Outpatient Prescriptions Marked as Taking for the 18 encounter (Office Visit) with Towanda Apley, NP   Medication Sig Dispense Refill    Sennosides (EX-LAX) 15 MG CHEW Take 30 mg by mouth once a week Please take as directed 20 tablet 2    polyethylene glycol (MIRALAX) powder Take 17 g by mouth daily As directed to achieve 2-3 soft stool daily 1 Bottle 5    sodium phosphate (FLEET) 3.5-9.5 GM/59ML enema Place 1 enema rectally once a week Please give once a week in the morning after 2 chewable exlax were given the night before 4 enema 1         ALLERGIES  No Known Allergies    PHYSICAL EXAM  Vital Signs:  /64 (Site: Right Arm, Position: Sitting)   Pulse 87   Temp 98.4 °F (36.9 °C) (Temporal)   Ht 4' 5.54\" (1.36 m)   Wt 77 lb (34.9 kg)   BMI 18.88 kg/m²   General:  Well-nourished, well-developed. No acute distress. Pleasant, interactive. HEENT:  No scleral icterus. Mucous membranes are moist and pink. No thyromegaly. Lungs are clear to auscultation bilaterally with equal breath sounds. Cardiovascular:  Regular rate and rhythm. No murmur. Capillary refill is <2 seconds. Abdomen is soft, nontender, nondistended. No organomegaly. Perianal exam:  deferred   Skin:  No jaundice, no bruising, no rash. Extremities:  No edema, no clubbing. No abnormally enlarged supraclavicular or axillary nodes. Neurological: Alert, aware of surroundings,  Normal gait      Results  Previously normal celiac and thyroid screen        Assessment    1. Chronic constipation            Plan     1. Jem Roberts is a 6year old with history of encopresis with constipation. Previously normal labs. Enemas stopped at last visit and no stool accidents since then. One stool most days but occasionally will miss. On exam there is moderate stool. I do recommend a clean out this weekend with 2 ex lax, 6 caps miralax in 32 oz fluid and another 2 ex lax. 2. Recommend to increase miralax to 1.5 caps per day in 8oz fluid every day. Be as consistent as possible with this. 3. Continue with 2 ex lax once weekly however if she misses a day of BM then give an additional 2 ex lax that night. 4. Work on being more consistent with toilet sitting. 5. We will see Jem Roberts in 3 months or sooner if needed. Thank you for allowing me to consult on this patient if you have any questions please do not hesitate to ask. Victorino Draper M.D.   Pediatric Gastroenterology Patient/surrogate refused vaccine...

## 2021-10-10 NOTE — H&P ADULT - NSHPLABSRESULTS_GEN_ALL_CORE
LABS: All Labs Reviewed:                        10.7   3.26  )-----------( 225      ( 10 Oct 2021 11:41 )             34.2     10-10    139  |  106  |  19  ----------------------------<  96  3.9   |  28  |  1.02    Ca    8.9      10 Oct 2021 11:41    TPro  8.1  /  Alb  3.7  /  TBili  0.2  /  DBili  x   /  AST  27  /  ALT  24  /  AlkPhos  66  10-10              Blood Culture:         EKG: NSR. Deep TWI (new) in leads V2-3  CXR: neg    < from: Xray Chest 2 Views PA/Lat (10.10.21 @ 11:47) >    Impression: No active pulmonary disease.    --- End of Report ---    < end of copied text >

## 2021-10-10 NOTE — ED PROVIDER NOTE - NS_EDPROVIDERDISPOUSERTYPE_ED_A_ED
Medical/PA/NP Students Attestation (For Medical/PA/NP Student USE Only)... Attending Attestation (For Attendings USE Only).../Medical/PA/NP Students Attestation (For Medical/PA/NP Student USE Only)... Attending Attestation (For Attendings USE Only).../Scribe Attestation (For Scribes USE Only).../Medical/PA/NP Students Attestation (For Medical/PA/NP Student USE Only)...

## 2021-10-10 NOTE — ED ADULT TRIAGE NOTE - CHIEF COMPLAINT QUOTE
Pt comes to ED with c/o chest pain that started this morning. Pt took 243 ASA, valsartan and carvedilol which helped relieve symptoms. Pt had 2x stents placed at [facility] a few weeks ago for HTN. Pt cardiologist is Dr. Jacinto. Denies SOB, fever.

## 2021-10-10 NOTE — ED ADULT NURSE NOTE - NSIMPLEMENTINTERV_GEN_ALL_ED
Implemented All Fall with Harm Risk Interventions:  [place] to call system. Call bell, personal items and telephone within reach. Instruct patient to call for assistance. Room bathroom lighting operational. Non-slip footwear when patient is off stretcher. Physically safe environment: no spills, clutter or unnecessary equipment. Stretcher in lowest position, wheels locked, appropriate side rails in place. Provide visual cue, wrist band, yellow gown, etc. Monitor gait and stability. Monitor for mental status changes and reorient to person, place, and time. Review medications for side effects contributing to fall risk. Reinforce activity limits and safety measures with patient and family. Provide visual clues: red socks.

## 2021-10-10 NOTE — ED PROVIDER NOTE - NSICDXPASTSURGICALHX_GEN_ALL_CORE_FT
PAST SURGICAL HISTORY:  History of breast biopsy     History of foot surgery     History of hand surgery     History of lung biopsy     History of surgery on arm     S/P hip replacement, left

## 2021-10-10 NOTE — ED ADULT NURSE NOTE - NSICDXFAMILYHX_GEN_ALL_CORE_FT
FAMILY HISTORY:  Father  Still living? No  Family history of cerebrovascular accident (CVA), Age at diagnosis: 61-70  Family history of myocardial infarction, Age at diagnosis: 61-70    Mother  Still living? No  Family history of schizophrenia, Age at diagnosis: Age Unknown    Grandparent  Still living? No  Family history of cardiac disorder, Age at diagnosis: Age Unknown

## 2021-10-10 NOTE — ED PROVIDER NOTE - OBJECTIVE STATEMENT
76 year old female with pmh HTN, CAD s/p stents 2 weeks ago at [facility] presents from shelter with sudden onset 8/10 midternal chest pain radiating to the right shoulder relieved with aspirin. Pain now 3/10. Pt complaining of palpitations and shortness of breath. Pt took clopidogrel and 81mg aspirin x3 at shelter home. Pt denies N/V/D. 76 year old female with pmh HTN, CAD s/p stents 2 weeks ago at [facility] presents from shelter with sudden onset 8/10 midsternal chest pain radiating to the right shoulder relieved with aspirin. Pain now 3/10. Pt complaining of palpitations and shortness of breath. Pt took clopidogrel and 81mg aspirin x3 at shelter home. Pt denies N/V/D. Denies headache, dizziness, visual changes or LOC.

## 2021-10-11 LAB
APTT BLD: 36.7 SEC — HIGH (ref 27.5–35.5)
APTT BLD: >200 SEC — CRITICAL HIGH (ref 27.5–35.5)
COVID-19 SPIKE DOMAIN AB INTERP: POSITIVE
COVID-19 SPIKE DOMAIN ANTIBODY RESULT: >250 U/ML — HIGH
CRP SERPL-MCNC: <3 MG/L — SIGNIFICANT CHANGE UP
HCT VFR BLD CALC: 30.9 % — LOW (ref 34.5–45)
HGB BLD-MCNC: 9.7 G/DL — LOW (ref 11.5–15.5)
MCHC RBC-ENTMCNC: 28 PG — SIGNIFICANT CHANGE UP (ref 27–34)
MCHC RBC-ENTMCNC: 31.4 GM/DL — LOW (ref 32–36)
MCV RBC AUTO: 89 FL — SIGNIFICANT CHANGE UP (ref 80–100)
PLATELET # BLD AUTO: 177 K/UL — SIGNIFICANT CHANGE UP (ref 150–400)
RBC # BLD: 3.47 M/UL — LOW (ref 3.8–5.2)
RBC # FLD: 13.8 % — SIGNIFICANT CHANGE UP (ref 10.3–14.5)
SARS-COV-2 IGG+IGM SERPL QL IA: >250 U/ML — HIGH
SARS-COV-2 IGG+IGM SERPL QL IA: POSITIVE
TROPONIN I, HIGH SENSITIVITY RESULT: 1647 NG/L — HIGH
WBC # BLD: 4.03 K/UL — SIGNIFICANT CHANGE UP (ref 3.8–10.5)
WBC # FLD AUTO: 4.03 K/UL — SIGNIFICANT CHANGE UP (ref 3.8–10.5)

## 2021-10-11 PROCEDURE — 93306 TTE W/DOPPLER COMPLETE: CPT | Mod: 26

## 2021-10-11 PROCEDURE — 99233 SBSQ HOSP IP/OBS HIGH 50: CPT

## 2021-10-11 PROCEDURE — 93010 ELECTROCARDIOGRAM REPORT: CPT

## 2021-10-11 RX ORDER — VALSARTAN 80 MG/1
80 TABLET ORAL ONCE
Refills: 0 | Status: COMPLETED | OUTPATIENT
Start: 2021-10-11 | End: 2021-10-11

## 2021-10-11 RX ORDER — VALSARTAN 80 MG/1
160 TABLET ORAL DAILY
Refills: 0 | Status: DISCONTINUED | OUTPATIENT
Start: 2021-10-11 | End: 2021-10-12

## 2021-10-11 RX ORDER — HEPARIN SODIUM 5000 [USP'U]/ML
5000 INJECTION INTRAVENOUS; SUBCUTANEOUS EVERY 8 HOURS
Refills: 0 | Status: DISCONTINUED | OUTPATIENT
Start: 2021-10-11 | End: 2021-10-12

## 2021-10-11 RX ADMIN — Medication 81 MILLIGRAM(S): at 09:30

## 2021-10-11 RX ADMIN — HEPARIN SODIUM 0 UNIT(S)/HR: 5000 INJECTION INTRAVENOUS; SUBCUTANEOUS at 06:17

## 2021-10-11 RX ADMIN — HEPARIN SODIUM 5000 UNIT(S): 5000 INJECTION INTRAVENOUS; SUBCUTANEOUS at 21:24

## 2021-10-11 RX ADMIN — VALSARTAN 80 MILLIGRAM(S): 80 TABLET ORAL at 09:30

## 2021-10-11 RX ADMIN — VALSARTAN 80 MILLIGRAM(S): 80 TABLET ORAL at 16:09

## 2021-10-11 RX ADMIN — HEPARIN SODIUM 750 UNIT(S)/HR: 5000 INJECTION INTRAVENOUS; SUBCUTANEOUS at 07:22

## 2021-10-11 RX ADMIN — ATORVASTATIN CALCIUM 40 MILLIGRAM(S): 80 TABLET, FILM COATED ORAL at 21:24

## 2021-10-11 RX ADMIN — Medication 25 MILLIGRAM(S): at 09:30

## 2021-10-11 RX ADMIN — CLOPIDOGREL BISULFATE 75 MILLIGRAM(S): 75 TABLET, FILM COATED ORAL at 09:30

## 2021-10-11 RX ADMIN — Medication 3 MILLIGRAM(S): at 21:24

## 2021-10-11 RX ADMIN — Medication 12.5 MILLIGRAM(S): at 09:30

## 2021-10-11 NOTE — CONSULT NOTE ADULT - ASSESSMENT
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

## 2021-10-11 NOTE — PROGRESS NOTE ADULT - SUBJECTIVE AND OBJECTIVE BOX
Reason for Admission: cp  History of Present Illness:   75 yo F with pmh CAD s/p PCI 2 wks ago at [facility], pulmonary sarcoid,  HTN, HLD combined HFpEF/HFrEF now presents with new onset midsternal chest pain that radiates to right shoulder with associated diaphoresis, palpitations. Pain is not made better or worse with any particular factor/movement. Relieved with ASA. Took 3 doses of her aspiring and plavix pta. She denies cough or fevers. PT undomiciled and lives in shelter.   No n/v/d. No HA/dizzy.   TNI negative  CXR: negative  Echo : 35-40% echo from 21 EF 45-50%  LHC 2021: SHIRA to LAD      Medical progress: Denies any HA, CP, SOB. patient notes of poorly controlled Blood pressures at home. We discussed with patient importance of good BP control  Complaints: no new complaints   State of mind: maintains normal conversation  Care discussed with Dr. Sam /  Pharmacy /  nursing    REVIEW OF SYSTEMS:  General: NAD, hemodynamically stable, (-)  fever, (-) chills, (-) weakness  HEENT:  Eyes:  No visual loss, blurred vision, double vision or yellow sclerae. Ears, Nose, Throat:  No hearing loss, sneezing, congestion, runny nose or sore throat.  SKIN:  No rash or itching.  CARDIOVASCULAR:  No chest pain, chest pressure or chest discomfort. No palpitations or edema.  RESPIRATORY:  No shortness of breath, cough or sputum.  GASTROINTESTINAL:  No anorexia, nausea, vomiting or diarrhea. No abdominal pain or blood.  NEUROLOGICAL:  No headache, dizziness, syncope, paralysis, ataxia, numbness or tingling in the extremities. No change in bowel or bladder control.  MUSCULOSKELETAL:  No muscle, back pain, joint pain or stiffness.  HEMATOLOGIC:  No anemia, bleeding or bruising.  LYMPHATICS:  No enlarged nodes. No history of splenectomy.  ENDOCRINOLOGIC:  No reports of sweating, cold or heat intolerance. No polyuria or polydipsia.  ALLERGIES:  No history of asthma, hives, eczema or rhinitis.    Physical Exam:   GENERAL APPEARANCE:  NAD, hemodynamically stable  T(C): 37.7 (10-11-21 @ 15:28), Max: 37.7 (10-11-21 @ 15:28)  HR: 68 (10-11-21 @ 15:28) (58 - 77)  BP: 173/80 (10-11-21 @ 15:28) (143/63 - 173/80)  RR: 18 (10-11-21 @ 15:28) (18 - 18)  SpO2: 97% (10-11-21 @ 15:28) (97% - 100%)  HEENT:  Head is normocephalic    Skin:  Warm and dry without any rash   NECK:  Supple without lymphadenopathy.   HEART:  Regular rate and rhythm. normal S1 and S2, No M/R/G  LUNGS:  Good ins/exp effort, no W/R/R/C  ABDOMEN:  Soft, nontender, nondistended with good bowel sounds heard  EXTREMITIES:  Without cyanosis, clubbing or edema.   NEUROLOGICAL:  Gross nonfocal     Labs:   CBC Full  -  ( 11 Oct 2021 05:08 )  WBC Count : 4.03 K/uL  RBC Count : 3.47 M/uL  Hemoglobin : 9.7 g/dL  Hematocrit : 30.9 %  Platelet Count - Automated : 177 K/uL    PTT - ( 11 Oct 2021 13:23 )  PTT:36.7 sec  Urinalysis Basic - ( 10 Oct 2021 11:41 )    Color: Yellow / Appearance: Clear / S.005 / pH: x  Gluc: x / Ketone: Negative  / Bili: Negative / Urobili: Negative mg/dL   Blood: x / Protein: 15 mg/dL / Nitrite: Negative   Leuk Esterase: Small / RBC: Negative /HPF / WBC 6-10   Sq Epi: x / Non Sq Epi: Few / Bacteria: Few    139  |  106  |  19  ----------------------------<  96  3.9   |  28  |  1.02    Ca    8.9      10 Oct 2021 11:41    TPro  8.1  /  Alb  3.7  /  TBili  0.2  /  DBili  x   /  AST  27  /  ALT  24  /  AlkPhos  66  10-10      # HTN emergency  # Chest pain   # Sinus bradycardia  - DAPT /  STATIN / BB  - Episode of chest discomfort with elevated Troponins  - Moderate disease and patent stent noted  - 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

## 2021-10-11 NOTE — PROVIDER CONTACT NOTE (CRITICAL VALUE NOTIFICATION) - SITUATION
Lab results show increasing troponin from 1277.6 to 1647.0
Lab results show increased troponin of 1277.6
PTT results were greater than 200

## 2021-10-11 NOTE — PROVIDER CONTACT NOTE (CRITICAL VALUE NOTIFICATION) - ASSESSMENT
Pt remains asymptomatic and without signs of bleeding.
Pt is asymptomatic at this time.
Pt remains asymptomatic

## 2021-10-11 NOTE — PROVIDER CONTACT NOTE (CRITICAL VALUE NOTIFICATION) - ACTION/TREATMENT ORDERED:
Dr. Covington aware, heparin nomogram followed to delay for one hour and then start again at 7.5 units/hr.

## 2021-10-11 NOTE — PHARMACOTHERAPY INTERVENTION NOTE - COMMENTS
Medication History complete, reviewed with patient and confirmed with Chase
Recommended increasing valsartan dose from 80 mg QD to 160 mg QD.
As per Cardiology, recommended discontinuing the heparin gtt and adding heparin 5000 units q8h for dvt prophylaxis.

## 2021-10-11 NOTE — CONSULT NOTE ADULT - SUBJECTIVE AND OBJECTIVE BOX
Patient is a 76y old  Female who presents with a chief complaint of cp (10 Oct 2021 14:33)      HPI:  75 yo F with pmh CAD s/p PCI 2 wks ago at [facility], pulmonary sarcoid,  HTN, HLD combined HFpEF/HFrEF now presents with new onset midsternal chest pain that radiates to right shoulder with associated diaphoresis, palpitations. Pain is not made better or worse with any particular factor/movement. Relieved with ASA. Took 3 doses of her aspiring and plavix pta. She denies cough or fevers. PT undomiciled and lives in shelter.   No n/v/d. No HA/dizzy.   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     PAST SURGICAL HISTORY:  History of breast biopsy     History of foot surgery     History of hand surgery     History of lung biopsy     History of surgery on arm     S/P hip replacement, left.     FAMILY HISTORY:  Father  Still living? No  Family history of cerebrovascular accident (CVA), Age at diagnosis: 61-70  Family history of myocardial infarction, Age at diagnosis: 61-70    Mother  Still living? No  Family history of schizophrenia, Age at diagnosis: Age Unknown    Grandparent  Still living? No  Family history of cardiac disorder, Age at diagnosis: Age Unknown.    · Social Concerns: Homeless     Tobacco Usage:  · Tobacco Usage	Never smoker   (10 Oct 2021 14:33)      PAST MEDICAL & SURGICAL HISTORY:  COPD, mild    Brain aneurysm    Chronic arthritis or osteoarthritis    Rheumatoid arthritis    Primary hypertension    Pulmonary sarcoidosis    S/P hip replacement, left    History of surgery on arm    History of hand surgery    History of breast biopsy    History of lung biopsy    History of foot surgery        HPI:                PREVIOUS DIAGNOSTIC TESTING:      ECHO  FINDINGS:    STRESS  FINDINGS:    CATHETERIZATION  FINDINGS:    MEDICATIONS  (STANDING):  aspirin  chewable 81 milliGRAM(s) Oral daily  atorvastatin 40 milliGRAM(s) Oral at bedtime  clopidogrel Tablet 75 milliGRAM(s) Oral daily  heparin  Infusion.  Unit(s)/Hr (10 mL/Hr) IV Continuous <Continuous>  hydrochlorothiazide 12.5 milliGRAM(s) Oral daily  metoprolol succinate ER 25 milliGRAM(s) Oral daily  valsartan 80 milliGRAM(s) Oral daily    MEDICATIONS  (PRN):  acetaminophen   Tablet .. 650 milliGRAM(s) Oral every 6 hours PRN Mild Pain (1 - 3)  aluminum hydroxide/magnesium hydroxide/simethicone Suspension 30 milliLiter(s) Oral every 4 hours PRN Dyspepsia  heparin   Injectable 5000 Unit(s) IV Push every 6 hours PRN For aPTT less than 40  melatonin 3 milliGRAM(s) Oral at bedtime PRN Insomnia  nitroglycerin     SubLingual 0.4 milliGRAM(s) SubLingual every 5 minutes PRN Chest Pain  ondansetron Injectable 4 milliGRAM(s) IV Push every 8 hours PRN Nausea and/or Vomiting      FAMILY HISTORY:  Family history of myocardial infarction (Father)    Family history of cerebrovascular accident (CVA) (Father)    Family history of schizophrenia (Mother)    Family history of cardiac disorder (Grandparent)        SOCIAL HISTORY:    CIGARETTES:            Vital Signs Last 24 Hrs  T(C): 36.4 (11 Oct 2021 08:28), Max: 37 (10 Oct 2021 18:34)  T(F): 97.6 (11 Oct 2021 08:28), Max: 98.6 (10 Oct 2021 18:34)  HR: 69 (11 Oct 2021 09:30) (58 - 131)  BP: 165/99 (11 Oct 2021 09:30) (143/63 - 170/90)  BP(mean): --  RR: 18 (11 Oct 2021 08:28) (17 - 19)  SpO2: 99% (11 Oct 2021 08:28) (98% - 100%)    PHYSICAL EXAM-    Constitutional: The patient appears to be normal, well developed, well nourished and alert and oriented to time, place and person. The patient does not appear acutely ill. The patient is alert.     Head: Head is normocephalic and atraumatic.      Neck: The patient's neck is supple without enlargement, has no palpable thyromegaly nor thyroid nodules and has no jugular venous distention. No audible carotid bruits. There are strong carotid pulses bilaterally. No JVD.     Cardiovascular: Regular rate and rhythm without S3, S4. No murmurs or rubs are appreciated.      Respiratory: The patient has no rales and no rhonchi. The patient has no wheezes.     Abdomen: Soft, nontender, nondistended with positive bowel sounds.      Extremity: No tenderness. There is no pitting edema, skin discoloration, clubbing and cyanosis.       INTERPRETATION OF TELEMETRY:    ECG:    I&O's Detail      LABS:                        9.7    4.03  )-----------( 177      ( 11 Oct 2021 05:08 )             30.9     10-10    139  |  106  |  19  ----------------------------<  96  3.9   |  28  |  1.02    Ca    8.9      10 Oct 2021 11:41    TPro  8.1  /  Alb  3.7  /  TBili  0.2  /  DBili  x   /  AST  27  /  ALT  24  /  AlkPhos  66  10-10        PTT - ( 11 Oct 2021 05:08 )  PTT:>200.0 sec  Urinalysis Basic - ( 10 Oct 2021 11:41 )    Color: Yellow / Appearance: Clear / S.005 / pH: x  Gluc: x / Ketone: Negative  / Bili: Negative / Urobili: Negative mg/dL   Blood: x / Protein: 15 mg/dL / Nitrite: Negative   Leuk Esterase: Small / RBC: Negative /HPF / WBC 6-10   Sq Epi: x / Non Sq Epi: Few / Bacteria: Few      I&O's Summary    BNPSerum Pro-Brain Natriuretic Peptide: 727 pg/mL (10-10 @ 11:41)    RADIOLOGY & ADDITIONAL STUDIES:

## 2021-10-11 NOTE — PROVIDER CONTACT NOTE (CRITICAL VALUE NOTIFICATION) - BACKGROUND
Pt started on a heparin drip overnight for increased troponin levels.
Pt comes in with chest pressure, previous troponin from this afternoon was 31.6

## 2021-10-12 ENCOUNTER — TRANSCRIPTION ENCOUNTER (OUTPATIENT)
Age: 76
End: 2021-10-12

## 2021-10-12 VITALS
DIASTOLIC BLOOD PRESSURE: 69 MMHG | OXYGEN SATURATION: 95 % | HEART RATE: 60 BPM | TEMPERATURE: 99 F | RESPIRATION RATE: 18 BRPM | SYSTOLIC BLOOD PRESSURE: 149 MMHG

## 2021-10-12 LAB
HCT VFR BLD CALC: 32.6 % — LOW (ref 34.5–45)
HGB BLD-MCNC: 10.1 G/DL — LOW (ref 11.5–15.5)
MCHC RBC-ENTMCNC: 27.5 PG — SIGNIFICANT CHANGE UP (ref 27–34)
MCHC RBC-ENTMCNC: 31 GM/DL — LOW (ref 32–36)
MCV RBC AUTO: 88.8 FL — SIGNIFICANT CHANGE UP (ref 80–100)
PLATELET # BLD AUTO: 189 K/UL — SIGNIFICANT CHANGE UP (ref 150–400)
RBC # BLD: 3.67 M/UL — LOW (ref 3.8–5.2)
RBC # FLD: 13.6 % — SIGNIFICANT CHANGE UP (ref 10.3–14.5)
WBC # BLD: 3.9 K/UL — SIGNIFICANT CHANGE UP (ref 3.8–10.5)
WBC # FLD AUTO: 3.9 K/UL — SIGNIFICANT CHANGE UP (ref 3.8–10.5)

## 2021-10-12 PROCEDURE — 99238 HOSP IP/OBS DSCHRG MGMT 30/<: CPT

## 2021-10-12 RX ORDER — BNT162B2 0.23 MG/2.25ML
0.3 INJECTION, SUSPENSION INTRAMUSCULAR
Qty: 0 | Refills: 0 | DISCHARGE

## 2021-10-12 RX ORDER — HYDROCHLOROTHIAZIDE 25 MG
1 TABLET ORAL
Qty: 30 | Refills: 0
Start: 2021-10-12 | End: 2021-11-10

## 2021-10-12 RX ORDER — VALSARTAN 80 MG/1
1 TABLET ORAL
Qty: 30 | Refills: 0
Start: 2021-10-12 | End: 2021-11-10

## 2021-10-12 RX ADMIN — VALSARTAN 160 MILLIGRAM(S): 80 TABLET ORAL at 13:53

## 2021-10-12 RX ADMIN — Medication 25 MILLIGRAM(S): at 13:56

## 2021-10-12 RX ADMIN — Medication 81 MILLIGRAM(S): at 13:53

## 2021-10-12 RX ADMIN — CLOPIDOGREL BISULFATE 75 MILLIGRAM(S): 75 TABLET, FILM COATED ORAL at 13:53

## 2021-10-12 RX ADMIN — HEPARIN SODIUM 5000 UNIT(S): 5000 INJECTION INTRAVENOUS; SUBCUTANEOUS at 13:56

## 2021-10-12 RX ADMIN — HEPARIN SODIUM 5000 UNIT(S): 5000 INJECTION INTRAVENOUS; SUBCUTANEOUS at 05:06

## 2021-10-12 RX ADMIN — Medication 12.5 MILLIGRAM(S): at 13:53

## 2021-10-12 NOTE — DISCHARGE NOTE NURSING/CASE MANAGEMENT/SOCIAL WORK - PATIENT PORTAL LINK FT
You can access the FollowMyHealth Patient Portal offered by [facility] by registering at the following website: [email or web address]. By joining [employer]’s FollowMyHealth portal, you will also be able to view your health information using other applications (apps) compatible with our system.

## 2021-10-12 NOTE — DISCHARGE NOTE PROVIDER - NSDCCPCAREPLAN_GEN_ALL_CORE_FT
PRINCIPAL DISCHARGE DIAGNOSIS  Diagnosis: Chest pain  Assessment and Plan of Treatment: - ASA / Plavix / STATIN  - Aggressive medical management of coronary artery disease and its   underlying risk factors.   - Manage with medical therapy.      SECONDARY DISCHARGE DIAGNOSES  Diagnosis: HTN, goal below 140/80  Assessment and Plan of Treatment: - continue with po meds     PRINCIPAL DISCHARGE DIAGNOSIS  Diagnosis: Chest pain  Assessment and Plan of Treatment: - ASA / Plavix / STATIN  - Aggressive medical management of coronary artery disease and its   underlying risk factors.   - Manage with medical therapy.      SECONDARY DISCHARGE DIAGNOSES  Diagnosis: HTN, goal below 140/80  Assessment and Plan of Treatment: - continue with po meds  - please maintain good comlpience with medications    Diagnosis: Anemia  Assessment and Plan of Treatment: - you need a close follow up with hematology - for thorough workup for your anemia

## 2021-10-12 NOTE — DISCHARGE NOTE PROVIDER - CARE PROVIDER_API CALL
Anton Sam (MD)  Cardiovascular Disease; Interventional Cardiology  [address] [place] [address]  Phone: [phone]  Fax: [phone]  Follow Up Time:

## 2021-10-12 NOTE — DISCHARGE NOTE PROVIDER - NSDCMRMEDTOKEN_GEN_ALL_CORE_FT
aspirin 81 mg oral tablet, chewable: 1 tab(s) orally once a day   atorvastatin 40 mg oral tablet: 1 tab(s) orally once a day (at bedtime)  clopidogrel 75 mg oral tablet: 1 tab(s) orally once a day  hydroCHLOROthiazide 12.5 mg oral capsule: 1 cap(s) orally once a day  metoprolol succinate 25 mg oral tablet, extended release: 1 tab(s) orally once a day  Multiple Vitamins oral tablet: 1 tab(s) orally once a day  valsartan 160 mg oral tablet: 1 tab(s) orally once a day

## 2021-10-12 NOTE — DISCHARGE NOTE PROVIDER - NSDCCPTREATMENT_GEN_ALL_CORE_FT
PRINCIPAL PROCEDURE  Procedure: 2D echo  Findings and Treatment: Mild to Moderate mitral regurgitation is present.   Minimally reduced left ventricular systolic function. Normal LV size &   systolic function.   Estimated left ventricular ejection fraction is 50 %.   Mild (1+) tricuspid valve regurgitation. Normal PA systolic pressures.

## 2021-10-12 NOTE — DISCHARGE NOTE PROVIDER - HOSPITAL COURSE
Reason for Admission: cp  History of Present Illness:   77 yo F with pmh CAD s/p PCI 2 wks ago at [facility], pulmonary sarcoid,  HTN, HLD combined HFpEF/HFrEF now presents with new onset midsternal chest pain that radiates to right shoulder with associated diaphoresis, palpitations. Pain is not made better or worse with any particular factor/movement. Relieved with ASA. Took 3 doses of her aspiring and plavix pta. She denies cough or fevers. PT undomiciled and lives in shelter.   No n/v/d. No HA/dizzy.   TNI negative  CXR: negative  Echo 9/21: 35-40% echo from 8/20/21 EF 45-50%  LHC 9/2021: SHIRA to LAD    Medical progress: Denies any HA, CP, SOB. patient notes of poorly controlled Blood pressures at home.   Complaints: no new complaints   State of mind: maintains normal conversation  Care discussed with Dr. Sam /  Pharmacy /  nursing    Physical Exam:   GENERAL APPEARANCE:  NAD, hemodynamically stable  T(C): 37 (10-12-21 @ 08:23), Max: 37.7 (10-11-21 @ 15:28)  HR: 60 (10-12-21 @ 08:23) (60 - 68)  BP: 149/69 (10-12-21 @ 08:23) (140/73 - 173/80)  RR: 18 (10-12-21 @ 08:23) (18 - 18)  SpO2: 95% (10-12-21 @ 08:23) (95% - 99%)  HEENT: normocephalic    Skin:  thin/ dry  NECK:  Supple without lymphadenopathy.   HEART:  Regular rate and rhythm. normal S1 and S2  LUNGS: no W/R/R/C  ABDOMEN:  Soft, nontender, nondistended with good bowel sounds heard  EXTREMITIES:  Without cyanosis, clubbing or edema.   NEUROLOGICAL:  Gross nonfocal   Reason for Admission: cp  History of Present Illness:   77 yo F with pmh CAD s/p PCI 2 wks ago at [facility], pulmonary sarcoid,  HTN, HLD combined HFpEF/HFrEF now presents with new onset midsternal chest pain that radiates to right shoulder with associated diaphoresis, palpitations. Pain is not made better or worse with any particular factor/movement. Relieved with ASA. Took 3 doses of her aspiring and plavix pta. She denies cough or fevers. PT undomiciled and lives in shelter.   No n/v/d. No HA/dizzy.   TNI negative  CXR: negative  Echo 9/21: 35-40% echo from 8/20/21 EF 45-50%  LHC 9/2021: SHIRA to LAD    Medical progress: Denies any HA, CP, SOB. patient notes of poorly controlled Blood pressures at home. We discussed the importance of good blood pressure control.   Complaints: no new complaints   State of mind: maintains normal conversation  Care discussed with Dr. Sam /  Pharmacy /  nursing    Physical Exam:   GENERAL APPEARANCE:  NAD, hemodynamically stable  T(C): 37 (10-12-21 @ 08:23), Max: 37.7 (10-11-21 @ 15:28)  HR: 60 (10-12-21 @ 08:23) (60 - 68)  BP: 149/69 (10-12-21 @ 08:23) (140/73 - 173/80)  RR: 18 (10-12-21 @ 08:23) (18 - 18)  SpO2: 95% (10-12-21 @ 08:23) (95% - 99%)  HEENT: normocephalic    Skin:  thin/ dry  NECK:  Supple without lymphadenopathy.   HEART:  Regular rate and rhythm. normal S1 and S2  LUNGS: no W/R/R/C  ABDOMEN:  Soft, nontender, nondistended with good bowel sounds heard  EXTREMITIES:  Without cyanosis, clubbing or edema.   NEUROLOGICAL:  Gross nonfocal

## 2021-10-13 ENCOUNTER — APPOINTMENT (OUTPATIENT)
Dept: COLORECTAL SURGERY | Facility: CLINIC | Age: 76
End: 2021-10-13
Payer: MEDICARE

## 2021-10-13 VITALS
WEIGHT: 184 LBS | TEMPERATURE: 97.8 F | DIASTOLIC BLOOD PRESSURE: 77 MMHG | SYSTOLIC BLOOD PRESSURE: 133 MMHG | HEIGHT: 68 IN | BODY MASS INDEX: 27.89 KG/M2 | HEART RATE: 101 BPM | RESPIRATION RATE: 14 BRPM | OXYGEN SATURATION: 100 %

## 2021-10-13 DIAGNOSIS — Z12.11 ENCOUNTER FOR SCREENING FOR MALIGNANT NEOPLASM OF COLON: ICD-10-CM

## 2021-10-13 PROCEDURE — 99204 OFFICE O/P NEW MOD 45 MIN: CPT

## 2021-10-13 NOTE — HISTORY OF PRESENT ILLNESS
[FreeTextEntry1] : Ms. Branham presents to the office for discussion of colonoscopy given findings of anemia on bloodwork, Hgb 10.8. She recently underwent at end of Sept 2021, cardiac cath with stents and is now on Plavix and ASA. Last colonoscopy 2019 with Dr. Dahl. She denies any abdominal pain, blood in her stool, recent change in bowel habits or progressive constipation. No FH of colon or rectal cancer.

## 2021-10-13 NOTE — ASSESSMENT
[FreeTextEntry1] : Ms Branham  presents to the office for discussion of a screening colonoscopy given recent findings on routine labwork of Hgb 10.8. Most recent colonoscopy was 2 years earlier and found to be WNL. Given relatively recent negative scope and need for continuous AC x 6months for new cardiac stents, recommend postponing colonoscopy until 4/2021 when plavix can be at least held for 5 days in advance. \par The risks/benefits/alternatives for a colonoscopy were discussed. These include a less than 1% risk of bleeding should any polyps be biopsied and/or removed. There is also a less than 0.1% risk of perforation. The patient understands the need to adhere to a clear liquid diet the day prior to procedure as well as having to perform a bowel prep in order to allow for adequate visualization of the mucosal surfaces.  Followup colonoscopies will be scheduled based on the findings that are seen at the time of the procedure. Patient understands and is agreeable, and will proceed with consent and scheduling.\par

## 2021-10-13 NOTE — PHYSICAL EXAM
[No Rash or Lesion] : No rash or lesion [Oriented to Person] : oriented to person [Oriented to Place] : oriented to place [Oriented to Time] : oriented to time [Calm] : calm [de-identified] : No apparent distress [de-identified] : Normocephalic atraumatic [de-identified] : Moving all extremities x4

## 2021-10-18 DIAGNOSIS — I16.1 HYPERTENSIVE EMERGENCY: ICD-10-CM

## 2021-10-18 DIAGNOSIS — Q24.5 MALFORMATION OF CORONARY VESSELS: ICD-10-CM

## 2021-10-18 DIAGNOSIS — J44.9 CHRONIC OBSTRUCTIVE PULMONARY DISEASE, UNSPECIFIED: ICD-10-CM

## 2021-10-18 DIAGNOSIS — I50.42 CHRONIC COMBINED SYSTOLIC (CONGESTIVE) AND DIASTOLIC (CONGESTIVE) HEART FAILURE: ICD-10-CM

## 2021-10-18 DIAGNOSIS — I34.0 NONRHEUMATIC MITRAL (VALVE) INSUFFICIENCY: ICD-10-CM

## 2021-10-18 DIAGNOSIS — Z96.642 PRESENCE OF LEFT ARTIFICIAL HIP JOINT: ICD-10-CM

## 2021-10-18 DIAGNOSIS — Z95.5 PRESENCE OF CORONARY ANGIOPLASTY IMPLANT AND GRAFT: ICD-10-CM

## 2021-10-18 DIAGNOSIS — Z79.02 LONG TERM (CURRENT) USE OF ANTITHROMBOTICS/ANTIPLATELETS: ICD-10-CM

## 2021-10-18 DIAGNOSIS — D86.9 SARCOIDOSIS, UNSPECIFIED: ICD-10-CM

## 2021-10-18 DIAGNOSIS — M06.9 RHEUMATOID ARTHRITIS, UNSPECIFIED: ICD-10-CM

## 2021-10-18 DIAGNOSIS — I11.0 HYPERTENSIVE HEART DISEASE WITH HEART FAILURE: ICD-10-CM

## 2021-10-18 DIAGNOSIS — I25.10 ATHEROSCLEROTIC HEART DISEASE OF NATIVE CORONARY ARTERY WITHOUT ANGINA PECTORIS: ICD-10-CM

## 2021-10-18 DIAGNOSIS — R00.1 BRADYCARDIA, UNSPECIFIED: ICD-10-CM

## 2021-10-18 DIAGNOSIS — D64.9 ANEMIA, UNSPECIFIED: ICD-10-CM

## 2021-10-18 DIAGNOSIS — Z79.82 LONG TERM (CURRENT) USE OF ASPIRIN: ICD-10-CM

## 2021-10-18 DIAGNOSIS — M19.90 UNSPECIFIED OSTEOARTHRITIS, UNSPECIFIED SITE: ICD-10-CM

## 2021-10-18 DIAGNOSIS — R07.9 CHEST PAIN, UNSPECIFIED: ICD-10-CM

## 2021-10-18 DIAGNOSIS — Z59.00 HOMELESSNESS UNSPECIFIED: ICD-10-CM

## 2021-10-18 SDOH — ECONOMIC STABILITY - HOUSING INSECURITY: HOMELESSNESS UNSPECIFIED: Z59.00

## 2021-10-18 NOTE — ED PROVIDER NOTE - NSTIMEPROVIDERCAREINITIATE_GEN_ER
SPIRITUAL HEALTH SERVICES Progress Note   RH Advance Directive Education    Responded to a consult order for Advance Care Planning (ACP) education for Henry hKanna.  ACP education and materials provided.  Henry reported that he will likely name his spouse as his primary healthcare agent.  He endorsed completing a healthcare directive in conversation with his spouse.    Briefly oriented Henry to [facility].      This author and other chaplains remain available per pt's request.    Pedro Loredo M.Div., [facility]  Staff   Phone [phone]  
29-Jul-2020 06:27

## 2021-12-01 PROCEDURE — G9005: CPT

## 2022-03-31 NOTE — ED ADULT NURSE NOTE - COVID-19  TEST TYPE
Medication(s) sertraline refill refused due to DUPLICATE   Recently filled on 3/7/2022; same pharmacy, receipt confirmed.  Dispense amount:  90, Refill: 1,  6 month supply.  Refills or Rx should be available at preferred pharmacy.   MOLECULAR PCR

## 2022-11-08 NOTE — CONSULT NOTE ADULT - PROVIDER SPECIALTY LIST ADULT
30 y o   female at 36w7d (Estimated Date of Delivery: 22) for PNV  Pre-Caroline Vitals    Flowsheet Row Most Recent Value   Prenatal Assessment    Fetal Heart Rate 140   Movement Present   Prenatal Vitals    Blood Pressure 118/64   Weight - Scale 87 5 kg (193 lb)   Urine Albumin/Glucose    Dilation/Effacement/Station    Vaginal Drainage    Edema    LLE Edema None   RLE Edema None   Facial Edema None         kg (13 lb)  Denies lof or bleeding  Irregular contractions  Good FM  Declines cervical check today  considering TOLAC, vs RLTCS   RTO in 1 week 
Neurology
Neurosurgery

## 2023-04-15 NOTE — H&P ADULT - ASSESSMENT
#HTN emergency  #Chest pain   #sinus tachycardia  -admit to tele  -rec/d total 10 IVP labetolol  -Recent cath 2 weeks ago  -obtain 2D echo to r/o e/o effusion  -no e/o salas-myocarditis on ekg  -new TWI in anterior leads  -ESR/CRP/serial trops  -cardiology consult  -doubt PE, obtain d-dimer  -cont dapt, statin  -ntg prn  -cardiology consult    DVT px: SCD  IMPROVE VTE Individual Risk Assessment    RISK                                                                Points    [  ] Previous VTE                                                  3    [  ] Thrombophilia                                               2    [  ] Lower limb paralysis                                      2        (unable to hold up >15 seconds)      [  ] Current Cancer                                              2         (within 6 months)    [  ] Immobilization > 24 hrs                                1    [  ] ICU/CCU stay > 24 hours                              1    [ x ] Age > 60                                                      1    IMPROVE VTE Score __1_______ low risk-> SCDs    IMPROVE Score 0-1: Low Risk, No VTE prophylaxis required for most patients, encourage ambulation.   IMPROVE Score 2-3: At risk, pharmacologic VTE prophylaxis is indicated for most patients (in the absence of a contraindication)  IMPROVE Score > or = 4: High Risk, pharmacologic VTE prophylaxis is indicated for most patients (in the absence of a contraindication)      Full code   GOC time: 19 min Yes

## 2023-08-20 ENCOUNTER — EMERGENCY (EMERGENCY)
Facility: HOSPITAL | Age: 78
LOS: 1 days | Discharge: HOME | End: 2023-08-20
Attending: EMERGENCY MEDICINE
Payer: MEDICARE

## 2023-08-20 VITALS
OXYGEN SATURATION: 98 % | HEIGHT: 68 IN | TEMPERATURE: 98 F | SYSTOLIC BLOOD PRESSURE: 189 MMHG | WEIGHT: 171.96 LBS | HEART RATE: 87 BPM | RESPIRATION RATE: 18 BRPM | DIASTOLIC BLOOD PRESSURE: 93 MMHG

## 2023-08-20 VITALS
OXYGEN SATURATION: 99 % | RESPIRATION RATE: 18 BRPM | DIASTOLIC BLOOD PRESSURE: 62 MMHG | SYSTOLIC BLOOD PRESSURE: 128 MMHG | HEART RATE: 63 BPM

## 2023-08-20 DIAGNOSIS — I25.10 ATHEROSCLEROTIC HEART DISEASE OF NATIVE CORONARY ARTERY WITHOUT ANGINA PECTORIS: ICD-10-CM

## 2023-08-20 DIAGNOSIS — Z96.642 PRESENCE OF LEFT ARTIFICIAL HIP JOINT: Chronic | ICD-10-CM

## 2023-08-20 DIAGNOSIS — Z98.890 OTHER SPECIFIED POSTPROCEDURAL STATES: Chronic | ICD-10-CM

## 2023-08-20 LAB
ALBUMIN SERPL ELPH-MCNC: 4 G/DL — SIGNIFICANT CHANGE UP (ref 3.3–5.2)
ALP SERPL-CCNC: 63 U/L — SIGNIFICANT CHANGE UP (ref 40–120)
ALT FLD-CCNC: 17 U/L — SIGNIFICANT CHANGE UP
ANION GAP SERPL CALC-SCNC: 11 MMOL/L — SIGNIFICANT CHANGE UP (ref 5–17)
AST SERPL-CCNC: 30 U/L — SIGNIFICANT CHANGE UP
BASOPHILS # BLD AUTO: 0.01 K/UL — SIGNIFICANT CHANGE UP (ref 0–0.2)
BASOPHILS NFR BLD AUTO: 0.3 % — SIGNIFICANT CHANGE UP (ref 0–2)
BILIRUB SERPL-MCNC: 0.3 MG/DL — LOW (ref 0.4–2)
BUN SERPL-MCNC: 13.8 MG/DL — SIGNIFICANT CHANGE UP (ref 8–20)
CALCIUM SERPL-MCNC: 9 MG/DL — SIGNIFICANT CHANGE UP (ref 8.4–10.5)
CHLORIDE SERPL-SCNC: 103 MMOL/L — SIGNIFICANT CHANGE UP (ref 96–108)
CO2 SERPL-SCNC: 26 MMOL/L — SIGNIFICANT CHANGE UP (ref 22–29)
CREAT SERPL-MCNC: 1.07 MG/DL — SIGNIFICANT CHANGE UP (ref 0.5–1.3)
EGFR: 54 ML/MIN/1.73M2 — LOW
EOSINOPHIL # BLD AUTO: 0.03 K/UL — SIGNIFICANT CHANGE UP (ref 0–0.5)
EOSINOPHIL NFR BLD AUTO: 1 % — SIGNIFICANT CHANGE UP (ref 0–6)
GLUCOSE SERPL-MCNC: 93 MG/DL — SIGNIFICANT CHANGE UP (ref 70–99)
HCT VFR BLD CALC: 30.4 % — LOW (ref 34.5–45)
HGB BLD-MCNC: 10.1 G/DL — LOW (ref 11.5–15.5)
IMM GRANULOCYTES NFR BLD AUTO: 0 % — SIGNIFICANT CHANGE UP (ref 0–0.9)
LYMPHOCYTES # BLD AUTO: 1.17 K/UL — SIGNIFICANT CHANGE UP (ref 1–3.3)
LYMPHOCYTES # BLD AUTO: 37.1 % — SIGNIFICANT CHANGE UP (ref 13–44)
MCHC RBC-ENTMCNC: 29.2 PG — SIGNIFICANT CHANGE UP (ref 27–34)
MCHC RBC-ENTMCNC: 33.2 GM/DL — SIGNIFICANT CHANGE UP (ref 32–36)
MCV RBC AUTO: 87.9 FL — SIGNIFICANT CHANGE UP (ref 80–100)
MONOCYTES # BLD AUTO: 0.29 K/UL — SIGNIFICANT CHANGE UP (ref 0–0.9)
MONOCYTES NFR BLD AUTO: 9.2 % — SIGNIFICANT CHANGE UP (ref 2–14)
NEUTROPHILS # BLD AUTO: 1.65 K/UL — LOW (ref 1.8–7.4)
NEUTROPHILS NFR BLD AUTO: 52.4 % — SIGNIFICANT CHANGE UP (ref 43–77)
NT-PROBNP SERPL-SCNC: 416 PG/ML — HIGH (ref 0–300)
PLATELET # BLD AUTO: 149 K/UL — LOW (ref 150–400)
POTASSIUM SERPL-MCNC: 3.8 MMOL/L — SIGNIFICANT CHANGE UP (ref 3.5–5.3)
POTASSIUM SERPL-SCNC: 3.8 MMOL/L — SIGNIFICANT CHANGE UP (ref 3.5–5.3)
PROT SERPL-MCNC: 7.2 G/DL — SIGNIFICANT CHANGE UP (ref 6.6–8.7)
RBC # BLD: 3.46 M/UL — LOW (ref 3.8–5.2)
RBC # FLD: 13 % — SIGNIFICANT CHANGE UP (ref 10.3–14.5)
SODIUM SERPL-SCNC: 139 MMOL/L — SIGNIFICANT CHANGE UP (ref 135–145)
TROPONIN T SERPL-MCNC: <0.01 NG/ML — SIGNIFICANT CHANGE UP (ref 0–0.06)
TROPONIN T SERPL-MCNC: <0.01 NG/ML — SIGNIFICANT CHANGE UP (ref 0–0.06)
WBC # BLD: 3.15 K/UL — LOW (ref 3.8–10.5)
WBC # FLD AUTO: 3.15 K/UL — LOW (ref 3.8–10.5)

## 2023-08-20 PROCEDURE — 85025 COMPLETE CBC W/AUTO DIFF WBC: CPT

## 2023-08-20 PROCEDURE — 99285 EMERGENCY DEPT VISIT HI MDM: CPT

## 2023-08-20 PROCEDURE — 99285 EMERGENCY DEPT VISIT HI MDM: CPT | Mod: 25

## 2023-08-20 PROCEDURE — 84484 ASSAY OF TROPONIN QUANT: CPT

## 2023-08-20 PROCEDURE — 82962 GLUCOSE BLOOD TEST: CPT

## 2023-08-20 PROCEDURE — 80053 COMPREHEN METABOLIC PANEL: CPT

## 2023-08-20 PROCEDURE — 71045 X-RAY EXAM CHEST 1 VIEW: CPT | Mod: 26

## 2023-08-20 PROCEDURE — 71045 X-RAY EXAM CHEST 1 VIEW: CPT

## 2023-08-20 PROCEDURE — 93005 ELECTROCARDIOGRAM TRACING: CPT

## 2023-08-20 PROCEDURE — 83880 ASSAY OF NATRIURETIC PEPTIDE: CPT

## 2023-08-20 PROCEDURE — 36415 COLL VENOUS BLD VENIPUNCTURE: CPT

## 2023-08-20 PROCEDURE — 93010 ELECTROCARDIOGRAM REPORT: CPT

## 2023-08-20 RX ORDER — ASPIRIN/CALCIUM CARB/MAGNESIUM 324 MG
162 TABLET ORAL ONCE
Refills: 0 | Status: COMPLETED | OUTPATIENT
Start: 2023-08-20 | End: 2023-08-20

## 2023-08-20 RX ADMIN — Medication 162 MILLIGRAM(S): at 13:14

## 2023-08-20 NOTE — ED ADULT NURSE REASSESSMENT NOTE - NS ED NURSE REASSESS COMMENT FT1
pt remains resting in stretcher, denies any new onset of pain or discomfort. pt seen by cards. first trop second, wanted 2nd, sent and awaiting results. CXR negative, plan to follow up outpatient for NST. food provided to pt as requested, safety maintained.

## 2023-08-20 NOTE — ED ADULT TRIAGE NOTE - CHIEF COMPLAINT QUOTE
[place] [facility], c/o dizziness x 2 days, thought it was coming from a tooth ache, started having chest pain en route to [facility], due for nuclear stress test

## 2023-08-20 NOTE — ED ADULT NURSE NOTE - OBJECTIVE STATEMENT
pt reports to the ED c/o of cp that started today. Pt reports the pain last 30 minutes and did not take any medication otc. Pt noted to be hnt in ED.  Pt denies any sob, n/v/d. Pt has hx of stents and felt she should be checked out for the CP she is experiencing.

## 2023-08-20 NOTE — ED PROVIDER NOTE - PATIENT PORTAL LINK FT
You can access the FollowMyHealth Patient Portal offered by [facility] by registering at the following website: [email or web address]. By joining [employer]’s FollowMyHealth portal, you will also be able to view your health information using other applications (apps) compatible with our system.

## 2023-08-20 NOTE — CONSULT NOTE ADULT - ASSESSMENT
77y old  Female PMH HLD, HTN, CAD with stents 2021, HFrecEF. Presents with chest pain located mid sternal in a small region without radiation, has been occurring with similar symptoms now for over two years since her stent in 2021.  Is scheduled to have a outpatient NST this coming tuesday.  Still having chest discomfort at time of interview. She noticed her chest pain started when she was driving to [facility] for toothache today. On Friday she endorses having a pre-syncopal episode when she was with her daughter. She was unable to recognize that she was there and had some confusion at this time.  Had a prior episode two years ago and saw a neurologist with unclear outcome or workup performed. 77y old  Female PMH HLD, HTN, CAD with stents 2021, HFrecEF. Presents with chest pain located mid sternal in a small region without radiation, has been occurring with similar symptoms now for over two years since her stent in 2021.  Is scheduled to have a outpatient NST this coming tuesday.  Still having chest discomfort at time of interview. She noticed her chest pain started when she was driving to [facility] for toothache today, non radiating. On Friday she endorses having a pre-syncopal episode when she was with her daughter. She was unable to recognize that she was there and had some confusion at this time.  Had a prior episode two years ago and saw a neurologist with unclear outcome or workup performed.

## 2023-08-20 NOTE — CONSULT NOTE ADULT - PROBLEM SELECTOR RECOMMENDATION 9
-CP now for two years unchanged in symptomatolgy since receiving stent in 2021  -EKG poor quality but no active ischemia  -CXR neg  -Trop x1 one Neg. Obtain 2nd set  -Noted to be hypertensive. Repeat reading to ensure it was anxiety related. Takes Valsartan at home. If still high will need to be addressed. With underlying CAD it will lead to further episodes of chest pain  -To have outpatient NST with Dr flores this Tuesday    Plan is if 2nd set of trops neg and SBP remain controlled. Outpatient follow up. Discussed with patient JOSE R for dizziness episodes outpatient -CP now for two years unchanged in symptomatolgy since receiving stent in 2021  -EKG poor quality but no active ischemia  -CXR neg  -Trop x1 one Neg. Obtain 2nd set  -Noted to be hypertensive. Repeat reading to ensure it was not anxiety related. Takes Valsartan at home. If still high will need to be addressed. With underlying CAD it will lead to further episodes of chest pain  -To have outpatient NST with Dr flores this Tuesday    Plan is if 2nd set of trops neg and SBP remain controlled. Outpatient follow up. Discussed with patient JOSE R for dizziness episodes outpatient

## 2023-08-20 NOTE — ED PROVIDER NOTE - OBJECTIVE STATEMENT
76 yo female pmh htn, sarcoidosis cad with stent comes to ed with chest pain which lasted 30minutes; pt did not take any meds for pain; pt noted to be hypertensive in ed ;   pt denies sob, nausea , or vomiting

## 2023-08-20 NOTE — CONSULT NOTE ADULT - SUBJECTIVE AND OBJECTIVE BOX
[facility]                                              CARDIOLOGY AT [facility]                                                   [address], [place] [address]                                             Telephone: [phone]. Fax:[phone]                                                       CARDIOLOGY CONSULTATION NOTE                                                                                             History obtained by: Patient and medical record  Community Cardiologist: Dr Flaco Perez    obtained: Yes [  ] No [ x ]  Reason for Consultation: CP  Available out pt records reviewed: Yes [  x] No [  ]    HPI:  Patient is a 77y old  Female PMH HLD, HTN, CAD with stents 2021, HFrecEF. Presents with chest pain located mid sternal in a small region without radiation, has been occurring with similar symptoms now for over two years since her stent in 2021.  Is scheduled to have a outpatient NST this coming tuesday.  Still having chest discomfort at time of interview. She noticed her chest pain started when she was driving to [facility] for toothache today. On Friday she endorses having a pre-syncopal episode when she was with her daughter. She was unable to recognize that she was there and had some confusion at this time.  Had a prior episode two years ago and saw a neurologist with unclear outcome or workup performed. Denies palpitations, fever, weight gain          CARDIAC TESTING   ECHO:  TTE 2021: 50 %STRESS:    CATH:   [facility] 2021:  Moderate disease in LAD and diag. Patent stent in LAD. Myocardial bridge distal to the stentELECTROPHYSIOLOGY:     PAST MEDICAL HISTORY  Hypertension    COPD, mild    Sarcoidosis    Brain aneurysm    Chronic arthritis or osteoarthritis    Rheumatoid arthritis    Abnormal stress test    Primary hypertension    Pulmonary sarcoidosis    History of hand surgery    History of surgery on arm        PAST SURGICAL HISTORY  S/P hip replacement, left    COPD exacerbation    History of COPD    History of surgery on arm    History of hand surgery    History of breast biopsy    History of lung biopsy    History of foot surgery        SOCIAL HISTORY:  Denies smoking/alcohol/drugs  CIGARETTES:     ALCOHOL:  DRUGS:    FAMILY HISTORY:  Family history of myocardial infarction (Father)    Family history of cerebrovascular accident (CVA) (Father)    Family history of schizophrenia (Mother)    Family history of cardiac disorder (Grandparent)      Family History of Cardiovascular Disease:  Yes [  ] No [x  ]  Coronary Artery Disease in first degree relative: Yes [  ] No [ x ]  Sudden Cardiac Death in First degree relative: Yes [  ] No [ x ]    HOME MEDICATIONS:  Multiple Vitamins oral tablet: 1 tab(s) orally once a day (10 Oct 2021 13:28)      CURRENT CARDIAC MEDICATIONS:      CURRENT OTHER MEDICATIONS:      ALLERGIES:   No Known Allergies      REVIEW OF SYMPTOMS:   CONSTITUTIONAL: No fever, no chills, no weight loss, no weight gain, no fatigue   ENMT:  No vertigo; No sinus or throat pain +tooth pain  NECK: No pain or stiffness  CARDIOVASCULAR: +chest pain, no dyspnea, no syncope/presyncope, no palpitations, no dizziness, no Orthopnea, no Paroxsymal nocturnal dyspnea  RESPIRATORY: no Shortness of breath, no cough, no wheezing  : No dysuria, no hematuria   GI: No dark color stool, no nausea, no diarrhea, no constipation, no abdominal pain   NEURO: No headache, no slurred speech   MUSCULOSKELETAL: No joint pain or swelling; No muscle, back, or extremity pain  PSYCH: No agitation, no anxiety.    ALL OTHER REVIEW OF SYSTEMS ARE NEGATIVE.    VITAL SIGNS:  T(C): 36.7 (08-20-23 @ 11:42), Max: 36.7 (08-20-23 @ 11:42)  T(F): 98.1 (08-20-23 @ 11:42), Max: 98.1 (08-20-23 @ 11:42)  HR: 87 (08-20-23 @ 11:42) (87 - 87)  BP: 189/93 (08-20-23 @ 11:42) (189/93 - 189/93)  RR: 18 (08-20-23 @ 11:42) (18 - 18)  SpO2: 98% (08-20-23 @ 11:42) (98% - 98%)    INTAKE AND OUTPUT:       PHYSICAL EXAM:  Constitutional: Comfortable . No acute distress.   HEENT: Atraumatic and normocephalic , neck is supple . no JVD. No carotid bruit.  CNS: A&Ox3. No focal deficits.   Respiratory: CTAB, unlabored   Cardiovascular: RRR normal s1 s2. No murmur. No rubs or gallop.  Gastrointestinal: Soft, non-tender. +Bowel sounds.   Extremities: 2+ Peripheral Pulses, No clubbing, cyanosis, or edema  Psychiatric: Calm . no agitation.   Skin: Warm and dry, no ulcers on extremities     LABS:  ( 20 Aug 2023 13:10 )  Troponin T  <0.01,  CPK  X    , CKMB  X    , BNP X                                  10.1   3.15  )-----------( 149      ( 20 Aug 2023 13:10 )             30.4     08-20    139  |  103  |  13.8  ----------------------------<  93  3.8   |  26.0  |  1.07    Ca    9.0      20 Aug 2023 13:10    TPro  7.2  /  Alb  4.0  /  TBili  0.3<L>  /  DBili  x   /  AST  30  /  ALT  17  /  AlkPhos  63  08-20      Urinalysis Basic - ( 20 Aug 2023 13:10 )    Color: x / Appearance: x / SG: x / pH: x  Gluc: 93 mg/dL / Ketone: x  / Bili: x / Urobili: x   Blood: x / Protein: x / Nitrite: x   Leuk Esterase: x / RBC: x / WBC x   Sq Epi: x / Non Sq Epi: x / Bacteria: x              INTERPRETATION OF TELEMETRY: SR    ECG: Poor quality, overall no acute ischemia, NSR  Prior ECG: Yes [ x ] No [  ]    RADIOLOGY & ADDITIONAL STUDIES:    X-ray:  Clear

## 2023-08-20 NOTE — CONSULT NOTE ADULT - NS ATTEND AMEND GEN_ALL_CORE FT
chornic chest pain. stable. non anginal.    planned for stresds test on tuedays has appt with her cardiologist  aspirin 81 and statins   second set and dischzarge wit outpatietn follow up

## 2023-09-01 ENCOUNTER — EMERGENCY (EMERGENCY)
Facility: HOSPITAL | Age: 78
LOS: 1 days | Discharge: HOME | End: 2023-09-01
Attending: EMERGENCY MEDICINE
Payer: MEDICARE

## 2023-09-01 VITALS
DIASTOLIC BLOOD PRESSURE: 61 MMHG | OXYGEN SATURATION: 98 % | HEART RATE: 62 BPM | TEMPERATURE: 98 F | RESPIRATION RATE: 20 BRPM | SYSTOLIC BLOOD PRESSURE: 132 MMHG

## 2023-09-01 VITALS
HEART RATE: 70 BPM | TEMPERATURE: 98 F | SYSTOLIC BLOOD PRESSURE: 169 MMHG | WEIGHT: 171.96 LBS | OXYGEN SATURATION: 97 % | RESPIRATION RATE: 20 BRPM | DIASTOLIC BLOOD PRESSURE: 79 MMHG | HEIGHT: 68 IN

## 2023-09-01 DIAGNOSIS — Z98.890 OTHER SPECIFIED POSTPROCEDURAL STATES: Chronic | ICD-10-CM

## 2023-09-01 DIAGNOSIS — Z96.642 PRESENCE OF LEFT ARTIFICIAL HIP JOINT: Chronic | ICD-10-CM

## 2023-09-01 LAB
ALBUMIN SERPL ELPH-MCNC: 3.8 G/DL — SIGNIFICANT CHANGE UP (ref 3.3–5.2)
ALP SERPL-CCNC: 59 U/L — SIGNIFICANT CHANGE UP (ref 40–120)
ALT FLD-CCNC: 15 U/L — SIGNIFICANT CHANGE UP
ANION GAP SERPL CALC-SCNC: 11 MMOL/L — SIGNIFICANT CHANGE UP (ref 5–17)
AST SERPL-CCNC: 31 U/L — SIGNIFICANT CHANGE UP
BASOPHILS # BLD AUTO: 0.02 K/UL — SIGNIFICANT CHANGE UP (ref 0–0.2)
BASOPHILS NFR BLD AUTO: 0.6 % — SIGNIFICANT CHANGE UP (ref 0–2)
BILIRUB SERPL-MCNC: 0.3 MG/DL — LOW (ref 0.4–2)
BUN SERPL-MCNC: 17.4 MG/DL — SIGNIFICANT CHANGE UP (ref 8–20)
CALCIUM SERPL-MCNC: 9.3 MG/DL — SIGNIFICANT CHANGE UP (ref 8.4–10.5)
CHLORIDE SERPL-SCNC: 102 MMOL/L — SIGNIFICANT CHANGE UP (ref 96–108)
CO2 SERPL-SCNC: 25 MMOL/L — SIGNIFICANT CHANGE UP (ref 22–29)
CREAT SERPL-MCNC: 0.93 MG/DL — SIGNIFICANT CHANGE UP (ref 0.5–1.3)
EGFR: 64 ML/MIN/1.73M2 — SIGNIFICANT CHANGE UP
EOSINOPHIL # BLD AUTO: 0.05 K/UL — SIGNIFICANT CHANGE UP (ref 0–0.5)
EOSINOPHIL NFR BLD AUTO: 1.4 % — SIGNIFICANT CHANGE UP (ref 0–6)
GLUCOSE SERPL-MCNC: 94 MG/DL — SIGNIFICANT CHANGE UP (ref 70–99)
HCT VFR BLD CALC: 33.9 % — LOW (ref 34.5–45)
HGB BLD-MCNC: 11.1 G/DL — LOW (ref 11.5–15.5)
IMM GRANULOCYTES NFR BLD AUTO: 0.3 % — SIGNIFICANT CHANGE UP (ref 0–0.9)
LYMPHOCYTES # BLD AUTO: 1.17 K/UL — SIGNIFICANT CHANGE UP (ref 1–3.3)
LYMPHOCYTES # BLD AUTO: 32.5 % — SIGNIFICANT CHANGE UP (ref 13–44)
MCHC RBC-ENTMCNC: 29.2 PG — SIGNIFICANT CHANGE UP (ref 27–34)
MCHC RBC-ENTMCNC: 32.7 GM/DL — SIGNIFICANT CHANGE UP (ref 32–36)
MCV RBC AUTO: 89.2 FL — SIGNIFICANT CHANGE UP (ref 80–100)
MONOCYTES # BLD AUTO: 0.31 K/UL — SIGNIFICANT CHANGE UP (ref 0–0.9)
MONOCYTES NFR BLD AUTO: 8.6 % — SIGNIFICANT CHANGE UP (ref 2–14)
NEUTROPHILS # BLD AUTO: 2.04 K/UL — SIGNIFICANT CHANGE UP (ref 1.8–7.4)
NEUTROPHILS NFR BLD AUTO: 56.6 % — SIGNIFICANT CHANGE UP (ref 43–77)
PLATELET # BLD AUTO: 166 K/UL — SIGNIFICANT CHANGE UP (ref 150–400)
POTASSIUM SERPL-MCNC: 4.7 MMOL/L — SIGNIFICANT CHANGE UP (ref 3.5–5.3)
POTASSIUM SERPL-SCNC: 4.7 MMOL/L — SIGNIFICANT CHANGE UP (ref 3.5–5.3)
PROT SERPL-MCNC: 7.4 G/DL — SIGNIFICANT CHANGE UP (ref 6.6–8.7)
RBC # BLD: 3.8 M/UL — SIGNIFICANT CHANGE UP (ref 3.8–5.2)
RBC # FLD: 13.2 % — SIGNIFICANT CHANGE UP (ref 10.3–14.5)
SODIUM SERPL-SCNC: 138 MMOL/L — SIGNIFICANT CHANGE UP (ref 135–145)
TROPONIN T SERPL-MCNC: <0.01 NG/ML — SIGNIFICANT CHANGE UP (ref 0–0.06)
WBC # BLD: 3.6 K/UL — LOW (ref 3.8–10.5)
WBC # FLD AUTO: 3.6 K/UL — LOW (ref 3.8–10.5)

## 2023-09-01 PROCEDURE — 71045 X-RAY EXAM CHEST 1 VIEW: CPT

## 2023-09-01 PROCEDURE — 99285 EMERGENCY DEPT VISIT HI MDM: CPT | Mod: 25

## 2023-09-01 PROCEDURE — 99285 EMERGENCY DEPT VISIT HI MDM: CPT

## 2023-09-01 PROCEDURE — 93005 ELECTROCARDIOGRAM TRACING: CPT

## 2023-09-01 PROCEDURE — 84484 ASSAY OF TROPONIN QUANT: CPT

## 2023-09-01 PROCEDURE — 80053 COMPREHEN METABOLIC PANEL: CPT

## 2023-09-01 PROCEDURE — 36415 COLL VENOUS BLD VENIPUNCTURE: CPT

## 2023-09-01 PROCEDURE — 82962 GLUCOSE BLOOD TEST: CPT

## 2023-09-01 PROCEDURE — 71045 X-RAY EXAM CHEST 1 VIEW: CPT | Mod: 26

## 2023-09-01 PROCEDURE — 85025 COMPLETE CBC W/AUTO DIFF WBC: CPT

## 2023-09-01 PROCEDURE — 93010 ELECTROCARDIOGRAM REPORT: CPT

## 2023-09-01 RX ORDER — MECLIZINE HCL 12.5 MG
1 TABLET ORAL
Qty: 15 | Refills: 0
Start: 2023-09-01 | End: 2023-09-05

## 2023-09-01 RX ORDER — MECLIZINE HCL 12.5 MG
50 TABLET ORAL ONCE
Refills: 0 | Status: COMPLETED | OUTPATIENT
Start: 2023-09-01 | End: 2023-09-01

## 2023-09-01 RX ADMIN — Medication 50 MILLIGRAM(S): at 09:15

## 2023-09-01 NOTE — ED ADULT NURSE NOTE - OBJECTIVE STATEMENT
pt care assumed at 0820, no apparent distress noted at this time. pt received A&Ox4 resting in bed comfortably with daughter at bedside. pt c/o sudden onset dizziness when she woke up this morning around 6am. pt states she fell asleep last night feeling fine and woke up at 0330 and took metoprolol since she forgot to take it before bed. pt c/o dizziness while sitting up and standing. pt is NSR on cardiac monitor.

## 2023-09-01 NOTE — ED PROVIDER NOTE - CARE PLAN
1 Principal Discharge DX:	Orthostatic dizziness  Secondary Diagnosis:	Vertigo   Principal Discharge DX:	BPPV (benign paroxysmal positional vertigo)  Secondary Diagnosis:	Orthostatic dizziness

## 2023-09-01 NOTE — ED PROVIDER NOTE - NSFOLLOWUPINSTRUCTIONS_ED_ALL_ED_FT
Please take meclizine 25mg up to three times a day as needed. Please follow up with your primary care physician within the next few days.       Vertigo      Vertigo is the feeling that you or your surroundings are moving when they are not. This feeling can come and go at any time. Vertigo often goes away on its own. Vertigo can be dangerous if it occurs while you are doing something that could endanger yourself or others, such as driving or operating machinery.    Your health care provider will do tests to try to determine the cause of your vertigo. Tests will also help your health care provider decide how best to treat your condition.      Follow these instructions at home:      Eating and drinking       A comparison of three sample cups showing dark yellow, yellow, and pale yellow urine.       A sign showing that a person should not drink beer, wine, or liquor.     •Dehydration can make vertigo worse. Drink enough fluid to keep your urine pale yellow.      • Do not drink alcohol.      Activity     •Return to your normal activities as told by your health care provider. Ask your health care provider what activities are safe for you.      •In the morning, first sit up on the side of the bed. When you feel okay, stand slowly while you hold onto something until you know that your balance is fine.      •Move slowly. Avoid sudden body or head movements or certain positions, as told by your health care provider.      •If you have trouble walking or keeping your balance, try using a cane for stability. If you feel dizzy or unstable, sit down right away.      •Avoid doing any tasks that would cause danger to you or others if vertigo occurs.      •Avoid bending down if you feel dizzy. Place items in your home so that they are easy for you to reach without bending or leaning over.      • Do not drive or use machinery if you feel dizzy.      General instructions     •Take over-the-counter and prescription medicines only as told by your health care provider.      •Keep all follow-up visits. This is important.        Contact a health care provider if:    •Your medicines do not relieve your vertigo or they make it worse.      •Your condition gets worse or you develop new symptoms.      •You have a fever.      •You develop nausea or vomiting, or if nausea gets worse.      •Your family or friends notice any behavioral changes.      •You have numbness or a prickling and tingling sensation in part of your body.        Get help right away if you:    •Are always dizzy or you faint.      •Develop severe headaches.      •Develop a stiff neck.      •Develop sensitivity to light.      •Have difficulty moving or speaking.      •Have weakness in your hands, arms, or legs.      •Have changes in your hearing or vision.      These symptoms may represent a serious problem that is an emergency. Do not wait to see if the symptoms will go away. Get medical help right away. Call your local emergency services (911 in the [place] Do not drive yourself to the hospital.       Summary    •Vertigo is the feeling that you or your surroundings are moving when they are not.      •Your health care provider will do tests to try to determine the cause of your vertigo.      •Follow instructions for home care. You may be told to avoid certain tasks, positions, or movements.      •Contact a health care provider if your medicines do not relieve your symptoms, or if you have a fever, nausea, vomiting, or changes in behavior.      •Get help right away if you have severe headaches or difficulty speaking, or you develop hearing or vision problems.      This information is not intended to replace advice given to you by your health care provider. Make sure you discuss any questions you have with your health care provider.

## 2023-09-01 NOTE — ED PROVIDER NOTE - OBJECTIVE STATEMENT
Pt is a 78 yo female with PMH of sarcoidosis, HTN, CAD s/p stents, BPPH presenting with dizziness. Pt reports that she has had dizziness that is "kind of room spinning" since 6AM today that is severe with sitting up and walking. Pt was here in ED on 8/20 where she had an ACS rule out and she went to see cardiologist 2 days ago for stress tests which were unremarkable to her knowledge. Pt has also had substernal chest discomfort that has been going on since her last visit to ED on 8/20. Pt denies SOB, abdominal pain, NVD, fever, chills. Pt is a 76 yo female with PMH of sarcoidosis, HTN, CAD s/p stents, BPPH presenting with dizziness. Pt reports that she has had dizziness that is "kind of room spinning" since 6AM today that is severe with sitting up and walking. Pt was here in ED on 8/20 where she had an ACS rule out and she went to see cardiologist 2 days ago for stress tests which were unremarkable to her knowledge. Pt has also had substernal chest discomfort that has been going on since her last visit to ED on 8/20. Pt denies fall, LOC, head trauma, SOB, abdominal pain, NVD, fever, chills.

## 2023-09-01 NOTE — ED ADULT TRIAGE NOTE - CHIEF COMPLAINT QUOTE
" I woke up and got out of the bed fast to go to the bathroom and I felt dizzy, I rested and then I it went away" pt LKW was last night, states dizziness happened at around 0600, worst with movement. denies any SOB, chest pain, N.V, confusion, unsteady gait. Dizziness felt like she was going to pass out. pt neurological stable

## 2023-09-01 NOTE — ED PROVIDER NOTE - AXIS
Physician documentation on smoking history and CT Lung Screening reviewed. All required documentation complete. Patient is a former smoker (quit 2011) with a 40 pack year history ( 1 ppd x 40 years) per physician documentation. Normal

## 2023-09-01 NOTE — ED PROVIDER NOTE - ATTENDING CONTRIBUTION TO CARE
I personally saw the patient with the resident, and completed the key components of the history and physical exam. I then discussed the management plan with the resident.    76 y/o F with PMH HTN, CAD presents for sudden onset vertigo that started when she stood up quickly to get her phone this morning. She was up at 0300 in the morning to take her dose of metoprolol that she usually takes at 2230, but forgot. She has had similar symptoms before, but not in a while. She denies any changes to medications, but just the odd time she took her metoprolol. She feels well now. She has mild chronic chest discomfort that has been evaluated and is followed by her cardiologist.    PE - NAD, well appearing, no focal neuro deficits, no ataxia, RRR, lungs CTA B/L, abd soft NT/ND    EKG WNL, patient not orthostatic, feeling better - likely 2/2 orthostatic vertigo, now resolved. Discussed with patient and daughter about not jumping up quickly.

## 2023-09-01 NOTE — ED PROVIDER NOTE - NEUROLOGICAL, MLM
Alert and oriented, no focal deficits, no motor or sensory deficits. No dysmetria noted, no nystagmus, CN2-12 intact. dizziness and weakness, unsteady gait with walking a few steps.

## 2023-09-01 NOTE — ED PROVIDER NOTE - PATIENT PORTAL LINK FT
You can access the FollowMyHealth Patient Portal offered by [facility] by registering at the following website: [email or web address]. By joining [employer]’s FollowMyHealth portal, you will also be able to view your health information using other applications (apps) compatible with our system.

## 2023-09-01 NOTE — ED PROVIDER NOTE - CLINICAL SUMMARY MEDICAL DECISION MAKING FREE TEXT BOX
Pt is a 76 yo female with PMH of sarcoidosis, HTN, CAD s/p stents, BPPH presenting with dizziness. Pt reports that she has had dizziness that is "kind of room spinning" since 6AM today that is severe with sitting up and walking. Pt was here in ED on 8/20 where she had an ACS rule out and she went to see cardiologist 2 days ago for stress tests which were unremarkable to her knowledge. Pt has also had substernal chest discomfort that has been going on since her last visit to ED on 8/20. Pt denies SOB, abdominal pain, NVD, fever, chills. CT head, CBC, CMP, trop, cxr, orthostatic. Pt is a 76 yo female with PMH of sarcoidosis, HTN, CAD s/p stents, BPPH presenting with dizziness. Pt reports that she has had dizziness that is "kind of room spinning" since 6AM today that is severe with sitting up and walking. Pt was here in ED on 8/20 where she had an ACS rule out and she went to see cardiologist 2 days ago for stress tests which were unremarkable to her knowledge. Pt has also had substernal chest discomfort that has been going on since her last visit to ED on 8/20. Pt denies SOB, abdominal pain, NVD, fever, chills. CT head, CBC, CMP, trop, cxr. orthostatic BPs unremarkable. Pt took her metoprolol at 3AM today instead of her usual time of 10:30PM. Pt likely had orthostatic hypotension 2/2 taking metoprolol in middle of the night, also given meclizine for room spinning sensation, hx of vertigo in the past. DC if BW is WNL. Pt is a 76 yo female with PMH of sarcoidosis, HTN, CAD s/p stents, BPPH presenting with dizziness. Pt reports that she has had dizziness that is "kind of room spinning" since 6AM today that is severe with sitting up and walking. Pt was here in ED on 8/20 where she had an ACS rule out and she went to see cardiologist 2 days ago for stress tests which were unremarkable to her knowledge. Pt has also had substernal chest discomfort that has been going on since her last visit to ED on 8/20. Pt denies fall, LOC, head trauma, SOB, abdominal pain, NVD, fever, chills. CT head, CBC, CMP, trop, cxr. orthostatic BPs unremarkable. Pt took her metoprolol at 3AM today instead of her usual time of 10:30PM. Pt likely had orthostatic hypotension 2/2 taking metoprolol in middle of the night, also given meclizine for room spinning sensation, hx of vertigo in the past. DC if BW is WNL. Pt is a 76 yo female with PMH of sarcoidosis, HTN, CAD s/p stents, BPPH presenting with dizziness. Pt reports that she has had dizziness that is "kind of room spinning" since 6AM today that is severe with sitting up and walking. Pt was here in ED on 8/20 where she had an ACS rule out and she went to see cardiologist 2 days ago for stress tests which were unremarkable to her knowledge. Pt has also had substernal chest discomfort that has been going on since her last visit to ED on 8/20. Pt denies fall, LOC, head trauma, SOB, abdominal pain, NVD, fever, chills. CBC, CMP, trop, cxr. orthostatic BPs unremarkable. Pt took her metoprolol at 3AM today instead of her usual time of 10:30PM. Pt likely had orthostatic hypotension 2/2 taking metoprolol in middle of the night, also given meclizine for room spinning sensation, hx of vertigo in the past. DC if BW is WNL. Pt is a 76 yo female with PMH of sarcoidosis, HTN, CAD s/p stents, BPPH presenting with dizziness. Pt reports that she has had dizziness that is "kind of room spinning" since 6AM today that is severe with sitting up and walking. Pt was here in ED on 8/20 where she had an ACS rule out and she went to see cardiologist 2 days ago for stress tests which were unremarkable to her knowledge. Pt has also had substernal chest discomfort that has been going on since her last visit to ED on 8/20. Pt denies fall, LOC, head trauma, SOB, abdominal pain, NVD, fever, chills. CBC, CMP, trop, cxr. orthostatic BPs unremarkable. Pt took her metoprolol at 3AM today instead of her usual time of 10:30PM. Pt was given meclizine for room spinning sensation, hx of vertigo in the past. CBC, CMP WNL.  Pt likely had BPPV episode in the setting of possible orthostatic hypotension 2/2 taking metoprolol in the middle of the night. Results discussed with pt. Pt safe to be discharged, instructed to take meclizine as needed and follow up with PCP.

## 2023-09-01 NOTE — ED ADULT NURSE NOTE - NSFALLRISKINTERV_ED_ALL_ED
Assistance OOB with selected safe patient handling equipment if applicable/Assistance with ambulation/Communicate fall risk and risk factors to all staff, patient, and family/Encourage patient to sit up slowly, dangle for a short time, stand at bedside before walking/Monitor gait and stability/Orthostatic vital signs/Provide patient with walking aids/Provide visual cue: yellow wristband, yellow gown, etc/Reinforce activity limits and safety measures with patient and family/Call bell, personal items and telephone in reach/Instruct patient to call for assistance before getting out of bed/chair/stretcher/Non-slip footwear applied when patient is off stretcher/[place] to call system/Physically safe environment - no spills, clutter or unnecessary equipment/Purposeful Proactive Rounding/Room/bathroom lighting operational, light cord in reach

## 2023-09-14 NOTE — CHART NOTE - NSCHARTNOTEFT_GEN_A_CORE
Preop Phone Call:  - Able to Reach Patient:  yes  - Info given to: patient having cardiac catheterization  -  and allergies confirmed: yes  - Medication Information Given: yes  - Medications To Take (specify) Ok to take a.m. meds w/sip of water  - Medications To Hold (Specify): HCTZ  - Arrival Time: 8: 15 am   - NPO after: MN  - Understanding of Information Verbalized: yes  will have a  over the age of 18  for d/c home

## 2023-09-14 NOTE — H&P ADULT - HISTORY OF PRESENT ILLNESS
77 y old  Female with PMH HLD, HTN, CAD with LAD stent in 2021, HFrEF (recent EF 55-60% in 2/2023) , recent pre-syncopal episode with confusion  (unclear for the result of neurologic workup done 2 years ago with similar symptoms) who recently visit [facility] in 8/20 with chest pain and discharged after negative troponins and EKGs presented to cardiology office for follow up. Pt underwent exercise stress test, showed mild ST depression. Pt referred to  cardiac cath for further ischemic evaluation.

## 2023-09-14 NOTE — H&P ADULT - NSHPLABSRESULTS_GEN_ALL_CORE
Stress test (8/29/23): SR with occasional PVCs, mild ST depression  Echo (2/9/23): EF 55-60%,   EKG (9/14/23): Stress test (8/29/23): SR with occasional PVCs, mild ST depression  Echo (2/9/23): EF 55-60%,   EKG (9/22/23): NSR rate 64

## 2023-09-14 NOTE — H&P ADULT - ASSESSMENT
77 y old  Female with PMH HLD, HTN, CAD with LAD stent in 2021, HFrEF (recent EF 55-60% in 2/2023) , recent pre-syncopal episode with confusion  (unclear for the result of neurologic workup done 2 years ago with similar symptoms) who recently visit [facility] in 8/20 with chest pain and discharged after negative troponins and EKGs presented to cardiology office for follow up. Pt underwent exercise stress test, showed mild ST depression. Pt referred to  cardiac cath for further ischemic evaluation.     ASA class:  Cr:  GFR:  Bleeding risk:  Donny score:  77 y old  Female with PMH HLD, HTN, CAD with LAD stent in 2021, HFrEF (recent EF 55-60% in 2/2023) , recent pre-syncopal episode with confusion  (unclear for the result of neurologic workup done 2 years ago with similar symptoms) who recently visit [facility] ER in 8/20 with chest pain and discharged after negative troponins and EKGs presented to cardiology office for follow up. Pt underwent exercise stress test, showed mild ST depression. Pt referred to  cardiac cath for further ischemic evaluation.     ASA class: III  Cr: 1.26  GFR: 44  Bleeding risk: 3.8%  Donny score: 8

## 2023-09-14 NOTE — H&P ADULT - PROBLEM SELECTOR PLAN 1
- LIU protocol pre hydration: NS 250cc IV bolus x1   - Procedure, its risks, alternatives, benefits and potential complications were discussed in detail. Risks include but not limited to bleeding, infection, allergy, renal failure requiring dialysis, stroke, vascular injury, pericardial tamponade, arrhythmias, MI and even death. Pt is agreeable and has consented for cardiac catheterization with possible stent placement and possible sedation and/ or analgesia.

## 2023-09-22 ENCOUNTER — TRANSCRIPTION ENCOUNTER (OUTPATIENT)
Age: 78
End: 2023-09-22

## 2023-09-22 ENCOUNTER — OUTPATIENT (OUTPATIENT)
Dept: OUTPATIENT SERVICES | Facility: HOSPITAL | Age: 78
LOS: 1 days | Discharge: HOME | End: 2023-09-22
Payer: MEDICARE

## 2023-09-22 VITALS
RESPIRATION RATE: 16 BRPM | HEART RATE: 71 BPM | DIASTOLIC BLOOD PRESSURE: 83 MMHG | OXYGEN SATURATION: 100 % | SYSTOLIC BLOOD PRESSURE: 151 MMHG

## 2023-09-22 VITALS
WEIGHT: 177.91 LBS | TEMPERATURE: 98 F | HEIGHT: 68 IN | HEART RATE: 65 BPM | OXYGEN SATURATION: 99 % | RESPIRATION RATE: 15 BRPM | SYSTOLIC BLOOD PRESSURE: 143 MMHG | DIASTOLIC BLOOD PRESSURE: 90 MMHG

## 2023-09-22 DIAGNOSIS — Z98.890 OTHER SPECIFIED POSTPROCEDURAL STATES: Chronic | ICD-10-CM

## 2023-09-22 DIAGNOSIS — R94.39 ABNORMAL RESULT OF OTHER CARDIOVASCULAR FUNCTION STUDY: ICD-10-CM

## 2023-09-22 DIAGNOSIS — Z96.642 PRESENCE OF LEFT ARTIFICIAL HIP JOINT: Chronic | ICD-10-CM

## 2023-09-22 PROCEDURE — C1760: CPT

## 2023-09-22 PROCEDURE — 93010 ELECTROCARDIOGRAM REPORT: CPT

## 2023-09-22 PROCEDURE — 93458 L HRT ARTERY/VENTRICLE ANGIO: CPT

## 2023-09-22 PROCEDURE — C1887: CPT

## 2023-09-22 PROCEDURE — C1894: CPT

## 2023-09-22 PROCEDURE — 93005 ELECTROCARDIOGRAM TRACING: CPT

## 2023-09-22 PROCEDURE — C1769: CPT

## 2023-09-22 RX ORDER — SODIUM CHLORIDE 9 MG/ML
1000 INJECTION INTRAMUSCULAR; INTRAVENOUS; SUBCUTANEOUS
Refills: 0 | Status: DISCONTINUED | OUTPATIENT
Start: 2023-09-22 | End: 2023-09-22

## 2023-09-22 RX ORDER — SODIUM CHLORIDE 9 MG/ML
250 INJECTION INTRAMUSCULAR; INTRAVENOUS; SUBCUTANEOUS ONCE
Refills: 0 | Status: COMPLETED | OUTPATIENT
Start: 2023-09-22 | End: 2023-09-22

## 2023-09-22 RX ORDER — CLOPIDOGREL BISULFATE 75 MG/1
1 TABLET, FILM COATED ORAL
Qty: 90 | Refills: 4
Start: 2023-09-22 | End: 2024-12-14

## 2023-09-22 RX ADMIN — SODIUM CHLORIDE 250 MILLILITER(S): 9 INJECTION INTRAMUSCULAR; INTRAVENOUS; SUBCUTANEOUS at 07:14

## 2023-09-22 RX ADMIN — SODIUM CHLORIDE 120 MILLILITER(S): 9 INJECTION INTRAMUSCULAR; INTRAVENOUS; SUBCUTANEOUS at 10:07

## 2023-09-22 NOTE — PACU DISCHARGE NOTE - COMMENTS
patient discharged to home. Right groin site benign, soft nontender, no hematoma, no signs or symptoms of bleeding. dressing clean/dry/intact. IVL D/C'd site benign. Patient without any complaints. Vital signs stable. pt OOB tolerating well with no complaints. reassessment of groin post ambulation with no changes to site. Discharge instructions given and understood by patient and Daughter [place] verbalized via teach back. Will continue to monitor patient status.

## 2023-09-22 NOTE — PROGRESS NOTE ADULT - SUBJECTIVE AND OBJECTIVE BOX
Department of Cardiology                                                               Division of Interventional Cardiology                                                               [facility] /[address] [address]                                                                                 [phone]           Post Procedure Progress Note    HPI:  77 y old  Female with PMH HLD, HTN, CAD with LAD stent in 2021, HFrEF (recent EF 55-60% in 2/2023) , recent pre-syncopal episode with confusion  (unclear for the result of neurologic workup done 2 years ago with similar symptoms) who recently visit [facility] in 8/20 with chest pain and discharged after negative troponins and EKGs presented to cardiology office for follow up. Pt underwent exercise stress test, showed mild ST depression. Pt referred to  cardiac cath for further ischemic evaluation.          (14 Sep 2023 11:51)          Vital Signs  T(C): 36.8 (09-22-23 @ 06:48), Max: 36.8 (09-22-23 @ 06:48)  HR: 62 (09-22-23 @ 09:30) (62 - 65)  BP: 143/90 (09-22-23 @ 06:48) (143/90 - 143/90)  RR: 16 (09-22-23 @ 09:30) (15 - 16)  SpO2: 100% (09-22-23 @ 09:30) (99% - 100%)  Wt(kg): --        PHYSICAL EXAM:  NEURO: Non-focal, AxOx3.  No neuro deficits   CHEST/LUNG: Clear to auscultation bilaterally; No wheeze  HEART: s1 s2 Regular rate and rhythm; No murmurs, rubs, or gallops  ABDOMEN: Soft, Nontender, Nondistended; Bowel sounds present X 4 quadrants   EXTREMITIES:  2+ Peripheral Pulses, No clubbing, cyanosis, or edema   VASCULAR: Peripheral pulses palpable 2+ bilaterally  PROCEDURE SITE: -RFA accessed, closed via mynx. - Site is without hematoma or bleeding. Sensation and KAREN intact. Distal pulses palpable 2+, capillary refill < 2 seconds. Patient denies pain, numbness, tingling, CP or SOB. Clean dry dressing applied     PROCEDURE RESULTS: full report to follow     ASSESSMENT: HPI:  77 y old  Female with PMH HLD, HTN, CAD with LAD stent in 2021, HFrEF (recent EF 55-60% in 2/2023) , recent pre-syncopal episode with confusion  (unclear for the result of neurologic workup done 2 years ago with similar symptoms) who recently visit [facility] ER in 8/20 with chest pain and discharged after negative troponins and EKGs presented to cardiology office for follow up. Pt underwent exercise stress test, showed mild ST depression. Pt referred to  cardiac cath for further ischemic evaluation.    (14 Sep 2023 11:51)      s/p LHC revealing LAD stent closed, Osital LAD diease     PLAN:  -VS, diet, activity as per post cath orders  - IVF per LIU protocol   -Encourage PO fluids  -Continue current medications; plavix 75mg daily started. rx sent to preferred pharmacy   -Post cath instructions reviewed, post sedation instructions reviewed; patient verbalizes and understands instructions  -Discussed therapeutic lifestyle changes to reduce risk factors such as following a cardiac diet, weight loss, maintaining a healthy weight, exercise, smoking cessation, medication compliance, and regular follow-up  with PCP/Cardioloigst  - Patient to be discharged home, staged PCI to be done at [facility]  -Plan of care discussed with patient, RN and Dr. Sam

## 2023-09-25 DIAGNOSIS — I25.118 ATHEROSCLEROTIC HEART DISEASE OF NATIVE CORONARY ARTERY WITH OTHER FORMS OF ANGINA PECTORIS: ICD-10-CM

## 2023-09-25 NOTE — POST DISCHARGE NOTE - DETAILS:
Post procedure phone call completed; patient understood all discharge paperwork. No questions regarding medications or pain management. MD follow up appointment made. Patient was able to rest when they were discharged. Patient will recommend [facility], no complaints of hospital stay, satisfied with care. Instructed patient to contact provider with any further questions or concerns.

## 2023-10-05 NOTE — ED ADULT NURSE NOTE - NS ED NURSE LEVEL OF CONSCIOUSNESS AFFECT
Pt here for 1st of 2 iron infusions accompanied by self. Medications, allergies and pharmacy reviewed.  IV started to left forarm with a 22 gauge angiocath at 1st attempt.  250 ml normal saline infusing slowly and 200 mg venofer given IV push slowly over 9 minutes.  Pt tolerated well.  IV discontinued and patient discharged home.   Calm/Appropriate

## 2023-10-16 NOTE — ED ADULT NURSE NOTE - SKIN TEMPERATURE MOISTURE
Reason for Disposition  • Encopresis suspected (child toilet trained, history of recent constipation and leaking small amounts of stool)  • [1] Age 1 year or older AND [2] acute ABDOMINAL pain with constipation AND [3] not relieved by suppository per care advice    Protocols used: DIARRHEA-P-AH, CONSTIPATION-P-AH     warm

## 2023-10-17 ENCOUNTER — INPATIENT (INPATIENT)
Facility: HOSPITAL | Age: 78
LOS: 0 days | Discharge: HOME | DRG: 322 | End: 2023-10-18
Attending: INTERNAL MEDICINE | Admitting: INTERNAL MEDICINE
Payer: MEDICARE

## 2023-10-17 ENCOUNTER — TRANSCRIPTION ENCOUNTER (OUTPATIENT)
Age: 78
End: 2023-10-17

## 2023-10-17 VITALS
HEIGHT: 68 IN | DIASTOLIC BLOOD PRESSURE: 77 MMHG | SYSTOLIC BLOOD PRESSURE: 160 MMHG | OXYGEN SATURATION: 100 % | HEART RATE: 66 BPM | WEIGHT: 171.96 LBS | TEMPERATURE: 99 F | RESPIRATION RATE: 19 BRPM

## 2023-10-17 DIAGNOSIS — Z98.890 OTHER SPECIFIED POSTPROCEDURAL STATES: Chronic | ICD-10-CM

## 2023-10-17 DIAGNOSIS — I25.10 ATHEROSCLEROTIC HEART DISEASE OF NATIVE CORONARY ARTERY WITHOUT ANGINA PECTORIS: ICD-10-CM

## 2023-10-17 DIAGNOSIS — Z96.642 PRESENCE OF LEFT ARTIFICIAL HIP JOINT: Chronic | ICD-10-CM

## 2023-10-17 LAB
ANION GAP SERPL CALC-SCNC: 11 MMOL/L — SIGNIFICANT CHANGE UP (ref 5–17)
ANION GAP SERPL CALC-SCNC: 11 MMOL/L — SIGNIFICANT CHANGE UP (ref 5–17)
BUN SERPL-MCNC: 19 MG/DL — SIGNIFICANT CHANGE UP (ref 7–23)
BUN SERPL-MCNC: 19 MG/DL — SIGNIFICANT CHANGE UP (ref 7–23)
CALCIUM SERPL-MCNC: 9.6 MG/DL — SIGNIFICANT CHANGE UP (ref 8.4–10.5)
CALCIUM SERPL-MCNC: 9.6 MG/DL — SIGNIFICANT CHANGE UP (ref 8.4–10.5)
CHLORIDE SERPL-SCNC: 102 MMOL/L — SIGNIFICANT CHANGE UP (ref 96–108)
CHLORIDE SERPL-SCNC: 102 MMOL/L — SIGNIFICANT CHANGE UP (ref 96–108)
CO2 SERPL-SCNC: 22 MMOL/L — SIGNIFICANT CHANGE UP (ref 22–31)
CO2 SERPL-SCNC: 22 MMOL/L — SIGNIFICANT CHANGE UP (ref 22–31)
CREAT SERPL-MCNC: 1.07 MG/DL — SIGNIFICANT CHANGE UP (ref 0.5–1.3)
CREAT SERPL-MCNC: 1.07 MG/DL — SIGNIFICANT CHANGE UP (ref 0.5–1.3)
EGFR: 54 ML/MIN/1.73M2 — LOW
EGFR: 54 ML/MIN/1.73M2 — LOW
GLUCOSE SERPL-MCNC: 100 MG/DL — HIGH (ref 70–99)
GLUCOSE SERPL-MCNC: 100 MG/DL — HIGH (ref 70–99)
HCT VFR BLD CALC: 35.1 % — SIGNIFICANT CHANGE UP (ref 34.5–45)
HCT VFR BLD CALC: 35.1 % — SIGNIFICANT CHANGE UP (ref 34.5–45)
HGB BLD-MCNC: 11.1 G/DL — LOW (ref 11.5–15.5)
HGB BLD-MCNC: 11.1 G/DL — LOW (ref 11.5–15.5)
MCHC RBC-ENTMCNC: 28.6 PG — SIGNIFICANT CHANGE UP (ref 27–34)
MCHC RBC-ENTMCNC: 28.6 PG — SIGNIFICANT CHANGE UP (ref 27–34)
MCHC RBC-ENTMCNC: 31.6 GM/DL — LOW (ref 32–36)
MCHC RBC-ENTMCNC: 31.6 GM/DL — LOW (ref 32–36)
MCV RBC AUTO: 90.5 FL — SIGNIFICANT CHANGE UP (ref 80–100)
MCV RBC AUTO: 90.5 FL — SIGNIFICANT CHANGE UP (ref 80–100)
NRBC # BLD: 0 /100 WBCS — SIGNIFICANT CHANGE UP (ref 0–0)
NRBC # BLD: 0 /100 WBCS — SIGNIFICANT CHANGE UP (ref 0–0)
PLATELET # BLD AUTO: 194 K/UL — SIGNIFICANT CHANGE UP (ref 150–400)
PLATELET # BLD AUTO: 194 K/UL — SIGNIFICANT CHANGE UP (ref 150–400)
POTASSIUM SERPL-MCNC: 5.2 MMOL/L — SIGNIFICANT CHANGE UP (ref 3.5–5.3)
POTASSIUM SERPL-MCNC: 5.2 MMOL/L — SIGNIFICANT CHANGE UP (ref 3.5–5.3)
POTASSIUM SERPL-SCNC: 5.2 MMOL/L — SIGNIFICANT CHANGE UP (ref 3.5–5.3)
POTASSIUM SERPL-SCNC: 5.2 MMOL/L — SIGNIFICANT CHANGE UP (ref 3.5–5.3)
RBC # BLD: 3.88 M/UL — SIGNIFICANT CHANGE UP (ref 3.8–5.2)
RBC # BLD: 3.88 M/UL — SIGNIFICANT CHANGE UP (ref 3.8–5.2)
RBC # FLD: 13.6 % — SIGNIFICANT CHANGE UP (ref 10.3–14.5)
RBC # FLD: 13.6 % — SIGNIFICANT CHANGE UP (ref 10.3–14.5)
SODIUM SERPL-SCNC: 135 MMOL/L — SIGNIFICANT CHANGE UP (ref 135–145)
SODIUM SERPL-SCNC: 135 MMOL/L — SIGNIFICANT CHANGE UP (ref 135–145)
WBC # BLD: 3.9 K/UL — SIGNIFICANT CHANGE UP (ref 3.8–10.5)
WBC # BLD: 3.9 K/UL — SIGNIFICANT CHANGE UP (ref 3.8–10.5)
WBC # FLD AUTO: 3.9 K/UL — SIGNIFICANT CHANGE UP (ref 3.8–10.5)
WBC # FLD AUTO: 3.9 K/UL — SIGNIFICANT CHANGE UP (ref 3.8–10.5)

## 2023-10-17 PROCEDURE — 93010 ELECTROCARDIOGRAM REPORT: CPT | Mod: 77

## 2023-10-17 PROCEDURE — 93010 ELECTROCARDIOGRAM REPORT: CPT

## 2023-10-17 RX ORDER — VALSARTAN 80 MG/1
160 TABLET ORAL EVERY 12 HOURS
Refills: 0 | Status: DISCONTINUED | OUTPATIENT
Start: 2023-10-17 | End: 2023-10-18

## 2023-10-17 RX ORDER — SODIUM CHLORIDE 9 MG/ML
1000 INJECTION INTRAMUSCULAR; INTRAVENOUS; SUBCUTANEOUS
Refills: 0 | Status: DISCONTINUED | OUTPATIENT
Start: 2023-10-17 | End: 2023-10-18

## 2023-10-17 RX ORDER — CLOPIDOGREL BISULFATE 75 MG/1
75 TABLET, FILM COATED ORAL DAILY
Refills: 0 | Status: DISCONTINUED | OUTPATIENT
Start: 2023-10-18 | End: 2023-10-18

## 2023-10-17 RX ORDER — AMLODIPINE BESYLATE 2.5 MG/1
10 TABLET ORAL DAILY
Refills: 0 | Status: DISCONTINUED | OUTPATIENT
Start: 2023-10-17 | End: 2023-10-18

## 2023-10-17 RX ORDER — ACETAMINOPHEN 500 MG
650 TABLET ORAL EVERY 6 HOURS
Refills: 0 | Status: DISCONTINUED | OUTPATIENT
Start: 2023-10-17 | End: 2023-10-18

## 2023-10-17 RX ORDER — SODIUM CHLORIDE 9 MG/ML
250 INJECTION INTRAMUSCULAR; INTRAVENOUS; SUBCUTANEOUS ONCE
Refills: 0 | Status: COMPLETED | OUTPATIENT
Start: 2023-10-17 | End: 2023-10-17

## 2023-10-17 RX ORDER — ASPIRIN/CALCIUM CARB/MAGNESIUM 324 MG
81 TABLET ORAL DAILY
Refills: 0 | Status: DISCONTINUED | OUTPATIENT
Start: 2023-10-17 | End: 2023-10-18

## 2023-10-17 RX ORDER — ATORVASTATIN CALCIUM 80 MG/1
40 TABLET, FILM COATED ORAL AT BEDTIME
Refills: 0 | Status: DISCONTINUED | OUTPATIENT
Start: 2023-10-17 | End: 2023-10-18

## 2023-10-17 RX ORDER — HYDROCHLOROTHIAZIDE 25 MG
1 TABLET ORAL
Refills: 0 | DISCHARGE

## 2023-10-17 RX ORDER — AMLODIPINE BESYLATE 2.5 MG/1
1 TABLET ORAL
Refills: 0 | DISCHARGE

## 2023-10-17 RX ORDER — VALSARTAN 80 MG/1
1 TABLET ORAL
Refills: 0 | DISCHARGE

## 2023-10-17 RX ORDER — METOPROLOL TARTRATE 50 MG
25 TABLET ORAL DAILY
Refills: 0 | Status: DISCONTINUED | OUTPATIENT
Start: 2023-10-17 | End: 2023-10-18

## 2023-10-17 RX ADMIN — ATORVASTATIN CALCIUM 40 MILLIGRAM(S): 80 TABLET, FILM COATED ORAL at 21:54

## 2023-10-17 RX ADMIN — AMLODIPINE BESYLATE 10 MILLIGRAM(S): 2.5 TABLET ORAL at 14:32

## 2023-10-17 RX ADMIN — SODIUM CHLORIDE 75 MILLILITER(S): 9 INJECTION INTRAMUSCULAR; INTRAVENOUS; SUBCUTANEOUS at 11:12

## 2023-10-17 RX ADMIN — Medication 25 MILLIGRAM(S): at 21:54

## 2023-10-17 RX ADMIN — VALSARTAN 160 MILLIGRAM(S): 80 TABLET ORAL at 17:27

## 2023-10-17 RX ADMIN — SODIUM CHLORIDE 100 MILLILITER(S): 9 INJECTION INTRAMUSCULAR; INTRAVENOUS; SUBCUTANEOUS at 14:05

## 2023-10-17 RX ADMIN — SODIUM CHLORIDE 500 MILLILITER(S): 9 INJECTION INTRAMUSCULAR; INTRAVENOUS; SUBCUTANEOUS at 11:12

## 2023-10-17 NOTE — CHART NOTE - NSCHARTNOTEFT_GEN_A_CORE
patient reported she had mechanical fall at the car [employer] yesterday- while going down the steps  missed her step - no dizziness prior   states fell on open hands  No head injury. No LOC   reports tenderness over left wrist and hand, left ankle   able to move all extremities and able to walk     Exam  left hand - able to move all digits   left wrist   left ankle   small pea size abrasion on R knee- no limitation of movement patient reported she had mechanical fall yesterday- while going down steps  missed her step - no dizziness prior   states fell on outstretched hands with brunt of weight to left wrist and hand  No head injury. No LOC   reports tenderness over left wrist and hand, left ankle  able to move all extremities and able to walk     Exam  left shoulder - able to move well, no pin point tenderness   left hand - able to move all digits, reports tenderness over knuckles, no swelling. Circulation intact. w + radial pulse and cap refill < 2 secs   left wrist feels warm, slightly swollen and tender with rotation/movement   left ankle mildly tender with rotation - no swelling.   small pea size abrasion on R knee- no limitation of movement or swelling     Discussed with Dr Sam  will order xray of left wrist and hand. No other xrays necessary at this time   Patient notified to seek medical attention from PCP after discharge if any symptoms linger or worsen after discharge patient reported she had mechanical fall yesterday- while going down steps at car [employer]  missed her step - no dizziness prior   states fell on outstretched hands with brunt of weight to left wrist and hand  No head injury. No LOC   reports tenderness over left wrist and hand, left ankle  able to move all extremities and able to walk     Exam  left shoulder - able to move well, no pin point tenderness   left hand - able to move all digits, reports tenderness over knuckles, no swelling. Circulation intact. w + radial pulse and cap refill < 2 secs   left wrist feels warm, slightly swollen and tender with rotation/movement   left ankle mildly tender with rotation - no swelling.   small pea size abrasion on R knee- no limitation of movement or swelling     Discussed with Dr Flaco Marc prn   analgesia PRN   will order xray of left wrist and hand. No other xrays necessary at this time - but continue to monitor same    Patient notified to seek medical attention from PCP/Urgent care after discharge if any symptoms linger or worsen after discharge, and see orthopedist if necessary

## 2023-10-17 NOTE — PATIENT PROFILE ADULT - FALL HARM RISK - RISK INTERVENTIONS
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
## 2023-10-17 NOTE — H&P CARDIOLOGY - BIRTH SEX
Female Detail Level: Zone Continue Regimen: Bactrim for an additional two months\\nTretinoin that patient has at home Render In Strict Bullet Format?: No

## 2023-10-17 NOTE — H&P CARDIOLOGY - BSA (M2)
Problem: Surgery Nonspecified (Adult)  Intervention: Monitor/Manage Postoperative Bleeding  Late evening patient more awake, able to eat and drink without issues. Vital signs remain within normal limit. Reports abdominal discomfort with movement, continued with scheduled tylenol for pain management. Patient was assisted with dangling and transferring to bedside commode with assistance of 1-2. Noted very minimal dried blood on the pad during transfer. Blood glucose monitored and insulin given as directed.  Spouse at bedside. Continue to be monitored per care plan.          1.91

## 2023-10-17 NOTE — PATIENT PROFILE ADULT - DO YOU LACK THE NECESSARY SUPPORT TO HELP YOU COPE WITH LIFE CHALLENGES?
Department of Anesthesiology  Preprocedure Note       Name:  Jose Cruz   Age:  63 y.o.  :  1960                                          MRN:  [id number]         Date:  2020      Surgeon: Jillian Cisneros):  Solange Jean-Baptiste MD    Procedure: Procedure(s):  IGS SINUS MAXILLARY ANTROSTOMY, FRONTAL SINUSOTOMY WITH REMOVAL OF TISSUE, ANTERIOR AND POSTERIOR ETHMOIDECTOMY, SPHENOIDECTOMY-ALL BILATERAL    Medications prior to admission:   Prior to Admission medications    Medication Sig Start Date End Date Taking? Authorizing Provider   Multiple Vitamins-Minerals (MULTI FOR HER) TABS Take 1 tablet by mouth daily   Yes Historical Provider, MD   vitamin C (ASCORBIC ACID) 500 MG tablet Take 500 mg by mouth daily   Yes Historical Provider, MD   calcium carbonate 600 MG TABS tablet Take 1 tablet by mouth daily   Yes Historical Provider, MD   Red Yeast Rice 600 MG TABS Take 1 tablet by mouth daily   Yes Historical Provider, MD   Flaxseed, Linseed, (FLAXSEED OIL PO) Take 500 mg by mouth daily   Yes Historical Provider, MD   fluticasone (FLONASE) 50 MCG/ACT nasal spray 1 spray by Nasal route daily 20  Yes Solange Jean-Baptiste MD   Nintedanib Esylate 150 MG CAPS Take by mouth    Historical Provider, MD       Current medications:    Current Facility-Administered Medications   Medication Dose Route Frequency Provider Last Rate Last Dose    Dexamethasone Sodium Phosphate injection 12 mg  12 mg Intravenous 30 Min Pre-Op Solange Jean-Baptiste MD        0.9 % sodium chloride infusion   Intravenous Continuous Solange Jean-Baptiste MD           Allergies:     Allergies   Allergen Reactions    Bactrim [Sulfamethoxazole-Trimethoprim] Hives    Sulfa Antibiotics Hives       Problem List:    Patient Active Problem List   Diagnosis Code    Chronic frontal sinusitis J32.1    Chronic ethmoidal sinusitis J32.2    Chronic maxillary sinusitis, 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    Chronic sphenoidal sinusitis, 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    Sarcoidosis of lung ([facility] [address] D86.0       Past Medical History:        Diagnosis Date    Chronic idiopathic pulmonary fibrosis (HCC)        Past Surgical History:        Procedure Laterality Date    CARPAL TUNNEL RELEASE Bilateral     COLONOSCOPY      TONSILLECTOMY AND ADENOIDECTOMY      TUBAL LIGATION         Social History:    Social History     Tobacco Use    Smoking status: Never Smoker    Smokeless tobacco: Never Used   Substance Use Topics    Alcohol use: Not Currently     Comment: rare                                Counseling given: Not Answered      Vital Signs (Current):   Vitals:    07/02/20 0915 07/08/20 0638   BP:  (!) 108/59   Pulse:  74   Resp:  16   Temp:  96.4 °F (35.8 °C)   SpO2:  100%   Weight: 129 lb (58.5 kg) 130 lb 6.4 oz (59.1 kg)   Height: 5' 4\" (1.626 m) 5' 3.5\" (1.613 m)                                              BP Readings from Last 3 Encounters:   07/08/20 (!) 108/59   06/09/20 114/68   04/28/20 114/72       NPO Status:                                                                                 BMI:   Wt Readings from Last 3 Encounters:   07/08/20 130 lb 6.4 oz (59.1 kg)   06/09/20 129 lb (58.5 kg)   04/28/20 134 lb 9.6 oz (61.1 kg)     Body mass index is 22.74 kg/m². CBC: No results found for: WBC, RBC, HGB, HCT, MCV, RDW, PLT    CMP: No results found for: NA, K, CL, CO2, BUN, CREATININE, GFRAA, AGRATIO, LABGLOM, GLUCOSE, PROT, CALCIUM, BILITOT, ALKPHOS, AST, ALT    POC Tests: No results for input(s): POCGLU, POCNA, POCK, POCCL, POCBUN, POCHEMO, POCHCT in the last 72 hours.     Coags: No results found for: PROTIME, INR, APTT    HCG (If Applicable): No results found for: PREGTESTUR, PREGSERUM, HCG, HCGQUANT     ABGs: No results found for: PHART, PO2ART, CAI2NWD, UCU9YMG, BEART, F4HSTUOU     Type & Screen (If Applicable):  No results found for: LABABO, LABRH    Drug/Infectious Status (If Applicable):  No results found for: HIV, HEPCAB    COVID-19 Screening (If Applicable): No results found for: COVID19      Anesthesia Evaluation   no history of anesthetic complications:   Airway: Mallampati: II  TM distance: >3 FB   Neck ROM: full  Mouth opening: > = 3 FB Dental:          Pulmonary:normal exam              Patient did not smoke on day of surgery. Cardiovascular:  Exercise tolerance: good (>4 METS),                     Neuro/Psych:   Negative Neuro/Psych ROS              GI/Hepatic/Renal: Neg GI/Hepatic/Renal ROS            Endo/Other:              Pt had no PAT visit        ROS comment: Sarcoidosis Abdominal:           Vascular: negative vascular ROS. Anesthesia Plan      general     ASA 2       Induction: intravenous. MIPS: Postoperative opioids intended and Prophylactic antiemetics administered. Anesthetic plan and risks discussed with patient. Plan discussed with CRNA.                   Jarod Brown MD   7/8/2020 yes

## 2023-10-17 NOTE — H&P CARDIOLOGY - HISTORY OF PRESENT ILLNESS
77F with PMH of HTN, HLD, CAD with LAD stent in 2021, HFrEF (recent EF 55-60% in 2/2023) initially presents with CP with positive stress test with mild ST depression. Patient underwent diagnostic LHC with stenosis in oLM, and patient now presents for staged PCI. Reports chest discomfort across the midsternal area without radiation and progressively worsening SOB at rest and on exertion. Had a likely mechanical fall yesterday, but denies LOC or head trauma. Denies lightheadedness, HA, abdominal pain, N/V, hematuria, BRBPR, melena, syncope, or any other complaints No implanted cardiac devices. No CP, SOB, palpitation at this time.     Cards: Dr. Anton Sam  [facility] 9/2023 - Moderate to severe LM disease. Moderate to severe 3 vessel CAD (%, LCx 10%, RCA 10%). Ostial LM disease with 75% stenosis, Distal LAD occluded secondary to ISR, R-L collaterals to dLAD via RFA  TTE 2/2023 - Normal LVEF 55-60%, No WMA. Mild MR, NC, TR

## 2023-10-17 NOTE — ASU PATIENT PROFILE, ADULT - FALL HARM RISK - RISK INTERVENTIONS

## 2023-10-18 ENCOUNTER — TRANSCRIPTION ENCOUNTER (OUTPATIENT)
Age: 78
End: 2023-10-18

## 2023-10-18 VITALS
HEART RATE: 70 BPM | RESPIRATION RATE: 19 BRPM | SYSTOLIC BLOOD PRESSURE: 118 MMHG | DIASTOLIC BLOOD PRESSURE: 78 MMHG | OXYGEN SATURATION: 98 % | TEMPERATURE: 98 F

## 2023-10-18 DIAGNOSIS — I25.10 ATHEROSCLEROTIC HEART DISEASE OF NATIVE CORONARY ARTERY WITHOUT ANGINA PECTORIS: ICD-10-CM

## 2023-10-18 DIAGNOSIS — I10 ESSENTIAL (PRIMARY) HYPERTENSION: ICD-10-CM

## 2023-10-18 DIAGNOSIS — E78.5 HYPERLIPIDEMIA, UNSPECIFIED: ICD-10-CM

## 2023-10-18 PROCEDURE — 99232 SBSQ HOSP IP/OBS MODERATE 35: CPT

## 2023-10-18 PROCEDURE — 73130 X-RAY EXAM OF HAND: CPT | Mod: 26,LT

## 2023-10-18 PROCEDURE — 73110 X-RAY EXAM OF WRIST: CPT | Mod: 26,LT

## 2023-10-18 RX ORDER — CLOPIDOGREL BISULFATE 75 MG/1
1 TABLET, FILM COATED ORAL
Qty: 90 | Refills: 4
Start: 2023-10-18 | End: 2025-01-09

## 2023-10-18 RX ORDER — ACETAMINOPHEN 500 MG
2 TABLET ORAL
Qty: 0 | Refills: 0 | DISCHARGE
Start: 2023-10-18

## 2023-10-18 RX ADMIN — AMLODIPINE BESYLATE 10 MILLIGRAM(S): 2.5 TABLET ORAL at 06:21

## 2023-10-18 RX ADMIN — Medication 81 MILLIGRAM(S): at 06:21

## 2023-10-18 RX ADMIN — CLOPIDOGREL BISULFATE 75 MILLIGRAM(S): 75 TABLET, FILM COATED ORAL at 06:21

## 2023-10-18 RX ADMIN — VALSARTAN 160 MILLIGRAM(S): 80 TABLET ORAL at 06:21

## 2023-10-18 NOTE — PHYSICAL THERAPY INITIAL EVALUATION ADULT - NSPTDMEREC_GEN_A_CORE
rolling walker Patient will require a rolling walker at home due to decreased balance, strength and endurance to help complete MRADL's (Mobility related aides for daily living)./rolling walker

## 2023-10-18 NOTE — PROGRESS NOTE ADULT - SUBJECTIVE AND OBJECTIVE BOX
HPI:  77F with PMH of HTN, HLD, CAD with LAD stent in , HFrEF (recent EF 55-60% in 2023) initially presents with CP with positive stress test with mild ST depression. Patient underwent diagnostic LHC with stenosis in oLM, and patient now presents for staged PCI. Reports chest discomfort across the midsternal area without radiation and progressively worsening SOB at rest and on exertion. Had a likely mechanical fall yesterday, but denies LOC or head trauma. Denies lightheadedness, HA, abdominal pain, N/V, hematuria, BRBPR, melena, syncope, or any other complaints No implanted cardiac devices. No CP, SOB, palpitation at this time.     Cards: Dr. Anton Sam  [facility] 2023 - Moderate to severe LM disease. Moderate to severe 3 vessel CAD (%, LCx 10%, RCA 10%). Ostial LM disease with 75% stenosis, Distal LAD occluded secondary to ISR, R-L collaterals to dLAD via RFA  TTE 2023 - Normal LVEF 55-60%, No WMA. Mild MR, AK, TR (17 Oct 2023 10:16)    Patient is a 78y old  Female who presents with a chief complaint of         Allergies    No Known Allergies    Intolerances        Medications:  acetaminophen     Tablet .. 650 milliGRAM(s) Oral every 6 hours PRN  amLODIPine   Tablet 10 milliGRAM(s) Oral daily  aspirin  chewable 81 milliGRAM(s) Oral daily  atorvastatin 40 milliGRAM(s) Oral at bedtime  clopidogrel Tablet 75 milliGRAM(s) Oral daily  hydrochlorothiazide 25 milliGRAM(s) Oral daily  metoprolol succinate ER 25 milliGRAM(s) Oral daily  sodium chloride 0.9%. 1000 milliLiter(s) IV Continuous <Continuous>  sodium chloride 0.9%. 1000 milliLiter(s) IV Continuous <Continuous>  valsartan 160 milliGRAM(s) Oral every 12 hours      Vitals:  T(C): 36.7 (10-18-23 @ 00:10), Max: 37.1 (10-17-23 @ 10:13)  HR: 61 (10-18-23 @ 00:10) (61 - 83)  BP: 137/63 (10-18-23 @ 00:10) (129/61 - 191/79)  BP(mean): 91 (10-18-23 @ 00:10) (88 - 113)  RR: 17 (10-18-23 @ 00:10) (17 - 19)  SpO2: 99% (10-18-23 @ 00:10) (97% - 100%)  Wt(kg): --  Daily Height in cm: 172.7 (17 Oct 2023 10:16)    Daily Weight in k (17 Oct 2023 10:16)  I&O's Summary    17 Oct 2023 07:01  -  18 Oct 2023 01:59  --------------------------------------------------------  IN: 120 mL / OUT: 0 mL / NET: 120 mL          Physical Exam:  Appearance: Normal  Eyes: PERRL, EOMI  HENT: Normal oral muscosa, NC/AT  Cardiovascular: S1S2, RRR, No M/R/G, no JVD, No Lower extremity edema  Procedural Access Site: No hematoma, Non-tender to palpation, 2+ pulse, No bruit, No Ecchymosis  Respiratory: Clear to auscultation bilaterally  Gastrointestinal: Soft, Non tender, Normal Bowel Sounds  Musculoskeletal: No clubbing, No joint deformity   Neurologic: Non-focal  Lymphatic: No lymphadenopathy  Psychiatry: AAOx3, Mood & affect appropriate  Skin: No rashes, No ecchymoses, No cyanosis    10-17    135  |  102  |  19  ----------------------------<  100<H>  5.2   |  22  |  1.07    Ca    9.6      17 Oct 2023 10:33              Lipid panel   Hgb A1c                         11.1   3.90  )-----------( 194      ( 17 Oct 2023 10:33 )             35.1         ECG: SR 66 bpm

## 2023-10-18 NOTE — DISCHARGE NOTE PROVIDER - NSDCCPTREATMENT_GEN_ALL_CORE_FT
PRINCIPAL PROCEDURE  Procedure: Placement of coronary artery stent  Findings and Treatment: One stent to the unprotected left main. Continue DAPT with aspirin, Plavix.

## 2023-10-18 NOTE — DISCHARGE NOTE PROVIDER - CARE PROVIDER_API CALL
Anton Sam  Interventional Cardiology  [address] [place] [address]  Phone: [phone]  Fax: [phone]  Follow Up Time: 2 weeks

## 2023-10-18 NOTE — DISCHARGE NOTE PROVIDER - NSDCMRMEDTOKEN_GEN_ALL_CORE_FT
acetaminophen 325 mg oral tablet: 2 tab(s) orally every 6 hours As needed Mild Pain (1 - 3)  amLODIPine 10 mg oral tablet: 1 tab(s) orally once a day  aspirin 81 mg oral tablet, chewable: 1 tab(s) orally once a day   atorvastatin 40 mg oral tablet: 1 tab(s) orally once a day (at bedtime)  clopidogrel 75 mg oral tablet: 1 tab(s) orally once a day  hydroCHLOROthiazide 25 mg oral tablet: 1 tab(s) orally once a day  meclizine 25 mg oral tablet: 1 tab(s) orally 3 times a day as needed for  dizziness  metoprolol succinate 25 mg oral tablet, extended release: 1 tab(s) orally once a day  Multiple Vitamins oral tablet: 1 tab(s) orally once a day  valsartan 160 mg oral tablet: 1 tab(s) orally 2 times a day

## 2023-10-18 NOTE — DISCHARGE NOTE NURSING/CASE MANAGEMENT/SOCIAL WORK - PATIENT PORTAL LINK FT
You can access the FollowMyHealth Patient Portal offered by [facility] by registering at the following website: [email or web address]. By joining [facility]’s FollowMyHealth portal, you will also be able to view your health information using other applications (apps) compatible with our system.

## 2023-10-18 NOTE — PHYSICAL THERAPY INITIAL EVALUATION ADULT - ADDITIONAL COMMENTS
Pt lives alone in an elevator apt w/ no steps to enter. PTA amb w/o an assist device but would use st cane prn for long distant walking. Pt reports h/o falls w/ most recent on day of admit (fell on stairs at car [employer]).

## 2023-10-18 NOTE — PROGRESS NOTE ADULT - ASSESSMENT
77 y/o female with above pmhx, cardiac cath with one stent to the unprotected left main on 10/17/23.

## 2023-10-18 NOTE — DISCHARGE NOTE PROVIDER - HOSPITAL COURSE
HPI:  77 F with PMH of HTN, HLD, CAD with LAD stent in 2021, HFrEF (recent EF 55-60% in 2/2023) initially presents with CP with positive stress test with mild ST depression. Patient underwent diagnostic LHC with stenosis in oLM, and patient now presents for staged PCI. Reports chest discomfort across the midsternal area without radiation and progressively worsening SOB at rest and on exertion. Had a likely mechanical fall yesterday, but denies LOC or head trauma. Denies lightheadedness, HA, abdominal pain, N/V, hematuria, BRBPR, melena, syncope, or any other complaints No implanted cardiac devices. No CP, SOB, palpitation at this time.     10/17 cardiac cath with one stent to the unprotected left main. Right femoral cath site without swelling, bleeding.   HPI:  77 F with PMH of HTN, HLD, CAD with LAD stent in 2021, HFrEF (recent EF 55-60% in 2/2023) initially presents with CP with positive stress test with mild ST depression. Patient underwent diagnostic LHC with stenosis in oLM, and patient now presents for staged PCI. Reports chest discomfort across the midsternal area without radiation and progressively worsening SOB at rest and on exertion. Had a likely mechanical fall yesterday, but denies LOC or head trauma. Denies lightheadedness, HA, abdominal pain, N/V, hematuria, BRBPR, melena, syncope, or any other complaints No implanted cardiac devices. No CP, SOB, palpitation at this time.     10/17 cardiac cath with one stent to the unprotected left main. Right femoral cath site without swelling, bleeding.  10/18 RFA stable w/o bleeding or hematoma, no events on tele, cleared for dc home this am with outpt cards follow up

## 2023-10-18 NOTE — DISCHARGE NOTE PROVIDER - NSDCCPCAREPLAN_GEN_ALL_CORE_FT
PRINCIPAL DISCHARGE DIAGNOSIS  Diagnosis: CAD (coronary artery disease)  Assessment and Plan of Treatment: Do not stop your aspirin or Plavix unless instructed to do so by your cardiologist, they help keep your stented arteries open.   No heavy lifting, strenuous activity, bending, straining, or unnecessary stair climbing for 2 weeks. No driving for 2 days. You may shower 24 hours following the procedure but avoid baths/swimming for 1 week. Check your groin site for bleeding and/or swelling daily following procedure and call your doctor immediately if it occurs or if you experience increased pain at the site. Follow up with your cardiologist in 1-2 weeks. You may call [facility] if you have any questions/concerns regarding your procedure [phone].      SECONDARY DISCHARGE DIAGNOSES  Diagnosis: HTN (hypertension)  Assessment and Plan of Treatment: Continue with your blood pressure medications; eat a heart healthy diet with low salt diet; exercise regularly (consult with your physician or cardiologist first); maintain a heart healthy weight; if you smoke - quit (A resource to help you stop smoking is the [facility] [employer] Control – phone number [phone].); include healthy ways to manage stress. Continue to follow with your primary care physician or cardiologist.    Diagnosis: HLD (hyperlipidemia)  Assessment and Plan of Treatment: Continue with your cholesterol medications. Eat a heart healthy diet that is low in saturated fats and salt, and includes whole grains, fruits, vegetables and lean protein; exercise regularly (consult with your physician or cardiologist first); maintain a heart healthy weight; if you smoke - quit (A resource to help you stop smoking is the [facility] [employer] for Tobacco Control – phone number [phone].). Continue to follow with your primary physician or cardiologist.

## 2023-10-18 NOTE — DISCHARGE NOTE NURSING/CASE MANAGEMENT/SOCIAL WORK - NSDCPEFALRISK_GEN_ALL_CORE
For information on Fall & Injury Prevention, visit: [email or web address] OR  [email or web address] OR  https://www.cdc.gov/steadi/patient.html

## 2023-10-18 NOTE — PHYSICAL THERAPY INITIAL EVALUATION ADULT - PERTINENT HX OF CURRENT PROBLEM, REHAB EVAL
77F with PMH of HTN, HLD, CAD with LAD stent in 2021, HFrEF (recent EF 55-60% in 2/2023) initially presents with CP with positive stress test with mild ST depression. Patient underwent diagnostic LHC with stenosis in oLM, and patient now presents for staged PCI. Reports chest discomfort across the midsternal area without radiation and progressively worsening SOB at rest and on exertion. Had a likely mechanical fall yesterday, but denies LOC or head trauma. Denies lightheadedness, HA, abdominal pain, N/V, hematuria, BRBPR, melena, syncope, or any other complaints No implanted cardiac devices

## 2023-10-18 NOTE — DISCHARGE NOTE PROVIDER - NSDCFUADDINST_GEN_ALL_CORE_FT
Wound Care:   the day AFTER your procedure remove bandage GENTTLY, and clean using  mild soap and gentle warm, water stream, pat dry. leave OPEN to air. YOU MAY SHOWER   DO NOT apply lotions, creams, ointments, powder, parfumes to your incision site  DO NOT SOAK your site for 1 week ( no baths, no pools, no tubs, etc...)  Check  your groin and /or wirst daily.A small amount of bruising, and soarness are normal    ACTIVITY: for 24 hours   - DO NOT DRIVE  - DO NOT make any important decisions or sign legal documents   - DO NOT operate heavy machinaries   - you may resume sexual activity in 48 hours, unless otherwise instructed by your cardiologist     If your procedure was done through the WRIST: for the NEXT 3DAYS:  - avoid pushing, pulling, with that affected wrist   - avoid repeated movement of that hand and wrist ( eg: typing, hammering)  - DO NOT LIFT anything more than 5 lbs     If your procedure was done through the GROIN: for the NEXT 5 DAYS  - Limit climbing stairs, DO NOT soak in bathtub or pool  - no strenous activities, pushing, pulling, straining  - Do not lift anything 10lbs or heavier     MEDICATION:   take your medications as explained ( see discharge paperwork)   If you received a STENT, you will be taking antiplatelet medications to KEEP YOUR STENT OPEN ( eg: Aspirin, Plavix, Brilinta, Effient, etc).  Take as prescribed DO NOT STOP taking them without consulting with your cardiologist first.     Follow heart healthy diet reccomended by your doctor, , if you smoke STOP SMOKING ( may call [phone] for center of tobacco control if you need assistance)     CALL your doctor to make appointment in 2 WEEKS     ***CALL YOUR DOCTOR***  if you experience: fever, chills, body aches, or severe pain, swelling, redness, heat or yellow discharge at incision site  If you experience Bleeding or excruciating pain at the procedural site, sweliing ( golf ball size) at your procedural site  If you experience CHEST PAIN  If you experience extremity numbness, tingling, temperature change ( of your procedural site)   If you are unable to reach your doctor, you may contact:   -Cardiology Office at [facility] at [phone] or   - [facility] [phone] - [facility] [phone]

## 2023-11-13 PROCEDURE — 97161 PT EVAL LOW COMPLEX 20 MIN: CPT

## 2023-11-13 PROCEDURE — 93005 ELECTROCARDIOGRAM TRACING: CPT

## 2023-11-13 PROCEDURE — 80048 BASIC METABOLIC PNL TOTAL CA: CPT

## 2023-11-13 PROCEDURE — 73130 X-RAY EXAM OF HAND: CPT

## 2023-11-13 PROCEDURE — C1887: CPT

## 2023-11-13 PROCEDURE — C1874: CPT

## 2023-11-13 PROCEDURE — C1760: CPT

## 2023-11-13 PROCEDURE — C1769: CPT

## 2023-11-13 PROCEDURE — C9600: CPT | Mod: LM

## 2023-11-13 PROCEDURE — 85027 COMPLETE CBC AUTOMATED: CPT

## 2023-11-13 PROCEDURE — C1894: CPT

## 2023-11-13 PROCEDURE — C1725: CPT

## 2023-11-13 PROCEDURE — 36415 COLL VENOUS BLD VENIPUNCTURE: CPT

## 2023-11-13 PROCEDURE — 73110 X-RAY EXAM OF WRIST: CPT

## 2023-12-04 ENCOUNTER — APPOINTMENT (OUTPATIENT)
Dept: PULMONOLOGY | Facility: CLINIC | Age: 78
End: 2023-12-04
Payer: MEDICARE

## 2023-12-04 VITALS
DIASTOLIC BLOOD PRESSURE: 82 MMHG | BODY MASS INDEX: 26.07 KG/M2 | HEART RATE: 82 BPM | SYSTOLIC BLOOD PRESSURE: 128 MMHG | HEIGHT: 68 IN | OXYGEN SATURATION: 98 % | WEIGHT: 172 LBS | RESPIRATION RATE: 16 BRPM

## 2023-12-04 DIAGNOSIS — Z86.79 PERSONAL HISTORY OF OTHER DISEASES OF THE CIRCULATORY SYSTEM: ICD-10-CM

## 2023-12-04 PROCEDURE — 99204 OFFICE O/P NEW MOD 45 MIN: CPT

## 2023-12-04 RX ORDER — METOPROLOL TARTRATE 25 MG/1
25 TABLET, FILM COATED ORAL
Refills: 0 | Status: ACTIVE | COMMUNITY

## 2023-12-04 RX ORDER — AMLODIPINE BESYLATE 10 MG/1
10 TABLET ORAL
Refills: 0 | Status: ACTIVE | COMMUNITY

## 2023-12-04 RX ORDER — VALSARTAN 160 MG/1
160 TABLET, COATED ORAL
Refills: 0 | Status: ACTIVE | COMMUNITY

## 2023-12-04 RX ORDER — ASPIRIN 81 MG
81 TABLET, DELAYED RELEASE (ENTERIC COATED) ORAL
Refills: 0 | Status: ACTIVE | COMMUNITY

## 2023-12-04 RX ORDER — CLOPIDOGREL BISULFATE 75 MG/1
75 TABLET, FILM COATED ORAL
Refills: 0 | Status: ACTIVE | COMMUNITY

## 2023-12-04 RX ORDER — ATORVASTATIN CALCIUM 40 MG/1
40 TABLET, FILM COATED ORAL
Refills: 0 | Status: ACTIVE | COMMUNITY

## 2024-01-30 ENCOUNTER — APPOINTMENT (OUTPATIENT)
Dept: PULMONOLOGY | Facility: CLINIC | Age: 79
End: 2024-01-30
Payer: MEDICARE

## 2024-01-30 VITALS — BODY MASS INDEX: 27 KG/M2 | WEIGHT: 170 LBS | HEIGHT: 66.5 IN

## 2024-01-30 VITALS — SYSTOLIC BLOOD PRESSURE: 110 MMHG | DIASTOLIC BLOOD PRESSURE: 72 MMHG

## 2024-01-30 VITALS — OXYGEN SATURATION: 98 % | HEART RATE: 74 BPM | RESPIRATION RATE: 16 BRPM

## 2024-01-30 DIAGNOSIS — G47.33 OBSTRUCTIVE SLEEP APNEA (ADULT) (PEDIATRIC): ICD-10-CM

## 2024-01-30 PROCEDURE — 94010 BREATHING CAPACITY TEST: CPT

## 2024-01-30 PROCEDURE — 99214 OFFICE O/P EST MOD 30 MIN: CPT | Mod: 25

## 2024-01-30 NOTE — PHYSICAL EXAM
[No Acute Distress] : no acute distress [Normal Appearance] : normal appearance [Normal Rate/Rhythm] : normal rate/rhythm [Normal S1, S2] : normal s1, s2 [No Gallops] : no gallops [No Resp Distress] : no resp distress [No Acc Muscle Use] : no acc muscle use [Normal Rhythm and Effort] : normal rhythm and effort [Clear to Auscultation Bilaterally] : clear to auscultation bilaterally [Normal to Percussion] : normal to percussion [No Abnormalities] : no abnormalities [No Clubbing] : no clubbing [No Cyanosis] : no cyanosis [No Edema] : no edema [FROM] : FROM [Normal Color/ Pigmentation] : normal color/ pigmentation [No Focal Deficits] : no focal deficits [Oriented x3] : oriented x3 [Normal Affect] : normal affect

## 2024-01-30 NOTE — CONSULT LETTER
[Consult Letter:] : I had the pleasure of evaluating your patient, [unfilled]. [Please see my note below.] : Please see my note below. [Sincerely,] : Sincerely, [Dear  ___] : Dear  [unfilled], [DrDayo  ___] : Dr. WESLEY [FreeTextEntry3] : Francisco Ortega DO [facility] Director Pulmonary Division Medical Director Respiratory Therapy [facility]

## 2024-01-30 NOTE — DISCUSSION/SUMMARY
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

## 2024-01-30 NOTE — PROCEDURE
[FreeTextEntry1] : Hospital records reviewed CXR mild elevation L ivy, 9/23 no change from CT chest  2019 labs reviewed, LFTs normal

## 2024-01-30 NOTE — HISTORY OF PRESENT ILLNESS
[Never] : never [TextBox_4] : no fever, chill, chest pain chronic dyspnea follows with cardiology [place] no sputum

## 2024-02-05 ENCOUNTER — OUTPATIENT (OUTPATIENT)
Dept: OUTPATIENT SERVICES | Facility: HOSPITAL | Age: 79
LOS: 1 days | End: 2024-02-05
Payer: MEDICARE

## 2024-02-05 DIAGNOSIS — G47.33 OBSTRUCTIVE SLEEP APNEA (ADULT) (PEDIATRIC): ICD-10-CM

## 2024-02-05 DIAGNOSIS — Z98.890 OTHER SPECIFIED POSTPROCEDURAL STATES: Chronic | ICD-10-CM

## 2024-02-05 DIAGNOSIS — Z96.642 PRESENCE OF LEFT ARTIFICIAL HIP JOINT: Chronic | ICD-10-CM

## 2024-02-05 PROCEDURE — 95800 SLP STDY UNATTENDED: CPT | Mod: 26

## 2024-02-05 PROCEDURE — 95800 SLP STDY UNATTENDED: CPT

## 2024-04-29 ENCOUNTER — APPOINTMENT (OUTPATIENT)
Dept: PULMONOLOGY | Facility: CLINIC | Age: 79
End: 2024-04-29
Payer: MEDICARE

## 2024-04-29 VITALS
RESPIRATION RATE: 16 BRPM | HEART RATE: 72 BPM | BODY MASS INDEX: 28.27 KG/M2 | WEIGHT: 178 LBS | OXYGEN SATURATION: 98 % | DIASTOLIC BLOOD PRESSURE: 62 MMHG | HEIGHT: 66.5 IN | SYSTOLIC BLOOD PRESSURE: 124 MMHG

## 2024-04-29 DIAGNOSIS — J98.6 DISORDERS OF DIAPHRAGM: ICD-10-CM

## 2024-04-29 DIAGNOSIS — D86.9 SARCOIDOSIS, UNSPECIFIED: ICD-10-CM

## 2024-04-29 DIAGNOSIS — R91.8 OTHER NONSPECIFIC ABNORMAL FINDING OF LUNG FIELD: ICD-10-CM

## 2024-04-29 DIAGNOSIS — R06.09 OTHER FORMS OF DYSPNEA: ICD-10-CM

## 2024-04-29 DIAGNOSIS — R07.9 CHEST PAIN, UNSPECIFIED: ICD-10-CM

## 2024-04-29 PROCEDURE — G2211 COMPLEX E/M VISIT ADD ON: CPT

## 2024-04-29 PROCEDURE — 99214 OFFICE O/P EST MOD 30 MIN: CPT

## 2024-04-29 RX ORDER — HYDROCHLOROTHIAZIDE 25 MG/1
25 TABLET ORAL
Refills: 0 | Status: DISCONTINUED | COMMUNITY
End: 2024-04-29

## 2024-04-29 NOTE — HISTORY OF PRESENT ILLNESS
[Never] : never [TextBox_4] : complaining of exertional cp x 2 months none currently but she gets when she walks longer distances No fever, chill, chest pain no purulent sputum [place] [facility] , had ct scan [facility] by report with multiple  nodules, largest 5 mm ? scarring bases

## 2024-04-29 NOTE — DISCUSSION/SUMMARY
[FreeTextEntry1] : Sarcoidosis by Vats per pt hx, Never smoker, no hx asthma Chronic exertional dyspnea, CAD, now with exertional CP x last 2 months, none currently I spoke with her Cardiologist Dr Sam office and pt  was there few weeks ago They will contact her today Spirometry with mild restriction, no obstruction Current lung exam is normal, normal sp02 Home sleep study , negative for any sig LIAM or nocturnal desaturation CT scan ordered by [facility], at [facility], unable to review images, nodules ( largest  5mm) and mild basilar atelectasis or scarring reported, no adenopathy, enlarged Cor and PA Will repeat scan and spirometry at follow up, hopefully will be able to compare images Cardiology eval as above or ER as advised 3 months or sooner if needed with CT scan and spirometry

## 2024-04-29 NOTE — PROCEDURE
[FreeTextEntry1] : CT scan report [facility] imaging 4/24 as above Sleep study- home was negative Spirometry 1/24 very mild restrictive impairment

## 2024-04-29 NOTE — CONSULT LETTER
[Dear  ___] : Dear  [unfilled], [Consult Letter:] : I had the pleasure of evaluating your patient, [unfilled]. [Please see my note below.] : Please see my note below. [Sincerely,] : Sincerely, [DrDayo  ___] : Dr. WESLEY [FreeTextEntry3] : Francisco Ortega DO [facility] Pul[facility] Critical Care Director Pulmonary Division Medical Director Respiratory Therapy [facility]

## 2024-07-13 ENCOUNTER — OUTPATIENT (OUTPATIENT)
Dept: OUTPATIENT SERVICES | Facility: HOSPITAL | Age: 79
LOS: 1 days | End: 2024-07-13

## 2024-07-13 ENCOUNTER — APPOINTMENT (OUTPATIENT)
Dept: CT IMAGING | Facility: CLINIC | Age: 79
End: 2024-07-13

## 2024-07-13 DIAGNOSIS — Z96.642 PRESENCE OF LEFT ARTIFICIAL HIP JOINT: Chronic | ICD-10-CM

## 2024-07-13 DIAGNOSIS — Z98.890 OTHER SPECIFIED POSTPROCEDURAL STATES: Chronic | ICD-10-CM

## 2024-07-13 DIAGNOSIS — D86.9 SARCOIDOSIS, UNSPECIFIED: ICD-10-CM

## 2024-07-13 PROCEDURE — 71250 CT THORAX DX C-: CPT | Mod: 26

## 2024-07-18 ENCOUNTER — NON-APPOINTMENT (OUTPATIENT)
Age: 79
End: 2024-07-18

## 2024-07-24 NOTE — ED ADULT NURSE NOTE - NS ED NURSE RECORD ANOTHER HT AND WT
Susan is a 28 year old who is being evaluated via a billable video visit.          Assessment & Plan     (F41.9) Chronic anxiety  (primary encounter diagnosis)  Comment: ongoing, only started the higher fluoxetine dose (30mg) last week so not enough time to assess improvement likely.  Plan: Adult Mental Health  Referral        Will continue with current dose for another two weeks. If no improvement, then she will contact me.  Asked her to see me--virtually or in person in 3 months, earlier if no improvement.    (M25.512,  G89.29) Chronic left shoulder pain  Comment: continues to have pain recently. No clear etiology. No ana numbness or weakness based on hx.  Plan: recommended sports medicine consultation---cervical impingement vs primary shoulder issue. She declined today. Will let me know if she changes her mind. I did suggest that she should not let this go much longer if sx are truly worsening.    The longitudinal plan of care for the diagnosis(es)/condition(s) as documented were addressed during this visit. Due to the added complexity in care, I will continue to support Susan in the subsequent management and with ongoing continuity of care.             Subjective   Susan is a 28 year old, presenting for the following health issues:  Follow Up (Anxiety)      7/24/2024     8:33 AM   Additional Questions   Roomed by Gillian RANDLE     History of Present Illness       Mental Health Follow-up:  Patient presents to follow-up on Depression & Anxiety.Patient's depression since last visit has been:  Medium  The patient is not having other symptoms associated with depression.  Patient's anxiety since last visit has been:  Medium  The patient is having other symptoms associated with anxiety.  Any significant life events: No  Patient is feeling anxious or having panic attacks.  Patient has no concerns about alcohol or drug use.    Reason for visit:  Arm pain and anxiety - physical and mental symptoms    She eats 2-3  servings of fruits and vegetables daily.She consumes 0 sweetened beverage(s) daily.She exercises with enough effort to increase her heart rate 60 or more minutes per day.  She exercises with enough effort to increase her heart rate 5 days per week. She is missing 1 dose(s) of medications per week.  She is not taking prescribed medications regularly due to side effects and remembering to take.                   Objective    Vitals - Patient Reported  Systolic (Patient Reported):  (na)      Vitals:  No vitals were obtained today due to virtual visit.    Physical Exam   GENERAL: alert and no distress  EYES: Eyes grossly normal to inspection.  No discharge or erythema, or obvious scleral/conjunctival abnormalities.  RESP: No audible wheeze, cough, or visible cyanosis.    SKIN: Visible skin clear. No significant rash, abnormal pigmentation or lesions.  NEURO: Cranial nerves grossly intact.  Mentation and speech appropriate for age.  PSYCH: Appropriate affect, tone, and pace of words          Video-Visit Details    Type of service:  Video Visit   Originating Location (pt. Location): Home    Distant Location (provider location):  On-site  Platform used for Video Visit: Loyd  Signed Electronically by: Wiley Sutton MD     Yes

## 2024-08-01 NOTE — ED ADULT TRIAGE NOTE - ACCOMPANIED BY
LAB REQUEST    Date: 2024 Regarding: Abel Flores  [address]  [place] [address]     MRN: [id number]     :  2003     Ordering Provider:  Ana Rosa Tabares MD                Diagnosis (ICD-10) Code:    Severe obesity (H) [E66.01]  - Primary          TEST:    Orders Placed This Encounter   Procedures    Comprehensive metabolic panel    Ferritin    Folate    Hemoglobin A1c    Iron & Iron Binding Capacity    Magnesium    Parathyroid Hormone Intact    Phosphorus    Vitamin A    Vitamin B1 whole blood    Vitamin B12    Vitamin B6    Vitamin D Deficiency    Zinc      REASON:  Monitor Therapy   DURATION:  Order for 1 time lab draw, order good for 1 year   SPECIAL INSTRUCTIONS:  None      Please fax results once available to ATTN: DR. TABARES at [phone]  If you or the family have questions or concerns regarding the above lab test request, please feel free to contact the RN Care Coordinator office by calling [phone].  Thank you for your assistance with Abel montoya care.    Sincerely,        Ana Rosa Tabares MD, MS    American Board of Obesity Medicine Diplomate  Department of Pediatrics  [facility] [phone]  [facility], [facility] [phone]         EMT/paramedic

## 2024-08-19 ENCOUNTER — APPOINTMENT (OUTPATIENT)
Dept: PULMONOLOGY | Facility: CLINIC | Age: 79
End: 2024-08-19
Payer: MEDICARE

## 2024-08-19 VITALS
SYSTOLIC BLOOD PRESSURE: 124 MMHG | OXYGEN SATURATION: 98 % | DIASTOLIC BLOOD PRESSURE: 68 MMHG | RESPIRATION RATE: 16 BRPM | HEART RATE: 70 BPM

## 2024-08-19 VITALS — BODY MASS INDEX: 27.47 KG/M2 | HEIGHT: 66.5 IN | WEIGHT: 173 LBS

## 2024-08-19 DIAGNOSIS — R07.9 CHEST PAIN, UNSPECIFIED: ICD-10-CM

## 2024-08-19 DIAGNOSIS — R06.09 OTHER FORMS OF DYSPNEA: ICD-10-CM

## 2024-08-19 DIAGNOSIS — J98.6 DISORDERS OF DIAPHRAGM: ICD-10-CM

## 2024-08-19 DIAGNOSIS — R91.8 OTHER NONSPECIFIC ABNORMAL FINDING OF LUNG FIELD: ICD-10-CM

## 2024-08-19 PROCEDURE — 99214 OFFICE O/P EST MOD 30 MIN: CPT

## 2024-08-19 PROCEDURE — G2211 COMPLEX E/M VISIT ADD ON: CPT

## 2024-08-19 NOTE — HISTORY OF PRESENT ILLNESS
[Never] : never [TextBox_4] : dyspnea improved no fever, chill, chest pain no sig sputum recent ER [facility] - dizziness MRI negative, given meclizine, now improving

## 2024-08-19 NOTE — CONSULT LETTER
[Dear  ___] : Dear  [unfilled], [Consult Letter:] : I had the pleasure of evaluating your patient, [unfilled]. [Please see my note below.] : Please see my note below. [Sincerely,] : Sincerely, [FreeTextEntry3] : Francisco [facility]ga DO [facility] Pul[facility] Critical Care Director Pulmonary Division Medical Director Respiratory Therapy [facility]  [DrDayo  ___] : Dr. WESLEY

## 2024-08-19 NOTE — DISCUSSION/SUMMARY
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

## 2024-08-19 NOTE — PROCEDURE
[FreeTextEntry1] : CT scan report [facility] 4/24 as above Sleep study- home was negative Spirometry 1/24 very mild restrictive impairment CT 7/24: no sig nodules, small cyst, mild elevated L ivy stable from 2019

## 2024-11-04 ENCOUNTER — EMERGENCY (EMERGENCY)
Facility: HOSPITAL | Age: 79
LOS: 1 days | Discharge: HOME | End: 2024-11-04
Attending: EMERGENCY MEDICINE
Payer: MEDICARE

## 2024-11-04 VITALS
OXYGEN SATURATION: 98 % | SYSTOLIC BLOOD PRESSURE: 168 MMHG | HEART RATE: 98 BPM | RESPIRATION RATE: 20 BRPM | TEMPERATURE: 98 F | WEIGHT: 177.91 LBS | DIASTOLIC BLOOD PRESSURE: 86 MMHG | HEIGHT: 67 IN

## 2024-11-04 VITALS
RESPIRATION RATE: 18 BRPM | OXYGEN SATURATION: 97 % | SYSTOLIC BLOOD PRESSURE: 160 MMHG | HEART RATE: 75 BPM | TEMPERATURE: 99 F | DIASTOLIC BLOOD PRESSURE: 80 MMHG

## 2024-11-04 DIAGNOSIS — Z96.642 PRESENCE OF LEFT ARTIFICIAL HIP JOINT: Chronic | ICD-10-CM

## 2024-11-04 DIAGNOSIS — Z98.890 OTHER SPECIFIED POSTPROCEDURAL STATES: Chronic | ICD-10-CM

## 2024-11-04 LAB
ALBUMIN SERPL ELPH-MCNC: 3.9 G/DL — SIGNIFICANT CHANGE UP (ref 3.3–5.2)
ALP SERPL-CCNC: 81 U/L — SIGNIFICANT CHANGE UP (ref 40–120)
ALT FLD-CCNC: 13 U/L — SIGNIFICANT CHANGE UP
ANION GAP SERPL CALC-SCNC: 11 MMOL/L — SIGNIFICANT CHANGE UP (ref 5–17)
AST SERPL-CCNC: 28 U/L — SIGNIFICANT CHANGE UP
BASOPHILS # BLD AUTO: 0.02 K/UL — SIGNIFICANT CHANGE UP (ref 0–0.2)
BASOPHILS NFR BLD AUTO: 0.4 % — SIGNIFICANT CHANGE UP (ref 0–2)
BILIRUB SERPL-MCNC: 0.4 MG/DL — SIGNIFICANT CHANGE UP (ref 0.4–2)
BUN SERPL-MCNC: 17.3 MG/DL — SIGNIFICANT CHANGE UP (ref 8–20)
CALCIUM SERPL-MCNC: 9.4 MG/DL — SIGNIFICANT CHANGE UP (ref 8.4–10.5)
CHLORIDE SERPL-SCNC: 102 MMOL/L — SIGNIFICANT CHANGE UP (ref 96–108)
CO2 SERPL-SCNC: 27 MMOL/L — SIGNIFICANT CHANGE UP (ref 22–29)
CREAT SERPL-MCNC: 0.89 MG/DL — SIGNIFICANT CHANGE UP (ref 0.5–1.3)
EGFR: 66 ML/MIN/1.73M2 — SIGNIFICANT CHANGE UP
EOSINOPHIL # BLD AUTO: 0.05 K/UL — SIGNIFICANT CHANGE UP (ref 0–0.5)
EOSINOPHIL NFR BLD AUTO: 1.1 % — SIGNIFICANT CHANGE UP (ref 0–6)
GLUCOSE SERPL-MCNC: 94 MG/DL — SIGNIFICANT CHANGE UP (ref 70–99)
HCT VFR BLD CALC: 37.3 % — SIGNIFICANT CHANGE UP (ref 34.5–45)
HGB BLD-MCNC: 11.5 G/DL — SIGNIFICANT CHANGE UP (ref 11.5–15.5)
IMM GRANULOCYTES NFR BLD AUTO: 0.7 % — SIGNIFICANT CHANGE UP (ref 0–0.9)
LYMPHOCYTES # BLD AUTO: 1.15 K/UL — SIGNIFICANT CHANGE UP (ref 1–3.3)
LYMPHOCYTES # BLD AUTO: 25.6 % — SIGNIFICANT CHANGE UP (ref 13–44)
MCHC RBC-ENTMCNC: 25.5 PG — LOW (ref 27–34)
MCHC RBC-ENTMCNC: 30.8 G/DL — LOW (ref 32–36)
MCV RBC AUTO: 82.7 FL — SIGNIFICANT CHANGE UP (ref 80–100)
MONOCYTES # BLD AUTO: 0.44 K/UL — SIGNIFICANT CHANGE UP (ref 0–0.9)
MONOCYTES NFR BLD AUTO: 9.8 % — SIGNIFICANT CHANGE UP (ref 2–14)
NEUTROPHILS # BLD AUTO: 2.81 K/UL — SIGNIFICANT CHANGE UP (ref 1.8–7.4)
NEUTROPHILS NFR BLD AUTO: 62.4 % — SIGNIFICANT CHANGE UP (ref 43–77)
PLATELET # BLD AUTO: 166 K/UL — SIGNIFICANT CHANGE UP (ref 150–400)
POTASSIUM SERPL-MCNC: 4.2 MMOL/L — SIGNIFICANT CHANGE UP (ref 3.5–5.3)
POTASSIUM SERPL-SCNC: 4.2 MMOL/L — SIGNIFICANT CHANGE UP (ref 3.5–5.3)
PROT SERPL-MCNC: 7.7 G/DL — SIGNIFICANT CHANGE UP (ref 6.6–8.7)
RBC # BLD: 4.51 M/UL — SIGNIFICANT CHANGE UP (ref 3.8–5.2)
RBC # FLD: 18.4 % — HIGH (ref 10.3–14.5)
SODIUM SERPL-SCNC: 139 MMOL/L — SIGNIFICANT CHANGE UP (ref 135–145)
WBC # BLD: 4.5 K/UL — SIGNIFICANT CHANGE UP (ref 3.8–10.5)
WBC # FLD AUTO: 4.5 K/UL — SIGNIFICANT CHANGE UP (ref 3.8–10.5)

## 2024-11-04 PROCEDURE — 84484 ASSAY OF TROPONIN QUANT: CPT

## 2024-11-04 PROCEDURE — 76856 US EXAM PELVIC COMPLETE: CPT | Mod: 26

## 2024-11-04 PROCEDURE — 82550 ASSAY OF CK (CPK): CPT

## 2024-11-04 PROCEDURE — 76856 US EXAM PELVIC COMPLETE: CPT

## 2024-11-04 PROCEDURE — 99285 EMERGENCY DEPT VISIT HI MDM: CPT

## 2024-11-04 PROCEDURE — 80053 COMPREHEN METABOLIC PANEL: CPT

## 2024-11-04 PROCEDURE — 93005 ELECTROCARDIOGRAM TRACING: CPT

## 2024-11-04 PROCEDURE — 96374 THER/PROPH/DIAG INJ IV PUSH: CPT

## 2024-11-04 PROCEDURE — 36415 COLL VENOUS BLD VENIPUNCTURE: CPT

## 2024-11-04 PROCEDURE — 99283 EMERGENCY DEPT VISIT LOW MDM: CPT

## 2024-11-04 PROCEDURE — 96375 TX/PRO/DX INJ NEW DRUG ADDON: CPT

## 2024-11-04 PROCEDURE — 93010 ELECTROCARDIOGRAM REPORT: CPT

## 2024-11-04 PROCEDURE — 99285 EMERGENCY DEPT VISIT HI MDM: CPT | Mod: 25

## 2024-11-04 PROCEDURE — 85025 COMPLETE CBC W/AUTO DIFF WBC: CPT

## 2024-11-04 RX ORDER — CLINDAMYCIN PHOSPHATE 150 MG/ML
600 VIAL (ML) INJECTION ONCE
Refills: 0 | Status: COMPLETED | OUTPATIENT
Start: 2024-11-04 | End: 2024-11-04

## 2024-11-04 RX ORDER — ACETAMINOPHEN 500 MG
1000 TABLET ORAL ONCE
Refills: 0 | Status: COMPLETED | OUTPATIENT
Start: 2024-11-04 | End: 2024-11-04

## 2024-11-04 RX ORDER — CLINDAMYCIN PHOSPHATE 150 MG/ML
300 VIAL (ML) INJECTION ONCE
Refills: 0 | Status: COMPLETED | OUTPATIENT
Start: 2024-11-04 | End: 2024-11-04

## 2024-11-04 RX ORDER — SODIUM CHLORIDE 9 MG/ML
3 INJECTION, SOLUTION INTRAMUSCULAR; INTRAVENOUS; SUBCUTANEOUS ONCE
Refills: 0 | Status: COMPLETED | OUTPATIENT
Start: 2024-11-04 | End: 2024-11-04

## 2024-11-04 RX ORDER — CLINDAMYCIN PHOSPHATE 150 MG/ML
1 VIAL (ML) INJECTION
Qty: 28 | Refills: 0
Start: 2024-11-04 | End: 2024-11-10

## 2024-11-04 RX ADMIN — Medication 400 MILLIGRAM(S): at 15:29

## 2024-11-04 RX ADMIN — Medication 300 MILLIGRAM(S): at 20:22

## 2024-11-04 RX ADMIN — SODIUM CHLORIDE 3 MILLILITER(S): 9 INJECTION, SOLUTION INTRAMUSCULAR; INTRAVENOUS; SUBCUTANEOUS at 15:31

## 2024-11-04 RX ADMIN — Medication 100 MILLIGRAM(S): at 16:16

## 2024-11-04 NOTE — ED ADULT NURSE NOTE - NS_NURSE_DISC_TEACHING_YN_ED_ALL_ED
Problem: Patient Care Overview  Goal: Plan of Care Review  Outcome: Ongoing (interventions implemented as appropriate)  Flowsheets (Taken 8/11/2020 2722)  Progress: no change  Plan of Care Reviewed With: patient; spouse  Outcome Summary: Direct admit, possible SBO. NPO except ice chips and sips with meds. IV in L hand, NS w/20K @ 100mL/hr. up ad blake. Potassium 2.7, tried IV potassium but pt c/o burning during administration, so I stopped the infusion. Will reevaluate in the morning after receiving fluids with K. CT scan of the abdomen to be performed in the am. VSS, will continue to monitor.      Yes

## 2024-11-04 NOTE — ED ADULT TRIAGE NOTE - CHIEF COMPLAINT QUOTE
Pt arrives to ED c/o lower abd pain started three days ago " I feel like my vagina is swollen too.. " Pt also endorsing L  sided underneath the rib cage pain - Hx of cardiac stents

## 2024-11-04 NOTE — ED ADULT NURSE NOTE - PRIMARY CARE PROVIDER
Patient Education     Gout    Gout is an inflammation of a joint due to a build-up of gout crystals in the joint fluid. This occurs when there is an excess of uric acid (a normal waste product) in the body. Uric acid builds up in the body when the kidneys are unable to filter enough of it from the blood. This may occur with age. It is also associated with kidney disease. Gout occurs more often in people with obesity, diabetes, high blood pressure, or high levels of fats in the blood. It may run in families. Gout tends to come and go. A flare up of gout is called an attack. Drinking alcohol or eating certain foods (such as shellfish or foods with additives such as high-fructose corn syrup) may increase uric acid levels in the blood and cause a gout attack.  During a gout attack, the affected joint may become a hot, red, swollen and painful. If you have had one attack of gout, you are likely to have another. An attack of gout can be treated with medicine. If these attacks become frequent, a daily medicine may be prescribed to help the kidneys remove uric acid from the body.  Home care  During a gout attack:  · Rest painful joints. If gout affects the joints of your foot or leg, you may want to use crutches for the first few days to keep from bearing weight on the affected joint.  · When sitting or lying down, raise the painful joint to a level higher than your heart.  · Apply an ice pack (ice cubes in a plastic bag wrapped in a thin towel) over the injured area for 20 minutes every 1 to 2 hours the first day for pain relief. Continue this 3 to 4 times a day for swelling and pain.  · Avoid alcohol and foods listed below (see Preventing attacks) during a gout attack. Drink extra fluid to help flush the uric acid through your kidneys.  · If you were prescribed a medicine to treat gout, take it as your healthcare provider has instructed. Don't skip doses.  · Take anti-inflammatory medicine as directed.   · If pain  medicines have been prescribed, take them exactly as directed.    Preventing attacks  · Minimize or avoid alcohol use. Excess alcohol intake can cause a gout attack.  · Limit these foods and beverages:  ? Organ meats, such as kidneys and liver  ? Certain seafoods (anchovies, sardines, shrimp, scallops, herring, mackerel)  ? Wild game, meat extracts and meat gravies  ? Foods and beverages sweetened with high-fructose corn syrup, such as sodas  · Eat a healthy diet including low-fat and nonfat dairy, whole grains, and vegetables.  · If you are overweight, talk to your healthcare provider about a weight reduction plan. Avoid fasting or extreme low calorie diets (less than 900 calories per day). This will increase uric acid levels in the body.  · If you have diabetes or high blood pressure, work with your doctor to manage these conditions.  · Protect the joint from injury. Trauma can trigger a gout attack.  Follow-up care  Follow up with your healthcare provider, or as advised.   When to seek medical advice  Call your healthcare provider if you have any of the following:  · Fever over 100.4°F (38.ºC) with worsening joint pain  · Increasing redness around the joint  · Pain developing in another joint  · Repeated vomiting, abdominal pain, or blood in the vomit or stool (black or red color)  Date Last Reviewed: 3/1/2017  © 4535-7662 [employer]. [address] [place] [address], [place] [address]. All rights reserved. This information is not intended as a substitute for professional medical care. Always follow your healthcare professional's instructions.            PCP

## 2024-11-04 NOTE — ED ADULT NURSE NOTE - NSICDXFAMILYHX_GEN_ALL_CORE_FT
Include Location In Plan?: No Detail Level: Generalized Detail Level: Detailed FAMILY HISTORY:  Father  Still living? No  Family history of cerebrovascular accident (CVA), Age at diagnosis: 61-70  Family history of myocardial infarction, Age at diagnosis: 61-70    Mother  Still living? No  Family history of schizophrenia, Age at diagnosis: Age Unknown    Grandparent  Still living? No  Family history of cardiac disorder, Age at diagnosis: Age Unknown

## 2024-11-04 NOTE — ED PROVIDER NOTE - CLINICAL SUMMARY MEDICAL DECISION MAKING FREE TEXT BOX
79-year-old female with history of hypertension, CAD and high cholesterol presents to the ED complaining of lower abdominal/pelvic pain which started 3 days ago.  Patient complaining of feeling like her vagina is swollen.  Patient denies any history of injury or trauma.  No associated fever, chills, nausea, vomiting, vaginal discharge or bleeding or urinary symptoms.    Patient also complaining of episode of left lateral chest pain upon entering ED.  Patient denies any associated shortness of breath, diaphoresis or syncope.   exam concerning for possible abscess.  Will check labs start IV antibiotics and check ultrasound to rule out collection.  EKG sinus with no acute changes

## 2024-11-04 NOTE — ED ADULT NURSE REASSESSMENT NOTE - NS ED NURSE REASSESS COMMENT FT1
assumed care of patient. patient is alert and orientated x4. respirations are even and non-labored. IV site d/c/i. patient pending gyn consult and urine specimen.

## 2024-11-04 NOTE — ED PROVIDER NOTE - CROS ED CARDIOVAS ALL NEG
Joyce Jaramillo is a 38 year old female presenting with pt here today as a consult from Mireya Osborn PA-C for GERD  >mvu  Mireya Osborn PA-C  Denies known Latex allergy or symptoms of Latex sensitivity.    Pt stated that she has issues with swallowing foods and liquids  Has throat burning bloating constipation and diarrhea has nausea and vomiting no blood seen in stool or emesis has seen mucus in stools      negative...

## 2024-11-04 NOTE — CONSULT NOTE ADULT - ASSESSMENT
A/P: RADHA RICCI is a 79y LMP age 51 who presents to ED for labial abscess that was initially increasing in size before decreasing today.    - Afebrile, no leukocytosis.  - 2x3cm labial abscess with spontaneous decrease in size and tenderness over the past day. Agree with ED's plan for antibiotics and conservative management (i.e. warm compresses, sitz baths)  - Patient to follow up with her PCP in 1 week   - To return to ED if fevers > 100.4F, or worsening in size or pain of abscess despite antibiotics.    D/w Dr. Mendez

## 2024-11-04 NOTE — ED PROVIDER NOTE - NSFOLLOWUPINSTRUCTIONS_ED_ALL_ED_FT
Clindamycin 300 mg 4 times daily for the next 7 days   warm compresses to area 20 minutes on every 3-4 hours while awake   Tylenol as needed for pain   follow-up with your family physician next 1 to 2 days   return sooner for any problems      Abscess    An abscess is an infected area that contains a collection of pus and debris. It can occur in almost any part of the body and occurs when the tissue gets infection. Symptoms include a painful mass that is red, warm, tender that might break open and HAVE drainage. If your health care provider gave you antibiotics make sure to take the full course and do not stop even if feeling better.     SEEK IMMEDIATE MEDICAL CARE IF YOU HAVE ANY OF THE FOLLOWING SYMPTOMS: chills, fever, muscle aches, or red streaking from the area.

## 2024-11-04 NOTE — ED ADULT NURSE NOTE - OBJECTIVE STATEMENT
pt complains of pain in swelling + pain to suprapubic area , denies fevers    denies dysuria , denies NVD

## 2024-11-04 NOTE — ED PROVIDER NOTE - PROGRESS NOTE DETAILS
Patient seen and evaluated by GYN and felt patient is stable for discharge on p.o. antibiotics with local care.  Instructions and precautions reviewed

## 2024-11-04 NOTE — ED PROVIDER NOTE - OBJECTIVE STATEMENT
Encourage patient to actively participate in 1-2 psych rehab groups daily to improve coping skills and insight.   79-year-old female with history of hypertension, CAD and high cholesterol presents to the ED complaining of lower abdominal/pelvic pain which started 3 days ago.  Patient complaining of feeling like her vagina is swollen.  Patient denies any history of injury or trauma.  No associated fever, chills, nausea, vomiting, vaginal discharge or bleeding or urinary symptoms.    Patient also complaining of episode of left lateral chest pain upon entering ED.  Patient denies any associated shortness of breath, diaphoresis or syncope

## 2024-11-04 NOTE — ED ADULT NURSE NOTE - IS THE PATIENT ABLE TO BE SCREENED?
She is here for follow-up on her right knee arthritis.  She continues to have right knee pain which is continuing to worsen over time.  She has difficulty with ambulation and activities of daily living.  She can only walk 4 blocks or so.  Three years ago she had arthroscopy and was noted have grade 4 changes of the patellofemoral and medial compartment at that time.      On exam today flexion is 110.  Extension is -5.  There is varus alignment.  Calf is nontender.  Perfusion is good.  Light touch is intact.      X-rays demonstrate bone-on-bone arthritic change of the medial and patellofemoral joint.    Impression  Right knee osteoarthritis   Morbid obesity     Plan   Options were discussed.  She has failed conservative management.  She would like to proceed with arthroplasty.  She wants to hold off until October.  He I recommend that we see her 3 months prior to the planned procedure to do a follow-up evaluation and schedule.       Yes

## 2024-11-04 NOTE — CONSULT NOTE ADULT - SUBJECTIVE AND OBJECTIVE BOX
RADHA RICCI is a 79y LMP age 51 who presents to ED for labial abscess that was initially increasing in size before decreasing today. Reports it is painful, but pain is overall improving. Denies fevers, chills, nausea, vomiting, dysuria, vaginal bleeding. Denies history of diabetes. Denies similar lesions in the past.    Outpatient GYN: None    Gyn: last gyn visit ~10 years ago - pap normal at that time  PMH: HTN, CAD, HLD, RA, sarcoidosis  PSH: foot and hand surgery, lung biopsy, hip replacement  Med: clopidogrel 75mg qd, meclizine 25mg TID prn, ASA qd, metoprolol 25mg qd, atorvastatin 40mg qhs, hydrochlorothiazide 25mg qd, amlodipine 10mg qd, valsartan 160mg BID  All: NKDA    Vitals:   T(C): 37 (11-04-24 @ 16:09), Max: 37 (11-04-24 @ 16:09)  HR: 77 (11-04-24 @ 16:09) (77 - 98)  BP: 145/59 (11-04-24 @ 16:09) (145/59 - 168/86)  RR: 20 (11-04-24 @ 16:09) (20 - 20)  SpO2: 99% (11-04-24 @ 16:09) (98% - 99%)    General: AAOx3, NAD  Abd: Soft, nontender, non distended; no rebound/guarding  Pelvic: 3x2cm tense, non fluctuant, area of induration over the right mons with 2cm surround erythema, mildly tender to palpation, no drainage from site    Labs:                        11.5   4.50  )-----------( 166      ( 04 Nov 2024 15:28 )             37.3     11-04    139  |  102  |  17.3  ----------------------------<  94  4.2   |  27.0  |  0.89    Ca    9.4      04 Nov 2024 15:28    TPro  7.7  /  Alb  3.9  /  TBili  0.4  /  DBili  x   /  AST  28  /  ALT  13  /  AlkPhos  81  11-04        Radiology:  < from: US Pelvis Complete (US Pelvis Complete .) (11.04.24 @ 17:16) >    INTERPRETATION:  CLINICAL INFORMATION: Suprapubic pain and swelling    COMPARISON: None available.    TECHNIQUE: Grayscale and color Doppler ultrasound of the or pubic region   was performed in the area of concern.      FINDINGS:  A complex collection measures 3.0 x 1.2 x 2.9 cm with surrounding   hyperemia.    IMPRESSION:  Suprapubic subcutaneous abscess.        --- End of Report ---    < end of copied text >

## 2024-11-04 NOTE — ED PROVIDER NOTE - PATIENT PORTAL LINK FT
You can access the FollowMyHealth Patient Portal offered by [facility] by registering at the following website: [email or web address]. By joining [facility]’s FollowMyHealth portal, you will also be able to view your health information using other applications (apps) compatible with our system.

## 2024-11-04 NOTE — CONSULT NOTE ADULT - ATTENDING COMMENTS
Addendum:    Subjective Hx, Physical Exam, Laboratory & Imaging results reviewed.  I agree with the Resident Physician's assessment and plan of care, as discussed above.  Call, follow up, and return to Hospital parameters reviewed at length.  She was given the opportunity to ask questions and all were addressed.     Talib Mendez MD (GYN Hospitalist On-Call)

## 2024-11-07 DIAGNOSIS — R10.30 LOWER ABDOMINAL PAIN, UNSPECIFIED: ICD-10-CM

## 2024-11-07 DIAGNOSIS — N76.0 ACUTE VAGINITIS: ICD-10-CM

## 2024-11-07 DIAGNOSIS — I25.10 ATHEROSCLEROTIC HEART DISEASE OF NATIVE CORONARY ARTERY WITHOUT ANGINA PECTORIS: ICD-10-CM

## 2024-11-07 DIAGNOSIS — I10 ESSENTIAL (PRIMARY) HYPERTENSION: ICD-10-CM

## 2024-11-26 ENCOUNTER — RESULT REVIEW (OUTPATIENT)
Age: 79
End: 2024-11-26

## 2024-11-26 ENCOUNTER — INPATIENT (INPATIENT)
Facility: HOSPITAL | Age: 79
LOS: 6 days | Discharge: HOME | DRG: 293 | End: 2024-12-03
Attending: FAMILY MEDICINE | Admitting: STUDENT IN AN ORGANIZED HEALTH CARE EDUCATION/TRAINING PROGRAM
Payer: MEDICARE

## 2024-11-26 VITALS
DIASTOLIC BLOOD PRESSURE: 98 MMHG | OXYGEN SATURATION: 97 % | RESPIRATION RATE: 20 BRPM | WEIGHT: 195.11 LBS | SYSTOLIC BLOOD PRESSURE: 180 MMHG | TEMPERATURE: 98 F | HEIGHT: 67 IN | HEART RATE: 82 BPM

## 2024-11-26 DIAGNOSIS — Z98.890 OTHER SPECIFIED POSTPROCEDURAL STATES: Chronic | ICD-10-CM

## 2024-11-26 DIAGNOSIS — I50.31 ACUTE DIASTOLIC (CONGESTIVE) HEART FAILURE: ICD-10-CM

## 2024-11-26 DIAGNOSIS — Z96.642 PRESENCE OF LEFT ARTIFICIAL HIP JOINT: Chronic | ICD-10-CM

## 2024-11-26 LAB
ALBUMIN SERPL ELPH-MCNC: 4 G/DL — SIGNIFICANT CHANGE UP (ref 3.3–5.2)
ALP SERPL-CCNC: 66 U/L — SIGNIFICANT CHANGE UP (ref 40–120)
ALT FLD-CCNC: 12 U/L — SIGNIFICANT CHANGE UP
ANION GAP SERPL CALC-SCNC: 13 MMOL/L — SIGNIFICANT CHANGE UP (ref 5–17)
ANISOCYTOSIS BLD QL: SLIGHT — SIGNIFICANT CHANGE UP
APTT BLD: 31.1 SEC — SIGNIFICANT CHANGE UP (ref 24.5–35.6)
AST SERPL-CCNC: 28 U/L — SIGNIFICANT CHANGE UP
BASOPHILS # BLD AUTO: 0 K/UL — SIGNIFICANT CHANGE UP (ref 0–0.2)
BASOPHILS NFR BLD AUTO: 0 % — SIGNIFICANT CHANGE UP (ref 0–2)
BILIRUB SERPL-MCNC: 0.3 MG/DL — LOW (ref 0.4–2)
BUN SERPL-MCNC: 15.4 MG/DL — SIGNIFICANT CHANGE UP (ref 8–20)
CALCIUM SERPL-MCNC: 9.2 MG/DL — SIGNIFICANT CHANGE UP (ref 8.4–10.5)
CHLORIDE SERPL-SCNC: 103 MMOL/L — SIGNIFICANT CHANGE UP (ref 96–108)
CO2 SERPL-SCNC: 25 MMOL/L — SIGNIFICANT CHANGE UP (ref 22–29)
CREAT SERPL-MCNC: 0.84 MG/DL — SIGNIFICANT CHANGE UP (ref 0.5–1.3)
EGFR: 71 ML/MIN/1.73M2 — SIGNIFICANT CHANGE UP
ELLIPTOCYTES BLD QL SMEAR: SLIGHT — SIGNIFICANT CHANGE UP
EOSINOPHIL # BLD AUTO: 0 K/UL — SIGNIFICANT CHANGE UP (ref 0–0.5)
EOSINOPHIL NFR BLD AUTO: 0 % — SIGNIFICANT CHANGE UP (ref 0–6)
GIANT PLATELETS BLD QL SMEAR: PRESENT — SIGNIFICANT CHANGE UP
GLUCOSE SERPL-MCNC: 95 MG/DL — SIGNIFICANT CHANGE UP (ref 70–99)
HCT VFR BLD CALC: 35.2 % — SIGNIFICANT CHANGE UP (ref 34.5–45)
HGB BLD-MCNC: 11.3 G/DL — LOW (ref 11.5–15.5)
HYPOCHROMIA BLD QL: SLIGHT — SIGNIFICANT CHANGE UP
INR BLD: 1.01 RATIO — SIGNIFICANT CHANGE UP (ref 0.85–1.16)
LYMPHOCYTES # BLD AUTO: 0.63 K/UL — LOW (ref 1–3.3)
LYMPHOCYTES # BLD AUTO: 26.5 % — SIGNIFICANT CHANGE UP (ref 13–44)
MANUAL SMEAR VERIFICATION: SIGNIFICANT CHANGE UP
MCHC RBC-ENTMCNC: 26.6 PG — LOW (ref 27–34)
MCHC RBC-ENTMCNC: 32.1 G/DL — SIGNIFICANT CHANGE UP (ref 32–36)
MCV RBC AUTO: 82.8 FL — SIGNIFICANT CHANGE UP (ref 80–100)
MONOCYTES # BLD AUTO: 0.06 K/UL — SIGNIFICANT CHANGE UP (ref 0–0.9)
MONOCYTES NFR BLD AUTO: 2.7 % — SIGNIFICANT CHANGE UP (ref 2–14)
NEUTROPHILS # BLD AUTO: 1.52 K/UL — LOW (ref 1.8–7.4)
NEUTROPHILS NFR BLD AUTO: 63.7 % — SIGNIFICANT CHANGE UP (ref 43–77)
NT-PROBNP SERPL-SCNC: 707 PG/ML — HIGH (ref 0–300)
OVALOCYTES BLD QL SMEAR: SIGNIFICANT CHANGE UP
PLAT MORPH BLD: NORMAL — SIGNIFICANT CHANGE UP
PLATELET # BLD AUTO: 177 K/UL — SIGNIFICANT CHANGE UP (ref 150–400)
POIKILOCYTOSIS BLD QL AUTO: SIGNIFICANT CHANGE UP
POLYCHROMASIA BLD QL SMEAR: SLIGHT — SIGNIFICANT CHANGE UP
POTASSIUM SERPL-MCNC: 3.9 MMOL/L — SIGNIFICANT CHANGE UP (ref 3.5–5.3)
POTASSIUM SERPL-SCNC: 3.9 MMOL/L — SIGNIFICANT CHANGE UP (ref 3.5–5.3)
PROT SERPL-MCNC: 7.5 G/DL — SIGNIFICANT CHANGE UP (ref 6.6–8.7)
PROTHROM AB SERPL-ACNC: 11.4 SEC — SIGNIFICANT CHANGE UP (ref 9.9–13.4)
RBC # BLD: 4.25 M/UL — SIGNIFICANT CHANGE UP (ref 3.8–5.2)
RBC # FLD: 17.5 % — HIGH (ref 10.3–14.5)
RBC BLD AUTO: ABNORMAL
SMUDGE CELLS # BLD: PRESENT — SIGNIFICANT CHANGE UP
SODIUM SERPL-SCNC: 141 MMOL/L — SIGNIFICANT CHANGE UP (ref 135–145)
TROPONIN T, HIGH SENSITIVITY RESULT: 24 NG/L — SIGNIFICANT CHANGE UP (ref 0–51)
TROPONIN T, HIGH SENSITIVITY RESULT: 25 NG/L — SIGNIFICANT CHANGE UP (ref 0–51)
VARIANT LYMPHS # BLD: 7.1 % — HIGH (ref 0–6)
WBC # BLD: 2.39 K/UL — LOW (ref 3.8–10.5)
WBC # FLD AUTO: 2.39 K/UL — LOW (ref 3.8–10.5)

## 2024-11-26 PROCEDURE — 99223 1ST HOSP IP/OBS HIGH 75: CPT

## 2024-11-26 PROCEDURE — 93970 EXTREMITY STUDY: CPT | Mod: 26

## 2024-11-26 PROCEDURE — 93306 TTE W/DOPPLER COMPLETE: CPT | Mod: 26

## 2024-11-26 PROCEDURE — 71045 X-RAY EXAM CHEST 1 VIEW: CPT | Mod: 26

## 2024-11-26 PROCEDURE — 36410 VNPNXR 3YR/> PHY/QHP DX/THER: CPT

## 2024-11-26 RX ORDER — FUROSEMIDE 40 MG/1
40 TABLET ORAL ONCE
Refills: 0 | Status: COMPLETED | OUTPATIENT
Start: 2024-11-26 | End: 2024-11-26

## 2024-11-26 RX ADMIN — FUROSEMIDE 40 MILLIGRAM(S): 40 TABLET ORAL at 13:48

## 2024-11-26 NOTE — CONSULT NOTE ADULT - PROBLEM SELECTOR RECOMMENDATION 9
-LE edema, no JVD  -Been off diuretics since 07/2024  -S/p IVP lasix 40mg  -CXR clear  -  -Trop x2 benign  -SGLT2 on DC  -TTE pending

## 2024-11-26 NOTE — ED ADULT TRIAGE NOTE - CHIEF COMPLAINT QUOTE
MCCOY and b/l le edema x few days. reports history of chf, was taken off "water pill" by her pcp few months ago as per pt.

## 2024-11-26 NOTE — ED PROVIDER NOTE - PHYSICAL EXAMINATION
· CONSTITUTIONAL: In no apparent distress.  · HEENMT: Airway patent, TM normal bilaterally, normal appearing mouth, nose, throat, neck supple with full range of motion, no cervical adenopathy.  · EYES: Pupils equal, round and reactive to light, Extra-ocular movement intact, eyes are clear b/l  · CARDIAC: Regular rate and rhythm, Heart sounds S1 S2 present, no murmurs, rubs or gallops  · RESPIRATORY: faint rales appreciated on L sound.  · GASTROINTESTINAL: Abdomen soft, non tender, non-distended, no rebound, no guarding and no masses. no hepatosplenomegaly.  · MUSCULOSKELETAL: Spine appears normal, movement of extremities grossly intact.  · NEUROLOGICAL: Alert and interactive, no focal deficits  · SKIN: No cyanosis, no pallor, no jaundice, no rash  · PSYCHIATRIC: Alert and oriented to person, place and time. Normal mood and affect, no apparent risk to self or others  · HEME LYMPH: No pallor, no cervical/supraclavicular/inguinal adenopathy.  No splenomegaly

## 2024-11-26 NOTE — ED ADULT NURSE NOTE - NSFALLRISKINTERV_ED_ALL_ED

## 2024-11-26 NOTE — ED ADULT NURSE NOTE - OBJECTIVE STATEMENT
Pt is AOX4 presenting to ED c/o SOB after walking into her house this morning. Pt has hx of HTN and 1 stent placed. Denies chest pain, N/V/D, lightheadedness, fevers/chills, cough, headache. Pt on CM and . NAD. Pt updated on the plan of care and verbalizes understanding.

## 2024-11-26 NOTE — CONSULT NOTE ADULT - ASSESSMENT
79y old  Female PMH HTN, HLD, CAD with LAD stent in 2021; SHIRA of ostial LM 10/2023, Sarcoidosis by Vats per pt hx. Presents with one year on chest pain and dyspnea on exertion.  Post SHIRA 10/2023 she still had symptoms of SOB up till now. Today it was raining out and she was rushing to get into a building and she became so dyspneic employees called EMS. In regards to the chest pain it is epigastric and occasional left upper chest wall pain, only occurs with maximal exertion. She also endorses stop taking HCTZ as instructed by her ?cardiologist and ran out of statin. She is not the best historian

## 2024-11-26 NOTE — ED PROVIDER NOTE - OBJECTIVE STATEMENT
80 y/o F with a PMHx of  HTN, Sarcoidosis, RA, CAD s/p stent, CHF presents with shortness of breath. States shortness of breath has been ongoing for a year, reports increasing MCCOY. States MCCOY improved with rest, but today MCCOY had not improved with rest. Reports increasing b/l leg swelling for the last 5 days. States was told to stop "water pill" five months ago by cardiology. Endorses dizziness, loss of balance, constipation. Denies chest pain, palpitations, nausea, vomiting, headache, vision changes, fall, diarrhea.     Cardiology: Dr. Pacheco 78 y/o F with a PMHx of  HTN, Sarcoidosis, RA, CAD s/p 4 stent, CHF presents with shortness of breath. States shortness of breath has been ongoing for a year, reports increasing MCCOY. States MCCOY improved with rest, but today MCCOY had not improved with rest. Reports increasing b/l leg swelling for the last 5 days. States was told to stop "water pill" five months ago by cardiology. Endorses dizziness, loss of balance, constipation, states is unable to lay flat for the last month.  Denies chest pain, palpitations, nausea, vomiting, headache, vision changes, fall, diarrhea.     Cardiology: Dr. Pacheco

## 2024-11-26 NOTE — CONSULT NOTE ADULT - SUBJECTIVE AND OBJECTIVE BOX
[facility]                                              CARDIOLOGY AT [facility]                                                   [address], [place] [address]                                             Telephone: [phone]. Fax:[phone]                                                       CARDIOLOGY CONSULTATION NOTE                                                                                             History obtained by: Patient and medical record  Community Cardiologist: Flaco Perez    obtained: Yes [  ] No [ x ]  Reason for Consultation: SOB  Available out pt records reviewed: Yes [ x] No [ ]    HPI:  Patient is a 79y old  Female PMH HTN, HLD, CAD with LAD stent in 2021; SHIRA of ostial LM 10/2023, Sarcoidosis by Vats per pt hx. Presents with one year on chest pain and dyspnea on exertion.  Post SHIRA 10/2023 she still had symptoms of SOB. Today it was raining out and she was rushing to get into a building and she became so dyspneic employees called EMS. In regards to the chest pain it is epigastric and occasional left upper chest wall pain, only occurs with maximal exertion.           CARDIAC TESTING   ECHO:    < from: TTE Echo Complete w/o Contrast w/ Doppler (10.11.21 @ 08:47) >   Summary     Mild to Moderate mitral regurgitation is present.   Minimally reduced left ventricular systolic function. Normal LV size&   systolic function.   Estimated left ventricular ejection fraction is 50 %.   Mild (1+) tricuspid valve regurgitation. Normal PA systolic pressures.    < end of copied text >  STRESS:    CATH:     < from: Cardiac Catheterization (10.17.23 @ 12:45) >  Conclusions:   Successful PTCA/SHIRA of ostial LM     Recommendations:   Aspirin and Plavix; Aggressive risk factor modification with diet,  exercise, and a goal LDL of less than 70.    < end of copied text >  ELECTROPHYSIOLOGY:     PAST MEDICAL HISTORY  Hypertension    COPD, mild    Sarcoidosis    Brain aneurysm    Chronic arthritis or osteoarthritis    Rheumatoid arthritis    Abnormal stress test    Primary hypertension    Pulmonary sarcoidosis    History of hand surgery    History of surgery on arm        PAST SURGICAL HISTORY  S/P hip replacement, left    COPD exacerbation    History of COPD    History of surgery on arm    History of hand surgery    History of breast biopsy    History of lung biopsy    History of foot surgery        SOCIAL HISTORY:  Denies smoking/alcohol/drugs  CIGARETTES:     ALCOHOL:  DRUGS:    FAMILY HISTORY:  Family history of myocardial infarction (Father)    Family history of cerebrovascular accident (CVA) (Father)    Family history of schizophrenia (Mother)    Family history of cardiac disorder (Grandparent)      Family History of Cardiovascular Disease:  Yes [ x] No [  ]  Coronary Artery Disease in first degree relative: Yes [  ] No [ x]  Sudden Cardiac Death in First degree relative: Yes [  ] No [ x]    HOME MEDICATIONS:  acetaminophen 325 mg oral tablet: 2 tab(s) orally every 6 hours As needed Mild Pain (1 - 3) (18 Oct 2023 02:09)  amLODIPine 10 mg oral tablet: 1 tab(s) orally once a day (17 Oct 2023 13:23)  hydroCHLOROthiazide 25 mg oral tablet: 1 tab(s) orally once a day (17 Oct 2023 13:23)  Multiple Vitamins oral tablet: 1 tab(s) orally once a day (17 Oct 2023 10:25)  valsartan 160 mg oral tablet: 1 tab(s) orally 2 times a day (17 Oct 2023 13:23)      CURRENT CARDIAC MEDICATIONS:      CURRENT OTHER MEDICATIONS:      ALLERGIES:   No Known Allergies      REVIEW OF SYMPTOMS:   CONSTITUTIONAL: No fever, no chills, no weight loss, no weight gain, no fatigue   ENMT:  No vertigo; No sinus or throat pain  NECK: No pain or stiffness  CARDIOVASCULAR: +hest pain, +yspnea, no syncope/presyncope, no palpitations, no dizziness, no Orthopnea, no Paroxsymal nocturnal dyspnea  RESPIRATORY: no Shortness of breath, no cough, no wheezing  : No dysuria, no hematuria   GI: No dark color stool, no nausea, no diarrhea, no constipation, no abdominal pain   NEURO: No headache, no slurred speech   MUSCULOSKELETAL: No joint pain or swelling; No muscle, back, or extremity pain  PSYCH: No agitation, no anxiety.    ALL OTHER REVIEW OF SYSTEMS ARE NEGATIVE.    VITAL SIGNS:  T(C): 36.6 (11-26-24 @ 15:52), Max: 36.7 (11-26-24 @ 10:12)  T(F): 97.9 (11-26-24 @ 15:52), Max: 98 (11-26-24 @ 10:12)  HR: 77 (11-26-24 @ 15:52) (77 - 82)  BP: 132/83 (11-26-24 @ 15:52) (132/83 - 180/98)  RR: 20 (11-26-24 @ 15:52) (20 - 20)  SpO2: 99% (11-26-24 @ 15:52) (97% - 99%)    INTAKE AND OUTPUT:       PHYSICAL EXAM:  Constitutional: Comfortable . No acute distress.   HEENT: Atraumatic and normocephalic , neck is supple . no JVD. No carotid bruit.  CNS: A&Ox3. No focal deficits.   Respiratory: CTAB, unlabored   Cardiovascular: RRR normal s1 s2. No murmur. No rubs or gallop.  Gastrointestinal: Soft, non-tender. +Bowel sounds.   Extremities: 2+ Peripheral Pulses, No clubbing, cyanosis, +alex  Psychiatric: Calm . no agitation.   Skin: Warm and dry, no ulcers on extremities     LABS:                            11.3   2.39  )-----------( 177      ( 26 Nov 2024 12:17 )             35.2     11-26    141  |  103  |  15.4  ----------------------------<  95  3.9   |  25.0  |  0.84    Ca    9.2      26 Nov 2024 12:17    TPro  7.5  /  Alb  4.0  /  TBili  0.3[L]  /  DBili  x   /  AST  28  /  ALT  12  /  AlkPhos  66  11-26    PT/INR - ( 26 Nov 2024 12:17 )   PT: 11.4 sec;   INR: 1.01 ratio         PTT - ( 26 Nov 2024 12:17 )  PTT:31.1 sec  Urinalysis Basic - ( 26 Nov 2024 12:17 )    Color: x / Appearance: x / SG: x / pH: x  Gluc: 95 mg/dL / Ketone: x  / Bili: x / Urobili: x   Blood: x / Protein: x / Nitrite: x   Leuk Esterase: x / RBC: x / WBC x   Sq Epi: x / Non Sq Epi: x / Bacteria: x                 ECG: SR  Prior ECG: Yes [ x] No [  ]    RADIOLOGY & ADDITIONAL STUDIES:    X-ray:     < from: Xray Chest 1 View- PORTABLE-Urgent (11.26.24 @ 12:48) >  IMPRESSION:  1. Clear lungs with no acute cardiopulmonary abnormalities.  2. No significant changes are seen compared to the prior exam, with   additional chronic findings as discussed above.    < end of copied text >

## 2024-11-26 NOTE — ED PROVIDER NOTE - ATTENDING CONTRIBUTION TO CARE
I have personally performed a history and physical examination of the patient and discussed management with the resident as well as the patient.  I reviewed the resident's note and agree with the documented findings and plan of care.  I have authored and modified critical sections of the Provider Note, including but not limited to HPI, Physical Exam and MDM.    78 y/o F with a PMHx of  HTN, Sarcoidosis, RA, CAD s/p 4 stent, CHF presents with shortness of breath. States shortness of breath has been ongoing for a year, reports increasing MCCOY. States MCCOY improved with rest, but today MCCOY had not improved with rest. Reports increasing b/l leg swelling for the last 5 days. States was told to stop "water pill" five months ago by cardiology.  Chronic orthopnea.  Independent review of EMR shows baseline EF 50 to 55% with severe left main disease prior to stenting in October 2024.  Independent review of EKG shows NSR without STEMI or T wave changes, 77 bpm, , QRS 86, QTc 425.  Rales at the bilateral bases.  Consider decompensated heart failure versus low suspicion ACS.  Will obtain CBC, CMP, TNI, CXR, TTE.  Consider diuresis.  Disposition pending results.

## 2024-11-26 NOTE — CHART NOTE - NSCHARTNOTEFT_GEN_A_CORE
Called by ER for admission for acute HF decompensation. Briefly ppt with HTN, HLD, CAD s/p PCI, reportedly also with diagnosis of Sarcoidosis at age 20 which [place] presents with the complaint of dyspnea. States that this am when she getting into her apartment complex quickly to get out of the rain she felt it more than her usual. Reports having baseline dyspnea on exertion for almost 1 year and her Cardiologist reportedly took her off her diuretics ~5 months ago. Also reports that she was told she had a LE DVT but states she never took a blood thinner, instead used dietary supplements and when reevaluated, was told that it was gone.   At time of evaluation she is able to lie completely flat without any dyspnea, lungs are CTA, O2S on RA 98%, b/l LE with +1 (mixed pitting and non pitting edema).   Trop (x2) neg, EKG NSR @77 bpm, telemetry (bedside monitor) normal rate and rhythm.   ER advised that at present, she is stable and could be considered for LE dopplers, possible TTE and discussion with Cardiology. Additionally advised to restart her home antiHTN agents   If pt deemed not ready for dsc by ER at this time, consider admitting to Observation unit (or virtual obs)  Please call medicine back for admission IF pt care meets inpt admission criteria or if further testing is indicted by cardiology which requires > tie allowed to monioitr under observation unit  d/w Hospital asoociate director

## 2024-11-26 NOTE — ED PROCEDURE NOTE - ATTENDING CONTRIBUTION TO CARE
I have personally performed a history and physical examination of the patient and discussed management with the resident as well as the patient.  I reviewed the resident's note and agree with the documented findings and plan of care.  I have authored and modified critical sections of the Provider Note, including but not limited to HPI, Physical Exam and MDM.    Uncomplicated US IV placement.

## 2024-11-26 NOTE — CONSULT NOTE ADULT - NSCONSULTADDITIONALINFOA_GEN_ALL_CORE
Verified medications with pharmacy   -Imdur 30mg  -Atorvastatin 40mg  -Plavix 75mg  -Valsartan 160mg  -Hydrochlorthiazide 25mg   -Norvasc 10mg    ARB, diuretic, and CCB have not been refilled since 07/2024

## 2024-11-27 ENCOUNTER — RESULT REVIEW (OUTPATIENT)
Age: 79
End: 2024-11-27

## 2024-11-27 DIAGNOSIS — I25.10 ATHEROSCLEROTIC HEART DISEASE OF NATIVE CORONARY ARTERY WITHOUT ANGINA PECTORIS: ICD-10-CM

## 2024-11-27 DIAGNOSIS — I50.9 HEART FAILURE, UNSPECIFIED: ICD-10-CM

## 2024-11-27 LAB
BASOPHILS # BLD AUTO: 0.02 K/UL — SIGNIFICANT CHANGE UP (ref 0–0.2)
BASOPHILS NFR BLD AUTO: 0.7 % — SIGNIFICANT CHANGE UP (ref 0–2)
EOSINOPHIL # BLD AUTO: 0.05 K/UL — SIGNIFICANT CHANGE UP (ref 0–0.5)
EOSINOPHIL NFR BLD AUTO: 1.8 % — SIGNIFICANT CHANGE UP (ref 0–6)
HCT VFR BLD CALC: 32.3 % — LOW (ref 34.5–45)
HGB BLD-MCNC: 10.4 G/DL — LOW (ref 11.5–15.5)
IMM GRANULOCYTES NFR BLD AUTO: 0 % — SIGNIFICANT CHANGE UP (ref 0–0.9)
IRON SATN MFR SERPL: 19 % — SIGNIFICANT CHANGE UP (ref 14–50)
IRON SATN MFR SERPL: 55 UG/DL — SIGNIFICANT CHANGE UP (ref 37–145)
LYMPHOCYTES # BLD AUTO: 1.2 K/UL — SIGNIFICANT CHANGE UP (ref 1–3.3)
LYMPHOCYTES # BLD AUTO: 42.1 % — SIGNIFICANT CHANGE UP (ref 13–44)
MCHC RBC-ENTMCNC: 26.4 PG — LOW (ref 27–34)
MCHC RBC-ENTMCNC: 32.2 G/DL — SIGNIFICANT CHANGE UP (ref 32–36)
MCV RBC AUTO: 82 FL — SIGNIFICANT CHANGE UP (ref 80–100)
MONOCYTES # BLD AUTO: 0.38 K/UL — SIGNIFICANT CHANGE UP (ref 0–0.9)
MONOCYTES NFR BLD AUTO: 13.3 % — SIGNIFICANT CHANGE UP (ref 2–14)
NEUTROPHILS # BLD AUTO: 1.2 K/UL — LOW (ref 1.8–7.4)
NEUTROPHILS NFR BLD AUTO: 42.1 % — LOW (ref 43–77)
PLATELET # BLD AUTO: 159 K/UL — SIGNIFICANT CHANGE UP (ref 150–400)
RBC # BLD: 3.94 M/UL — SIGNIFICANT CHANGE UP (ref 3.8–5.2)
RBC # FLD: 17.5 % — HIGH (ref 10.3–14.5)
TIBC SERPL-MCNC: 289 UG/DL — SIGNIFICANT CHANGE UP (ref 220–430)
TRANSFERRIN SERPL-MCNC: 202 MG/DL — SIGNIFICANT CHANGE UP (ref 192–382)
WBC # BLD: 2.85 K/UL — LOW (ref 3.8–10.5)
WBC # FLD AUTO: 2.85 K/UL — LOW (ref 3.8–10.5)

## 2024-11-27 PROCEDURE — 99222 1ST HOSP IP/OBS MODERATE 55: CPT

## 2024-11-27 PROCEDURE — 93018 CV STRESS TEST I&R ONLY: CPT

## 2024-11-27 PROCEDURE — 93016 CV STRESS TEST SUPVJ ONLY: CPT

## 2024-11-27 PROCEDURE — 78452 HT MUSCLE IMAGE SPECT MULT: CPT | Mod: 26

## 2024-11-27 PROCEDURE — 99233 SBSQ HOSP IP/OBS HIGH 50: CPT

## 2024-11-27 RX ORDER — ACETAMINOPHEN 500MG 500 MG/1
650 TABLET, COATED ORAL EVERY 6 HOURS
Refills: 0 | Status: DISCONTINUED | OUTPATIENT
Start: 2024-11-27 | End: 2024-12-03

## 2024-11-27 RX ORDER — ACETAMINOPHEN, DIPHENHYDRAMINE HCL, PHENYLEPHRINE HCL 325; 25; 5 MG/1; MG/1; MG/1
3 TABLET ORAL AT BEDTIME
Refills: 0 | Status: DISCONTINUED | OUTPATIENT
Start: 2024-11-27 | End: 2024-12-03

## 2024-11-27 RX ORDER — ISOSORBIDE MONONITRATE 10 MG
30 TABLET ORAL DAILY
Refills: 0 | Status: DISCONTINUED | OUTPATIENT
Start: 2024-11-27 | End: 2024-11-29

## 2024-11-27 RX ORDER — CLOPIDOGREL 75 MG/1
75 TABLET, FILM COATED ORAL DAILY
Refills: 0 | Status: DISCONTINUED | OUTPATIENT
Start: 2024-11-27 | End: 2024-12-03

## 2024-11-27 RX ORDER — ERGOCALCIFEROL (VITAMIN D2) 200 MCG/ML
50000 DROPS ORAL DAILY
Refills: 0 | Status: DISCONTINUED | OUTPATIENT
Start: 2024-11-27 | End: 2024-11-27

## 2024-11-27 RX ORDER — VALSARTAN 320 MG/1
160 TABLET ORAL DAILY
Refills: 0 | Status: DISCONTINUED | OUTPATIENT
Start: 2024-11-27 | End: 2024-11-30

## 2024-11-27 RX ORDER — MAGNESIUM, ALUMINUM HYDROXIDE 200-225/5
30 SUSPENSION, ORAL (FINAL DOSE FORM) ORAL EVERY 4 HOURS
Refills: 0 | Status: DISCONTINUED | OUTPATIENT
Start: 2024-11-27 | End: 2024-12-03

## 2024-11-27 RX ORDER — AMLODIPINE BESYLATE 10 MG/1
10 TABLET ORAL DAILY
Refills: 0 | Status: DISCONTINUED | OUTPATIENT
Start: 2024-11-27 | End: 2024-12-01

## 2024-11-27 RX ORDER — ONDANSETRON HYDROCHLORIDE 4 MG/1
4 TABLET, FILM COATED ORAL EVERY 8 HOURS
Refills: 0 | Status: DISCONTINUED | OUTPATIENT
Start: 2024-11-27 | End: 2024-12-03

## 2024-11-27 RX ORDER — MECLIZINE HCL 12.5 MG
25 TABLET ORAL THREE TIMES A DAY
Refills: 0 | Status: DISCONTINUED | OUTPATIENT
Start: 2024-11-27 | End: 2024-12-03

## 2024-11-27 RX ORDER — HEPARIN SODIUM,PORCINE 1000/ML
5000 VIAL (ML) INJECTION EVERY 12 HOURS
Refills: 0 | Status: DISCONTINUED | OUTPATIENT
Start: 2024-11-27 | End: 2024-12-03

## 2024-11-27 RX ADMIN — Medication 40 MILLIGRAM(S): at 21:51

## 2024-11-27 RX ADMIN — VALSARTAN 160 MILLIGRAM(S): 320 TABLET ORAL at 18:08

## 2024-11-27 RX ADMIN — Medication 30 MILLIGRAM(S): at 15:12

## 2024-11-27 RX ADMIN — CLOPIDOGREL 75 MILLIGRAM(S): 75 TABLET, FILM COATED ORAL at 15:13

## 2024-11-27 NOTE — ED CDU PROVIDER INITIAL DAY NOTE - CLINICAL SUMMARY MEDICAL DECISION MAKING FREE TEXT BOX
80 y/o female with pmhx HTN, Sarcoidosis, RA, CAD s/p 4 stent, CHF presents with shortness of breath x 1 year. ED chart reviewed, labs revealing WBC ~2.82. Trop 25 to 24. BNP ~707. Doppler revealing to DVT. Cardiology consulted and recommending TTE, pending TTE read 78 y/o female with pmhx HTN, Sarcoidosis, RA, CAD s/p 4 stent, CHF presents with shortness of breath x 1 year. ED chart reviewed, labs revealing WBC ~2.82. Trop 25 to 24. BNP ~707. Doppler revealing no DVT. Cardiology consulted and recommending TTE, pending TTE read

## 2024-11-27 NOTE — PROGRESS NOTE ADULT - PROBLEM SELECTOR PLAN 1
- S/p IV push lasix 40 mg x1.   - pt appears euvolemic   - has been off diuretics since July 2024   - CXR clear  - trop x2 25->24.  - proBNP 707  - GDMT: c/w HCTZ 25 mg PO daily, Imdur 30 mg PO daily   - TTE performed, awaiting results   - will need outpatient cardiologist follow up. - S/p IV push lasix 40 mg x1.   - pt appears euvolemic   - has been off diuretics since July 2024   - CXR clear  - trop x2 25->24.  - proBNP 707  - GDMT: c/w HCTZ 25 mg PO daily. Start valsartan 160 mg PO daily.   - SGLTi2 on d/c.    - TTE EF 45-50%. Basal and mid anterior septum is abnormal. Grade I DD. Normal RV size and function. PASP 27 mmHg.   - TTE 10/2021 EF 50%. Mild to moderate MR. Mild MR.   - Patient can either follow up with her outpatient cardiologist to schedule a stress test or stay until Friday for inpatient nuclear stress test.   - will need outpatient cardiologist follow up.

## 2024-11-27 NOTE — ED CDU PROVIDER INITIAL DAY NOTE - ATTENDING APP SHARED VISIT CONTRIBUTION OF CARE
78 y/o female with pmhx HTN, Sarcoidosis, RA, CAD s/p 4 stent, CHF presents with shortness of breath x 1 year. ED chart reviewed, labs revealing WBC ~2.82. Trop 25 to 24. BNP ~707. Doppler revealing no DVT. Cardiology consulted and recommending TTE, pending TTE read    I, Guicho Fitzgerald, performed the initial face to face bedside interview with this patient regarding history of present illness, review of symptoms and relevant past medical, social and family history.  I completed an independent physical examination.  I was the initial provider who evaluated this patient. I have signed out the follow up of any pending tests (i.e. labs, radiological studies) to the ACP.  I have communicated the patient’s plan of care and disposition with the ACP.

## 2024-11-27 NOTE — ED CDU PROVIDER SUBSEQUENT DAY NOTE - ATTENDING APP SHARED VISIT CONTRIBUTION OF CARE
Hodan ATTG See MDM I performed a history and physical exam of the patient and discussed their management with the Physician assistant reviewed the PAs note and agree with the documented findings and plan of care. My medical decision making and observations are found above.     pt pending echo pt feels better cp / sob free

## 2024-11-27 NOTE — ED CDU PROVIDER SUBSEQUENT DAY NOTE - CLINICAL SUMMARY MEDICAL DECISION MAKING FREE TEXT BOX
80 y/o female with pmhx HTN, Sarcoidosis, RA, CAD s/p 4 stent, CHF presents with shortness of breath x 1 year. Cardiology consulted and recommending TTE, pending TTE read

## 2024-11-27 NOTE — ED CDU PROVIDER DISPOSITION NOTE - CLINICAL COURSE
79-year-old female who 3 hypertension hyperlipidemia CAD with previous stents presented to the ED with dyspnea on exertion and chest pain placed in observation after initial ED evaluation under chest pain protocol with telemetry monitoring serial troponins and cardiology consultation with recommendations for echo and nuclear stress test.  Nuclear stress test reported as abnormal with plans for left heart cath on Friday.  Patient agreeable to plan of care at this time.  Patient to be admitted for further medical management workup.

## 2024-11-27 NOTE — ED CDU PROVIDER INITIAL DAY NOTE - OBJECTIVE STATEMENT
80 y/o female with pmhx HTN, Sarcoidosis, RA, CAD s/p 4 stent, CHF presents with shortness of breath x 1 year. She also states she noticed lower leg pain and swelling. She feels improved since being in the ED with IV lasix. She used to take diuretic via her cardiologist but then he discontinued it. Denies chest pain, nausea, vomiting, headache, vision changes, fall, diarrhea, redness, warmth

## 2024-11-27 NOTE — ED ADULT NURSE REASSESSMENT NOTE - NS ED NURSE REASSESS COMMENT FT1
Patient a&ox3, in no acute distress, respirations even and nonlabored, resting comfortably in bed. Remains on continuos cardiac monitoring nsr,  wdl on ra. Pending TTE Results & cards. Bed locked and in lowest position.
Patient alert and oriented x's 4. RA, no acute distress. No SOB noted or reported at this time. KARL performed awaiting request. Pending Cardio consult, Safety measures maintained, Hourly rounds done, will continue to monitor.
Patient rec'd from ER nurse, alert and oriented x's 4. RA, no acute distress. No SOB noted or reported at this time. KARL performed awaiting request. Pending Cardio consult, Safety measures maintained, Hourly rounds done, will continue to monitor.
Pt ok to go to ecco off CM as per MD Joiner verbal order.
Report taken from RN at 11:15. Pt resting in bed comfortably. Pt on CM and . NAD. RN unable to obtain IV access. MD Joiner notified.
report given to CDU HARMONY Umana. pt brought to cdu 4hall. Pt alert and oriented x4. RR even/unlabored. Remains on continuous cardiac monitoring nsr. Bed locked and in lowest position.
Breath sounds clear and equal bilaterally.

## 2024-11-27 NOTE — H&P ADULT - ASSESSMENT
Patient is a 78 yo F w/ PMH of HTN, CAD s/p SHIRA (10/23), HLD, Sarcoidosis presenting with chief complaint of chest pain and shortness of breath.    Chest pain, Hx of CAD s/p SHIRA  Acute CHF exacerbation  - Cardiology following  - TTE w/ LVEF of 45-50%, basal and mid anterior septum abnormal  - Trop 25-24  - ProBNP 707  - S/p IV Lasix  - Monitor I&Os, daily weights, fluid/salt restriction  - Telemetry  - NST + today  - Resume ARB, Imdur, Statin  - Pt states she was taken off Plavix in September and currently only taking Aspirin. Louis reviewed, patient was prescribed Plavix in September for 3 months  - Continue Plavix per Cardio recs  - SGLT2i on discharge  - C on Friday, NPO night prior    Hx of HTN - uncontrolled  - Resume home medications    HLD  - Continue Statin    DVT ppx: Heparin SQ

## 2024-11-27 NOTE — PROGRESS NOTE ADULT - PROBLEM SELECTOR PLAN 2
- CAD with LAD stent in 2021; SHIRA of ostial LM 10/2023  - pt has no complaints of chest pain, chest pressure and anginal equivalent symptoms.    - c/w Plavix 75 mg PO daily and atorvastatin 40 mg PO daily - CAD with LAD stent in 2021; SHIRA of ostial LM 10/2023  - pt has no complaints of chest pain, chest pressure and anginal equivalent symptoms.    - c/w Plavix 75 mg PO daily and atorvastatin 40 mg PO daily  - c/w Imdur 30 mg PO daily

## 2024-11-27 NOTE — PATIENT PROFILE ADULT - FALL HARM RISK - RISK INTERVENTIONS

## 2024-11-27 NOTE — PROGRESS NOTE ADULT - SUBJECTIVE AND OBJECTIVE BOX
[facility]                                                         CARDIOLOGY AT [place] [address]                                                         Telephone: [phone]. Fax:[phone]                                                                             PROGRESS NOTE    Reason for follow up: SOB   Update: Pt seen and examined at the bedside. Pt reports feeling better since arrival to the hospital.      Review of symptoms:   Cardiac:  No chest pain. No dyspnea. No palpitations.  Respiratory: no cough. No dyspnea  Gastrointestinal: No diarrhea. No abdominal pain. No bleeding.   Neuro: No focal neuro complaints.    Vitals:  T(C): 36.6 (11-27-24 @ 08:09), Max: 36.7 (11-26-24 @ 10:12)  HR: 56 (11-27-24 @ 08:09) (56 - 82)  BP: 138/85 (11-27-24 @ 08:09) (131/78 - 180/98)  RR: 18 (11-27-24 @ 08:09) (18 - 20)  SpO2: 100% (11-27-24 @ 08:09) (97% - 100%)  Wt(kg): --  I&O's Summary    Weight (kg): 88.5 (11-26 @ 10:12)    PHYSICAL EXAM:  Appearance: Comfortable. No acute distress  HEENT:  Atraumatic. Normocephalic.  Normal oral mucosa  Neurologic: A & O x 3, no gross focal deficits.  Cardiovascular: RRR S1 S2, No murmur, no rubs/gallops. No JVD  Respiratory: Lungs clear to auscultation, unlabored   Gastrointestinal:  Soft, Non-tender, + BS  Lower Extremities: 2+ Peripheral Pulses, No clubbing, cyanosis, or edema  Psychiatry: Patient is calm. No agitation.   Skin: warm and dry.    CURRENT CARDIAC MEDICATIONS:  hydrochlorothiazide 25 milliGRAM(s) Oral daily  isosorbide   mononitrate ER Tablet (IMDUR) 30 milliGRAM(s) Oral daily    CURRENT OTHER MEDICATIONS:  meclizine 25 milliGRAM(s) Oral three times a day PRN for dizziness  atorvastatin 40 milliGRAM(s) Oral at bedtime  clopidogrel Tablet 75 milliGRAM(s) Oral daily    LABS:	 	             10.4   2.85  )-----------( 159      ( 27 Nov 2024 00:37 )             32.3     11-26    141  |  103  |  15.4  ----------------------------<  95  3.9   |  25.0  |  0.84    Ca    9.2      26 Nov 2024 12:17    TPro  7.5  /  Alb  4.0  /  TBili  0.3[L]  /  DBili  x   /  AST  28  /  ALT  12  /  AlkPhos  66  11-26    PT/INR/PTT ( 26 Nov 2024 12:17 )                       :                       :      11.4         :       31.1                  .        .                   .              .           .       1.01        .                                       Lipid Profile:   HgA1c: 5.4%  TSH:     TELEMETRY: NSR/SB 50s-60s    ECG: NSR     DIAGNOSTIC TESTING:  [ x] Echocardiogram: < from: TTE Echo Complete w/o Contrast w/ Doppler (10.11.21 @ 08:47) >       Mild to Moderate mitral regurgitation is present.   Minimally reduced left ventricular systolic function. Normal LV size&   systolic function.   Estimated left ventricular ejection fraction is 50 %.   Mild (1+) tricuspid valve regurgitation. Normal PA systolic pressures.      < end of copied text >    [ ]  Catheterization:  [ ] Stress Test:    OTHER:

## 2024-11-27 NOTE — PATIENT PROFILE ADULT - FUNCTIONAL ASSESSMENT - DAILY ACTIVITY ASSESSMENT TYPE
[facility] ED  EMERGENCY DEPARTMENT ENCOUNTER      Pt Name: Shyam Mosley  MRN: [id number]  [place] 2005  Date of evaluation: 7/21/2022  Provider: Michelle Odell, [facility]       Chief Complaint   Patient presents with    Panic Attack     Per pt, she's been having these attacks for a while but the last 2 nights it's been getting worse. HISTORY OF PRESENT ILLNESS   (Location/Symptom, Timing/Onset, Context/Setting, Quality, Duration, Modifying Factors, Severity)  Note limiting factors. Shyam Mosley is a 13 y.o. female who presents to the emergency department after having a panic attack. Patient states that she has been having these panic attacks for a while but ever since her birthday on [birth year] she has been having these more frequently. Patient states that she is afraid of everything. When I asked her what in particular she is afraid of she tells me that she is afraid of dying. She states that she has been stressed out in her life lately but cannot tell me what from. 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 Patient is quite tearful on exam and appears sad and depressed. Mom states that she has not been sleeping the last couple days and has been taking 2 Benadryl's every night which did not help. Patient denies any suicidal or homicidal ideation. She states that friends and school have been going fine. She gets along with her mother really well and feels like talking to someone really helps her anxiety. She does go for counseling every so often and her last one was right before her birthday. She denies smoking cigarettes or drinking alcohol or using any illicit drugs. She is currently on her period as well.   Mom states that back in 2017 the patient lost her grandmother in a tragic hit-and-run motor vehicle accident. The patient's mom also went through a divorce in 2017. Mom states that they had once told her that electrolyte imbalances could trigger these panic attacks as well so she would like some blood work done. REVIEW OF SYSTEMS    (2-9 systems for level 4, 10 or more for level 5)     Review of Systems   Constitutional:  Negative for fatigue and fever. HENT:  Negative for congestion and sore throat. Eyes:  Negative for visual disturbance. Respiratory:  Negative for shortness of breath. Cardiovascular:  Negative for chest pain and palpitations. Gastrointestinal:  Negative for abdominal distention. Genitourinary:  Negative for dysuria. Musculoskeletal:  Negative for back pain. Skin:  Negative for rash. Psychiatric/Behavioral:  Positive for sleep disturbance. Negative for suicidal ideas. The patient is nervous/anxious. Except as noted above the remainder of the review of systems was reviewed and negative. PAST MEDICAL HISTORY     Past Medical History:   Diagnosis Date    Premature baby          SURGICAL HISTORY     History reviewed. No pertinent surgical history. CURRENT MEDICATIONS       Previous Medications    ACETAMINOPHEN (AMINOFEN) 325 MG TABLET    Take 1 tablet by mouth every 6 hours as needed for Pain    IBUPROFEN (IBU) 400 MG TABLET    Take 1 tablet by mouth every 8 hours as needed for Pain    OMEPRAZOLE (PRILOSEC) 20 MG DELAYED RELEASE CAPSULE    Take 20 mg by mouth daily       ALLERGIES     Patient has no known allergies. FAMILY HISTORY     History reviewed. No pertinent family history.        SOCIAL HISTORY       Social History     Socioeconomic History    Marital status: Single     Spouse name: None    Number of children: None    Years of education: None    Highest education level: None   Tobacco Use    Smoking status: Never    Smokeless tobacco: Never   Substance and Sexual Activity    Drug use: Not Currently     Types: Marijuana (Weed)           PHYSICAL EXAM    (up to 7 for level 4, 8 or more for level 5)     ED Triage Vitals [07/21/22 2348]   BP Temp Temp Source Heart Rate Resp SpO2 Height Weight   122/75 97.9 °F (36.6 °C) Tympanic 80 16 99 % -- --       Physical Exam  Constitutional:       General: She is not in acute distress. Appearance: Normal appearance. She is not toxic-appearing. Comments: Patient is nervous and very tearful and sad appearing on exam.  She is shaking from anxiety. HENT:      Head: Normocephalic and atraumatic. Mouth/Throat:      Mouth: Mucous membranes are moist.   Eyes:      Extraocular Movements: Extraocular movements intact. Pupils: Pupils are equal, round, and reactive to light. Cardiovascular:      Rate and Rhythm: Normal rate and regular rhythm. Pulses: Normal pulses. Heart sounds: Normal heart sounds. Pulmonary:      Effort: Pulmonary effort is normal.      Breath sounds: Normal breath sounds. Abdominal:      General: Abdomen is flat. Bowel sounds are normal.      Palpations: Abdomen is soft. Musculoskeletal:         General: Normal range of motion. Skin:     General: Skin is warm and dry. Capillary Refill: Capillary refill takes less than 2 seconds. Neurological:      General: No focal deficit present. Mental Status: She is alert and oriented to person, place, and time. Psychiatric:         Attention and Perception: She does not perceive auditory or visual hallucinations. Mood and Affect: Mood is anxious and depressed. Thought Content: Thought content does not include suicidal ideation. Thought content does not include suicidal plan.        DIAGNOSTIC RESULTS     RADIOLOGY:   Non-plain film images such as CT, Ultrasound and MRI are read by the radiologist. Plain radiographic images are visualized and preliminarily interpreted by the emergency physician with the below findings:    Interpretation per the Radiologist below, if available at the time of this note:    No orders to display       LABS:  Labs Reviewed   CBC WITH AUTO DIFFERENTIAL - Abnormal; Notable for the following components:       Result Value    Monocytes 10 (*)     All other components within normal limits   COMPREHENSIVE METABOLIC PANEL - Abnormal; Notable for the following components:    Glucose 104 (*)     Albumin 4.9 (*)     All other components within normal limits   URINALYSIS WITH MICROSCOPIC - Abnormal; Notable for the following components:    Specific Gravity, UA 1.025 (*)     Bacteria, UA TRACE (*)     Mucus, UA 1+ (*)     All other components within normal limits       All other labs were within normal range or not returned as of this dictation. EMERGENCY DEPARTMENT COURSE and DIFFERENTIAL DIAGNOSIS/MDM:   Vitals:    Vitals:    07/21/22 2348   BP: 122/75   Pulse: 80   Resp: 16   Temp: 97.9 °F (36.6 °C)   TempSrc: Tympanic   SpO2: 99%       REASSESSMENT     ED Course as of 07/22/22 0204   Fri Jul 22, 2022   0100 Results with patient. Blood work is normal.  Patient now complains of a headache and feeling sweaty. I offered her half a milligram of Ativan she still appears little shaky but she refuses it. She is agreeable to taking some Benadryl and Tylenol. [JI]      ED Course User Index  [JI] Nancy Valles DO       Patient is now comfortably sleeping in bed after the Benadryl and Tylenol. Advised mom to have a close follow-up with her counselor. She will call the counselor tomorrow to set up an appointment. She can continue taking the Benadryl at night to help her sleep. Mom and patient have no other concerns at this time. FINAL IMPRESSION      1.  Anxiety state          DISPOSITION/PLAN   DISPOSITION Decision To Discharge 07/22/2022 02:02:28 AM      PATIENT REFERRED TO:  Janel Nolasco  [address] [facility] [id number] [phone]    In 1 day      (Please note that portions of this note were completed with a voice recognition Admission

## 2024-11-27 NOTE — ED CDU PROVIDER INITIAL DAY NOTE - PHYSICAL EXAMINATION
Gen: No acute distress, non toxic female, speaking in full sentences   HEENT: NC/AT, Mucous membranes moist, Oropharynx without exudates, uvula midline  Eyes: pink conjunctivae, EOMI, PERRL  CV: RRR, nl s1/s2  Resp: CTAB, normal rate and effort  GI: Abdomen soft, NT, ND. No rebound, no guarding  Neuro: A&O x 3, sensorimotor intact without deficits   MSK: No spine or joint tenderness to palpation, Full ROM ext x 4  Skin: No rashes. intact and perfused, no overlying redness or warmth to the legs

## 2024-11-27 NOTE — H&P ADULT - HISTORY OF PRESENT ILLNESS
Patient is a 78 yo F w/ PMH of HTN, CAD s/p SHIRA (10/23), HLD, Sarcoidosis presenting with chief complaint of chest pain and shortness of breath.  Patient states she has been experiencing chest pain and MCCOY for the past year. She had a SHIRA in  10/2023 and she states she has been experiencing these symptoms since. Her symptoms worsened after she was rushing to get into a building and EMS was called. She was observed in the EDOU and Cardiology was consulted. NST done today was positive and patient being admitted for LHC on Friday. Patient denies fever, chills, palpitations, orthopnea, PND, cough, wheezing, abdominal pain, nausea, vomiting, diarrhea, constipation, bloody bowel movements, melena, hematemesis, urinary complaints, syncope, calf tenderness, paresthesias.

## 2024-11-27 NOTE — H&P ADULT - NSHPPHYSICALEXAM_GEN_ALL_CORE
Vital Signs Last 24 Hrs  T(F): 97.9 (27 Nov 2024 08:09), Max: 97.9 (26 Nov 2024 15:52)  HR: 56 (27 Nov 2024 08:09) (56 - 77)  BP: 138/85 (27 Nov 2024 08:09) (131/78 - 142/84)  RR: 18 (27 Nov 2024 08:09) (18 - 20)  SpO2: 100% (27 Nov 2024 08:09) (99% - 100%)    Physical Exam:  Constitutional: NAD  HEENT: NC/AT  Respiratory: CTA bilaterally, symmetrical chest rise  Cardiovascular: RRR, no m/g/r  Gastrointestinal: Soft, NT/ND, BS+  Vascular: 2+ peripheral pulses  Neurological: A/O x 3, no focal neurological deficits  Psych: Fair mood/affect  Musculoskeletal: No edema, cyanosis, deformities. ROM normal  Skin: No obvious rash, lesions. No jaundice.

## 2024-11-28 LAB
ALBUMIN SERPL ELPH-MCNC: 3.4 G/DL — SIGNIFICANT CHANGE UP (ref 3.3–5.2)
ALP SERPL-CCNC: 58 U/L — SIGNIFICANT CHANGE UP (ref 40–120)
ALT FLD-CCNC: 12 U/L — SIGNIFICANT CHANGE UP
ANION GAP SERPL CALC-SCNC: 12 MMOL/L — SIGNIFICANT CHANGE UP (ref 5–17)
AST SERPL-CCNC: 28 U/L — SIGNIFICANT CHANGE UP
BASOPHILS # BLD AUTO: 0.01 K/UL — SIGNIFICANT CHANGE UP (ref 0–0.2)
BASOPHILS NFR BLD AUTO: 0.4 % — SIGNIFICANT CHANGE UP (ref 0–2)
BILIRUB SERPL-MCNC: 0.4 MG/DL — SIGNIFICANT CHANGE UP (ref 0.4–2)
BUN SERPL-MCNC: 13.8 MG/DL — SIGNIFICANT CHANGE UP (ref 8–20)
CALCIUM SERPL-MCNC: 9.1 MG/DL — SIGNIFICANT CHANGE UP (ref 8.4–10.5)
CHLORIDE SERPL-SCNC: 103 MMOL/L — SIGNIFICANT CHANGE UP (ref 96–108)
CO2 SERPL-SCNC: 23 MMOL/L — SIGNIFICANT CHANGE UP (ref 22–29)
CREAT SERPL-MCNC: 0.79 MG/DL — SIGNIFICANT CHANGE UP (ref 0.5–1.3)
EGFR: 77 ML/MIN/1.73M2 — SIGNIFICANT CHANGE UP
EOSINOPHIL # BLD AUTO: 0.11 K/UL — SIGNIFICANT CHANGE UP (ref 0–0.5)
EOSINOPHIL NFR BLD AUTO: 4.5 % — SIGNIFICANT CHANGE UP (ref 0–6)
GLUCOSE SERPL-MCNC: 98 MG/DL — SIGNIFICANT CHANGE UP (ref 70–99)
HCT VFR BLD CALC: 32.9 % — LOW (ref 34.5–45)
HGB BLD-MCNC: 10.5 G/DL — LOW (ref 11.5–15.5)
IMM GRANULOCYTES NFR BLD AUTO: 0 % — SIGNIFICANT CHANGE UP (ref 0–0.9)
LYMPHOCYTES # BLD AUTO: 0.9 K/UL — LOW (ref 1–3.3)
LYMPHOCYTES # BLD AUTO: 37 % — SIGNIFICANT CHANGE UP (ref 13–44)
MCHC RBC-ENTMCNC: 26.9 PG — LOW (ref 27–34)
MCHC RBC-ENTMCNC: 31.9 G/DL — LOW (ref 32–36)
MCV RBC AUTO: 84.4 FL — SIGNIFICANT CHANGE UP (ref 80–100)
MONOCYTES # BLD AUTO: 0.34 K/UL — SIGNIFICANT CHANGE UP (ref 0–0.9)
MONOCYTES NFR BLD AUTO: 14 % — SIGNIFICANT CHANGE UP (ref 2–14)
NEUTROPHILS # BLD AUTO: 1.07 K/UL — LOW (ref 1.8–7.4)
NEUTROPHILS NFR BLD AUTO: 44.1 % — SIGNIFICANT CHANGE UP (ref 43–77)
PLATELET # BLD AUTO: 160 K/UL — SIGNIFICANT CHANGE UP (ref 150–400)
POTASSIUM SERPL-MCNC: 3.7 MMOL/L — SIGNIFICANT CHANGE UP (ref 3.5–5.3)
POTASSIUM SERPL-SCNC: 3.7 MMOL/L — SIGNIFICANT CHANGE UP (ref 3.5–5.3)
PROT SERPL-MCNC: 6.6 G/DL — SIGNIFICANT CHANGE UP (ref 6.6–8.7)
RBC # BLD: 3.9 M/UL — SIGNIFICANT CHANGE UP (ref 3.8–5.2)
RBC # FLD: 17.7 % — HIGH (ref 10.3–14.5)
SODIUM SERPL-SCNC: 138 MMOL/L — SIGNIFICANT CHANGE UP (ref 135–145)
WBC # BLD: 2.43 K/UL — LOW (ref 3.8–10.5)
WBC # FLD AUTO: 2.43 K/UL — LOW (ref 3.8–10.5)

## 2024-11-28 PROCEDURE — 99232 SBSQ HOSP IP/OBS MODERATE 35: CPT

## 2024-11-28 PROCEDURE — 99232 SBSQ HOSP IP/OBS MODERATE 35: CPT | Mod: 25

## 2024-11-28 PROCEDURE — 93010 ELECTROCARDIOGRAM REPORT: CPT

## 2024-11-28 RX ADMIN — CLOPIDOGREL 75 MILLIGRAM(S): 75 TABLET, FILM COATED ORAL at 11:23

## 2024-11-28 RX ADMIN — Medication 5000 UNIT(S): at 06:28

## 2024-11-28 RX ADMIN — AMLODIPINE BESYLATE 10 MILLIGRAM(S): 10 TABLET ORAL at 06:27

## 2024-11-28 RX ADMIN — VALSARTAN 160 MILLIGRAM(S): 320 TABLET ORAL at 07:23

## 2024-11-28 RX ADMIN — Medication 30 MILLIGRAM(S): at 11:23

## 2024-11-28 RX ADMIN — Medication 40 MILLIGRAM(S): at 21:29

## 2024-11-28 RX ADMIN — Medication 5000 UNIT(S): at 17:07

## 2024-11-28 NOTE — PROGRESS NOTE ADULT - PROBLEM SELECTOR PLAN 2
- CAD with LAD stent in 2021; SHIRA of ostial LM 10/2023  - pt has no complaints of chest pain, chest pressure and anginal equivalent symptoms.    - c/w Plavix 75 mg PO daily and atorvastatin 40 mg PO daily  - c/w Imdur 30 mg PO daily  - NPO after midnight for [facility] tomorrow, 11/29.

## 2024-11-28 NOTE — PROGRESS NOTE ADULT - PROBLEM SELECTOR PLAN 1
[facility] Dermatology Note      Dermatology Problem List:  1. Cyst, right upper lip   - clinical monitoring    Encounter Date: Nov 27, 2019    CC:  Chief Complaint   Patient presents with     Derm Problem     Vlad states he had a bump on his lip, he notes the area has gotten smaller.      History of Present Illness:  Mr. Vlad Rolle is a 21 year old male who presents today in referral from Dr. Malissa MIR for a cyst evaluation.     Today he reports a cyst on his upper lip for the last 5-6 months. He was recently seen at [facility] for this lesion, who referred him here for further removal. This lesion has significantly decreased in size since this visit. He is only able to see the small white spot if he manipulates the area. Denies popping the area. He has been trying to avoid touching the area.  Applied a baking soda/water mixture to this area daily after his [place] visit.       Otherwise he is feeling well, without additional skin concerns.     Past Medical History:   There is no problem list on file for this patient.    No past medical history on file.  No past surgical history on file.    Social History:  Patient    He is a student at the [employer], studying economics     Family History:  No family history on file.   His sister has a history of skin cancer, likely NMSC    Medications:  No current outpatient medications on file.       Allergies   Allergen Reactions     Penicillins Hives       Review of Systems:  -Constitutional: Otherwise feeling well today, in usual state of health.  -HEENT: Patient denies nonhealing oral sores.  -Skin: As above in HPI. No additional skin concerns.    Physical exam:  Vitals: There were no vitals taken for this visit.  GEN: This is a well developed, well-nourished male in no acute distress, in a pleasant mood.    SKIN: Focused examination of the face, neck and hands was performed. Significant for:   - There is a barely visible 1-2 mm skin  colored papule, with faint evidence of central comedone on the right upper lip on the [place] border  -There are a few white 1 mm papules on the upper lips.  -No other lesions of concern on areas examined.       Impression/Plan:  1. Cyst, right upper lip  - Cyst management options were reviewed. The patient elects for clinical monitoring.  - Return to clinic if lesion flares, and becomes bothersome- will consider ILK injections or oral antibiotics    2. Scarlet spots, upper lip, reassurance given.      [place] [address] on close of this encounter.  Follow-up PRN     Staff Involved:  Scribe/Staff    Scribe Disclosure:   I, Any Bianchi, am serving as a scribe to document services personally performed by Heidi Herrera PA-C, based on data collection and the provider's statements to me.      Provider Disclosure:   The documentation recorded by the scribe accurately reflects the services I personally performed and the decisions made by me.    All risks, benefits and alternatives were discussed with patient.  Patient is in agreement and understands the assessment and plan.  All questions were answered.  Sun Screen Education was given.   Return to Clinic as needed.   Heidi Herrera PA-C   [facility]      - S/p IV push lasix 40 mg x1.   - pt appears euvolemic   - has been off diuretics since July 2024   - CXR clear  - trop x2 25->24.  - proBNP 707  - GDMT: c/w HCTZ 25 mg PO daily and  valsartan 160 mg po daily.    - SGLTi2 on d/c.    - TTE EF 45-50%. Basal and mid anterior septum is abnormal. Grade I DD. Normal RV size and function. PASP 27 mmHg.   - TTE 10/2021 EF 50%. Mild to moderate MR. Mild MR.   - NST 11/27 is abnormal.   - Keep NPO after midnight tonight for [facility] tomorrow, 11/29.   - will need outpatient cardiologist follow up.

## 2024-11-28 NOTE — PROGRESS NOTE ADULT - SUBJECTIVE AND OBJECTIVE BOX
Farideh Stearns M.D.    Patient is a 79y old  Female who presents with a chief complaint of Chest pain (28 Nov 2024 09:31)      SUBJECTIVE / OVERNIGHT EVENTS: no concerns. CP resolved.     Patient denies chest pain, SOB, abd pain, N/V, fever, chills, dysuria or any other complaints. All remainder ROS negative.     MEDICATIONS  (STANDING):  amLODIPine   Tablet 10 milliGRAM(s) Oral daily  atorvastatin 40 milliGRAM(s) Oral at bedtime  clopidogrel Tablet 75 milliGRAM(s) Oral daily  heparin   Injectable 5000 Unit(s) SubCutaneous every 12 hours  hydrochlorothiazide 25 milliGRAM(s) Oral daily  isosorbide   mononitrate ER Tablet (IMDUR) 30 milliGRAM(s) Oral daily  valsartan 160 milliGRAM(s) Oral daily    MEDICATIONS  (PRN):  acetaminophen     Tablet .. 650 milliGRAM(s) Oral every 6 hours PRN Temp greater or equal to 38C (100.4F), Mild Pain (1 - 3)  aluminum hydroxide/magnesium hydroxide/simethicone Suspension 30 milliLiter(s) Oral every 4 hours PRN Dyspepsia  meclizine 25 milliGRAM(s) Oral three times a day PRN for dizziness  melatonin 3 milliGRAM(s) Oral at bedtime PRN Insomnia  ondansetron Injectable 4 milliGRAM(s) IV Push every 8 hours PRN Nausea and/or Vomiting      I&O's Summary    27 Nov 2024 07:01  -  28 Nov 2024 07:00  --------------------------------------------------------  IN: 500 mL / OUT: 0 mL / NET: 500 mL    28 Nov 2024 07:01  -  28 Nov 2024 11:18  --------------------------------------------------------  IN: 210 mL / OUT: 0 mL / NET: 210 mL        PHYSICAL EXAM:  Vital Signs Last 24 Hrs  T(C): 36.4 (28 Nov 2024 07:51), Max: 36.5 (27 Nov 2024 16:06)  T(F): 97.6 (28 Nov 2024 07:51), Max: 97.7 (27 Nov 2024 16:06)  HR: 56 (28 Nov 2024 07:51) (56 - 80)  BP: 138/74 (28 Nov 2024 07:51) (134/77 - 168/94)  BP(mean): --  RR: 18 (28 Nov 2024 07:51) (18 - 18)  SpO2: 97% (28 Nov 2024 07:51) (96% - 100%)    Parameters below as of 28 Nov 2024 07:51  Patient On (Oxygen Delivery Method): room air    CONSTITUTIONAL: NAD, well-groomed  RESPIRATORY: Normal respiratory effort; lungs are clear to auscultation bilaterally  CARDIOVASCULAR: Regular rate and rhythm, no LE edema  ABDOMEN: Nontender to palpation, normoactive bowel sounds  PSYCH: A+O x3; affect appropriate  NEUROLOGY: CN 2-12 are intact and symmetric; no gross sensory deficits;   SKIN: No rashes; no palpable lesions    LABS:                        10.5   2.43  )-----------( 160      ( 28 Nov 2024 05:43 )             32.9     11-28    138  |  103  |  13.8  ----------------------------<  98  3.7   |  23.0  |  0.79    Ca    9.1      28 Nov 2024 05:43    TPro  6.6  /  Alb  3.4  /  TBili  0.4  /  DBili  x   /  AST  28  /  ALT  12  /  AlkPhos  58  11-28    PT/INR - ( 26 Nov 2024 12:17 )   PT: 11.4 sec;   INR: 1.01 ratio         PTT - ( 26 Nov 2024 12:17 )  PTT:31.1 sec      Urinalysis Basic - ( 28 Nov 2024 05:43 )    Color: x / Appearance: x / SG: x / pH: x  Gluc: 98 mg/dL / Ketone: x  / Bili: x / Urobili: x   Blood: x / Protein: x / Nitrite: x   Leuk Esterase: x / RBC: x / WBC x   Sq Epi: x / Non Sq Epi: x / Bacteria: x        CAPILLARY BLOOD GLUCOSE          RADIOLOGY & ADDITIONAL TESTS:  Results Reviewed:   Imaging Personally Reviewed:  Electrocardiogram Personally Reviewed:

## 2024-11-28 NOTE — PROGRESS NOTE ADULT - SUBJECTIVE AND OBJECTIVE BOX
[facility]                                                         CARDIOLOGY AT [facility]                                                                  [address], [place] [address]                                                         Telephone: [phone]. Fax:[phone]                                                                             PROGRESS NOTE    Reason for follow up: Abnormal NST   Update: Pt seen and examined at the bedside. NST abnormal. Pt reports SOB improving after the lasix.     Review of symptoms:   Cardiac:  No chest pain. No dyspnea. No palpitations.  Respiratory: no cough. No dyspnea  Gastrointestinal: No diarrhea. No abdominal pain. No bleeding.   Neuro: No focal neuro complaints.    Vitals:  T(C): 36.4 (11-28-24 @ 07:51), Max: 36.5 (11-27-24 @ 16:06)  HR: 56 (11-28-24 @ 07:51) (56 - 80)  BP: 138/74 (11-28-24 @ 07:51) (134/77 - 168/94)  RR: 18 (11-28-24 @ 07:51) (18 - 18)  SpO2: 97% (11-28-24 @ 07:51) (96% - 100%)  Wt(kg): --  I&O's Summary    27 Nov 2024 07:01  -  28 Nov 2024 07:00  --------------------------------------------------------  IN: 500 mL / OUT: 0 mL / NET: 500 mL    Weight (kg): 88.5 (11-26 @ 10:12)    PHYSICAL EXAM:  Appearance: Comfortable. No acute distress  HEENT:  Atraumatic. Normocephalic.  Normal oral mucosa  Neurologic: A & O x 3, no gross focal deficits.  Cardiovascular: RRR S1 S2, No murmur, no rubs/gallops. No JVD  Respiratory: Lungs clear to auscultation, unlabored   Gastrointestinal:  Soft, Non-tender, + BS  Lower Extremities: 2+ Peripheral Pulses, No clubbing, cyanosis, or edema  Psychiatry: Patient is calm. No agitation.   Skin: warm and dry.    CURRENT CARDIAC MEDICATIONS:  amLODIPine   Tablet 10 milliGRAM(s) Oral daily  hydrochlorothiazide 25 milliGRAM(s) Oral daily  isosorbide   mononitrate ER Tablet (IMDUR) 30 milliGRAM(s) Oral daily  valsartan 160 milliGRAM(s) Oral daily    CURRENT OTHER MEDICATIONS:  acetaminophen     Tablet .. 650 milliGRAM(s) Oral every 6 hours PRN Temp greater or equal to 38C (100.4F), Mild Pain (1 - 3)  meclizine 25 milliGRAM(s) Oral three times a day PRN for dizziness  melatonin 3 milliGRAM(s) Oral at bedtime PRN Insomnia  ondansetron Injectable 4 milliGRAM(s) IV Push every 8 hours PRN Nausea and/or Vomiting  aluminum hydroxide/magnesium hydroxide/simethicone Suspension 30 milliLiter(s) Oral every 4 hours PRN Dyspepsia  atorvastatin 40 milliGRAM(s) Oral at bedtime  clopidogrel Tablet 75 milliGRAM(s) Oral daily  heparin   Injectable 5000 Unit(s) SubCutaneous every 12 hours    LABS:	 	             10.5   2.43  )-----------( 160      ( 28 Nov 2024 05:43 )             32.9     11-28    138  |  103  |  13.8  ----------------------------<  98  3.7   |  23.0  |  0.79    Ca    9.1      28 Nov 2024 05:43    TPro  6.6  /  Alb  3.4  /  TBili  0.4  /  DBili  x   /  AST  28  /  ALT  12  /  AlkPhos  58  11-28    PT/INR/PTT ( 26 Nov 2024 12:17 )                       :                       :      11.4         :       31.1                  .        .                   .              .           .       1.01        .                                       Lipid Profile:   HgA1c: 5.4%   TSH:     TELEMETRY: NSR/ST 90-low 100s   ECG: NSR     DIAGNOSTIC TESTING:  [x ] Echocardiogram: < from: TTE W or WO Ultrasound Enhancing Agent (11.26.24 @ 21:21) >  1. Left ventricular systolic function is mildly decreased with an ejection fraction visually estimated at 45 to 50 %.   2. Basal and mid anterior septum is abnormal.   3. There is mild (grade 1) left ventricular diastolic dysfunction.   4. Normal right ventricular cavity size and probably normal right ventricular systolic function.   5. Estimated pulmonary artery systolic pressure is 27 mmHg.   6. Normal left and right atrial size.   7. No pericardial effusion seen.   8. No prior echocardiogram is available for comparison.      < end of copied text >    [ ]  Catheterization:  [ ] Stress Test:    OTHER:

## 2024-11-28 NOTE — PROGRESS NOTE ADULT - ASSESSMENT
Patient is a 80 yo F w/ PMH of HTN, CAD s/p SHIRA (10/23), HLD, Sarcoidosis presenting with chief complaint of chest pain and shortness of breath.    Chest pain, Hx of CAD s/p SHIRA  Acute CHF exacerbation - now euvolemic on exam   - Cardiology following  - TTE w/ LVEF of 45-50%, basal and mid anterior septum abnormal  - Trop 25-24  - ProBNP 707  - S/p IV Lasix  - Monitor I&Os, daily weights, fluid/salt restriction  - Telemetry  - NST +   - Resume ARB, Imdur, Statin  - Continue Plavix per Cardio recs  - SGLT2i on discharge  - LHC on Friday, NPO mn    Hx of HTN - uncontrolled  - Resume home medications    HLD  - Continue Statin    DVT ppx: Heparin SQ  Dispo: pending [facility]

## 2024-11-28 NOTE — PROGRESS NOTE ADULT - ASSESSMENT
79y old  Female PMH HTN, HLD, CAD with LAD stent in 2021; SHIRA of ostial LM 10/2023, Sarcoidosis by Vats per pt hx. Presents with one year on chest pain and dyspnea on exertion.  Post SHIRA 10/2023 she still had symptoms of SOB up till now. Today it was raining out and she was rushing to get into a building and she became so dyspneic employees called EMS. In regards to the chest pain it is epigastric and occasional left upper chest wall pain, only occurs with maximal exertion. She also endorses stop taking HCTZ as instructed by her ?cardiologist and ran out of statin. She is not the best historian. Echo revealed mild reduction in LVEF. NST was abnormal. Pt is pending [facility] on 11/29.

## 2024-11-29 ENCOUNTER — TRANSCRIPTION ENCOUNTER (OUTPATIENT)
Age: 79
End: 2024-11-29

## 2024-11-29 LAB
ALBUMIN SERPL ELPH-MCNC: 3.6 G/DL — SIGNIFICANT CHANGE UP (ref 3.3–5.2)
ALP SERPL-CCNC: 65 U/L — SIGNIFICANT CHANGE UP (ref 40–120)
ALT FLD-CCNC: 11 U/L — SIGNIFICANT CHANGE UP
ANION GAP SERPL CALC-SCNC: 11 MMOL/L — SIGNIFICANT CHANGE UP (ref 5–17)
ANION GAP SERPL CALC-SCNC: 14 MMOL/L — SIGNIFICANT CHANGE UP (ref 5–17)
APTT BLD: 35.4 SEC — SIGNIFICANT CHANGE UP (ref 24.5–35.6)
AST SERPL-CCNC: 23 U/L — SIGNIFICANT CHANGE UP
BASOPHILS # BLD AUTO: 0 K/UL — SIGNIFICANT CHANGE UP (ref 0–0.2)
BASOPHILS NFR BLD AUTO: 0 % — SIGNIFICANT CHANGE UP (ref 0–2)
BILIRUB SERPL-MCNC: 0.4 MG/DL — SIGNIFICANT CHANGE UP (ref 0.4–2)
BUN SERPL-MCNC: 12.8 MG/DL — SIGNIFICANT CHANGE UP (ref 8–20)
BUN SERPL-MCNC: 16.5 MG/DL — SIGNIFICANT CHANGE UP (ref 8–20)
CALCIUM SERPL-MCNC: 8.9 MG/DL — SIGNIFICANT CHANGE UP (ref 8.4–10.5)
CALCIUM SERPL-MCNC: 9.1 MG/DL — SIGNIFICANT CHANGE UP (ref 8.4–10.5)
CHLORIDE SERPL-SCNC: 101 MMOL/L — SIGNIFICANT CHANGE UP (ref 96–108)
CHLORIDE SERPL-SCNC: 101 MMOL/L — SIGNIFICANT CHANGE UP (ref 96–108)
CK SERPL-CCNC: 75 U/L — SIGNIFICANT CHANGE UP (ref 25–170)
CO2 SERPL-SCNC: 23 MMOL/L — SIGNIFICANT CHANGE UP (ref 22–29)
CO2 SERPL-SCNC: 27 MMOL/L — SIGNIFICANT CHANGE UP (ref 22–29)
CREAT SERPL-MCNC: 0.87 MG/DL — SIGNIFICANT CHANGE UP (ref 0.5–1.3)
CREAT SERPL-MCNC: 0.97 MG/DL — SIGNIFICANT CHANGE UP (ref 0.5–1.3)
EGFR: 60 ML/MIN/1.73M2 — SIGNIFICANT CHANGE UP
EGFR: 68 ML/MIN/1.73M2 — SIGNIFICANT CHANGE UP
EOSINOPHIL # BLD AUTO: 0 K/UL — SIGNIFICANT CHANGE UP (ref 0–0.5)
EOSINOPHIL NFR BLD AUTO: 0 % — SIGNIFICANT CHANGE UP (ref 0–6)
GIANT PLATELETS BLD QL SMEAR: PRESENT — SIGNIFICANT CHANGE UP
GLUCOSE BLDC GLUCOMTR-MCNC: 122 MG/DL — HIGH (ref 70–99)
GLUCOSE SERPL-MCNC: 114 MG/DL — HIGH (ref 70–99)
GLUCOSE SERPL-MCNC: 92 MG/DL — SIGNIFICANT CHANGE UP (ref 70–99)
HCT VFR BLD CALC: 33.1 % — LOW (ref 34.5–45)
HCT VFR BLD CALC: 37.2 % — SIGNIFICANT CHANGE UP (ref 34.5–45)
HGB BLD-MCNC: 10.3 G/DL — LOW (ref 11.5–15.5)
HGB BLD-MCNC: 11.8 G/DL — SIGNIFICANT CHANGE UP (ref 11.5–15.5)
INR BLD: 1 RATIO — SIGNIFICANT CHANGE UP (ref 0.85–1.16)
LYMPHOCYTES # BLD AUTO: 0.53 K/UL — LOW (ref 1–3.3)
LYMPHOCYTES # BLD AUTO: 20 % — SIGNIFICANT CHANGE UP (ref 13–44)
MANUAL SMEAR VERIFICATION: SIGNIFICANT CHANGE UP
MCHC RBC-ENTMCNC: 26.3 PG — LOW (ref 27–34)
MCHC RBC-ENTMCNC: 26.4 PG — LOW (ref 27–34)
MCHC RBC-ENTMCNC: 31.1 G/DL — LOW (ref 32–36)
MCHC RBC-ENTMCNC: 31.7 G/DL — LOW (ref 32–36)
MCV RBC AUTO: 83.2 FL — SIGNIFICANT CHANGE UP (ref 80–100)
MCV RBC AUTO: 84.4 FL — SIGNIFICANT CHANGE UP (ref 80–100)
MONOCYTES # BLD AUTO: 0.19 K/UL — SIGNIFICANT CHANGE UP (ref 0–0.9)
MONOCYTES NFR BLD AUTO: 7 % — SIGNIFICANT CHANGE UP (ref 2–14)
NEUTROPHILS # BLD AUTO: 1.93 K/UL — SIGNIFICANT CHANGE UP (ref 1.8–7.4)
NEUTROPHILS NFR BLD AUTO: 73 % — SIGNIFICANT CHANGE UP (ref 43–77)
OVALOCYTES BLD QL SMEAR: SIGNIFICANT CHANGE UP
PLAT MORPH BLD: NORMAL — SIGNIFICANT CHANGE UP
PLATELET # BLD AUTO: 159 K/UL — SIGNIFICANT CHANGE UP (ref 150–400)
PLATELET # BLD AUTO: 161 K/UL — SIGNIFICANT CHANGE UP (ref 150–400)
POIKILOCYTOSIS BLD QL AUTO: SIGNIFICANT CHANGE UP
POLYCHROMASIA BLD QL SMEAR: SIGNIFICANT CHANGE UP
POTASSIUM SERPL-MCNC: 3.8 MMOL/L — SIGNIFICANT CHANGE UP (ref 3.5–5.3)
POTASSIUM SERPL-MCNC: 3.9 MMOL/L — SIGNIFICANT CHANGE UP (ref 3.5–5.3)
POTASSIUM SERPL-SCNC: 3.8 MMOL/L — SIGNIFICANT CHANGE UP (ref 3.5–5.3)
POTASSIUM SERPL-SCNC: 3.9 MMOL/L — SIGNIFICANT CHANGE UP (ref 3.5–5.3)
PROT SERPL-MCNC: 7.3 G/DL — SIGNIFICANT CHANGE UP (ref 6.6–8.7)
PROTHROM AB SERPL-ACNC: 11.6 SEC — SIGNIFICANT CHANGE UP (ref 9.9–13.4)
RBC # BLD: 3.92 M/UL — SIGNIFICANT CHANGE UP (ref 3.8–5.2)
RBC # BLD: 4.47 M/UL — SIGNIFICANT CHANGE UP (ref 3.8–5.2)
RBC # FLD: 17.6 % — HIGH (ref 10.3–14.5)
RBC # FLD: 17.8 % — HIGH (ref 10.3–14.5)
RBC BLD AUTO: ABNORMAL
SCHISTOCYTES BLD QL AUTO: SLIGHT — SIGNIFICANT CHANGE UP
SMUDGE CELLS # BLD: PRESENT — SIGNIFICANT CHANGE UP
SODIUM SERPL-SCNC: 138 MMOL/L — SIGNIFICANT CHANGE UP (ref 135–145)
SODIUM SERPL-SCNC: 139 MMOL/L — SIGNIFICANT CHANGE UP (ref 135–145)
TARGETS BLD QL SMEAR: SLIGHT — SIGNIFICANT CHANGE UP
TROPONIN T, HIGH SENSITIVITY RESULT: 24 NG/L — SIGNIFICANT CHANGE UP (ref 0–51)
WBC # BLD: 2.45 K/UL — LOW (ref 3.8–10.5)
WBC # BLD: 2.65 K/UL — LOW (ref 3.8–10.5)
WBC # FLD AUTO: 2.45 K/UL — LOW (ref 3.8–10.5)
WBC # FLD AUTO: 2.65 K/UL — LOW (ref 3.8–10.5)

## 2024-11-29 PROCEDURE — 93010 ELECTROCARDIOGRAM REPORT: CPT | Mod: 76

## 2024-11-29 PROCEDURE — 70498 CT ANGIOGRAPHY NECK: CPT | Mod: 26

## 2024-11-29 PROCEDURE — 99233 SBSQ HOSP IP/OBS HIGH 50: CPT

## 2024-11-29 PROCEDURE — 70496 CT ANGIOGRAPHY HEAD: CPT | Mod: 26

## 2024-11-29 PROCEDURE — 92978 ENDOLUMINL IVUS OCT C 1ST: CPT | Mod: 26,LD

## 2024-11-29 PROCEDURE — 99232 SBSQ HOSP IP/OBS MODERATE 35: CPT

## 2024-11-29 PROCEDURE — 92920 PRQ TRLUML C ANGIOP 1ART&/BR: CPT | Mod: LD

## 2024-11-29 PROCEDURE — 99291 CRITICAL CARE FIRST HOUR: CPT

## 2024-11-29 PROCEDURE — 71045 X-RAY EXAM CHEST 1 VIEW: CPT | Mod: 26

## 2024-11-29 PROCEDURE — 70450 CT HEAD/BRAIN W/O DYE: CPT | Mod: 26,XU

## 2024-11-29 PROCEDURE — 99152 MOD SED SAME PHYS/QHP 5/>YRS: CPT

## 2024-11-29 PROCEDURE — 93458 L HRT ARTERY/VENTRICLE ANGIO: CPT | Mod: 26,59

## 2024-11-29 PROCEDURE — 99232 SBSQ HOSP IP/OBS MODERATE 35: CPT | Mod: 57

## 2024-11-29 PROCEDURE — 0042T: CPT

## 2024-11-29 RX ORDER — SODIUM CHLORIDE 9 MG/ML
250 INJECTION, SOLUTION INTRAMUSCULAR; INTRAVENOUS; SUBCUTANEOUS ONCE
Refills: 0 | Status: COMPLETED | OUTPATIENT
Start: 2024-11-29 | End: 2024-11-29

## 2024-11-29 RX ORDER — SODIUM CHLORIDE 9 MG/ML
1000 INJECTION, SOLUTION INTRAMUSCULAR; INTRAVENOUS; SUBCUTANEOUS
Refills: 0 | Status: DISCONTINUED | OUTPATIENT
Start: 2024-11-29 | End: 2024-12-03

## 2024-11-29 RX ORDER — ISOSORBIDE MONONITRATE 10 MG
60 TABLET ORAL DAILY
Refills: 0 | Status: DISCONTINUED | OUTPATIENT
Start: 2024-11-29 | End: 2024-11-29

## 2024-11-29 RX ORDER — DAPAGLIFLOZIN 10 MG/1
5 TABLET, FILM COATED ORAL DAILY
Refills: 0 | Status: DISCONTINUED | OUTPATIENT
Start: 2024-11-29 | End: 2024-12-03

## 2024-11-29 RX ORDER — OLANZAPINE 20 MG/1
5 TABLET ORAL ONCE
Refills: 0 | Status: DISCONTINUED | OUTPATIENT
Start: 2024-11-29 | End: 2024-11-29

## 2024-11-29 RX ORDER — ISOSORBIDE MONONITRATE 10 MG
30 TABLET ORAL DAILY
Refills: 0 | Status: DISCONTINUED | OUTPATIENT
Start: 2024-11-29 | End: 2024-12-01

## 2024-11-29 RX ORDER — OLANZAPINE 20 MG/1
5 TABLET ORAL EVERY 6 HOURS
Refills: 0 | Status: DISCONTINUED | OUTPATIENT
Start: 2024-11-29 | End: 2024-12-03

## 2024-11-29 RX ORDER — OLANZAPINE 20 MG/1
5 TABLET ORAL ONCE
Refills: 0 | Status: COMPLETED | OUTPATIENT
Start: 2024-11-29 | End: 2024-11-29

## 2024-11-29 RX ADMIN — Medication 81 MILLIGRAM(S): at 12:00

## 2024-11-29 RX ADMIN — CLOPIDOGREL 75 MILLIGRAM(S): 75 TABLET, FILM COATED ORAL at 05:27

## 2024-11-29 RX ADMIN — ACETAMINOPHEN 500MG 650 MILLIGRAM(S): 500 TABLET, COATED ORAL at 12:05

## 2024-11-29 RX ADMIN — OLANZAPINE 5 MILLIGRAM(S): 20 TABLET ORAL at 17:31

## 2024-11-29 RX ADMIN — Medication 30 MILLIGRAM(S): at 12:05

## 2024-11-29 RX ADMIN — Medication 5000 UNIT(S): at 05:27

## 2024-11-29 RX ADMIN — SODIUM CHLORIDE 250 MILLILITER(S): 9 INJECTION, SOLUTION INTRAMUSCULAR; INTRAVENOUS; SUBCUTANEOUS at 07:16

## 2024-11-29 RX ADMIN — AMLODIPINE BESYLATE 10 MILLIGRAM(S): 10 TABLET ORAL at 05:27

## 2024-11-29 RX ADMIN — SODIUM CHLORIDE 250 MILLILITER(S): 9 INJECTION, SOLUTION INTRAMUSCULAR; INTRAVENOUS; SUBCUTANEOUS at 10:00

## 2024-11-29 RX ADMIN — VALSARTAN 160 MILLIGRAM(S): 320 TABLET ORAL at 05:27

## 2024-11-29 RX ADMIN — ACETAMINOPHEN 500MG 650 MILLIGRAM(S): 500 TABLET, COATED ORAL at 12:41

## 2024-11-29 RX ADMIN — Medication 40 MILLIGRAM(S): at 21:36

## 2024-11-29 NOTE — CHART NOTE - NSCHARTNOTEFT_GEN_A_CORE
Nurse Practitioner Progress note:   s/p LHC by Dr Cortes    Cath revealed: LM stent patent.  RCA and LCX with mild diffuse disease. pLAD with  unchanged since last year.    Medication received during procedure:  Versed: 0.5 mg  Fentanyl: 25 mcg  Heparin: 8000 u  hydrocortizone: 150 mg  benadryl: 25 mg  pepcid: 20 mg  Omnipaque: 110 ml    Patient feels well.  Denies chest pain, shortness of breath, dizziness or palpitations at this time    Right groin procedure site CDI.  no bleeding, no hematoma, site soft, non tender, positive pedal pulses bilaterally        T(C): 36.7 (11-29-24 @ 07:08), Max: 36.8 (11-28-24 @ 21:27)  HR: 70 (11-29-24 @ 09:45) (69 - 100)  BP: 149/67 (11-29-24 @ 09:45) (102/60 - 149/67)  RR: 17 (11-29-24 @ 09:45) (16 - 18)  SpO2: 100% (11-29-24 @ 09:45) (95% - 100%)  Wt(kg): --    now s/p LHC.  POBA to LAD .  No pci done.    ASSESSMENT/PLAN:    Coronary artery disease  -Will cont to monitor pt and if she remains with symptoms will consider PCI on pLAD   -Resume cardiac diet  - cc bolus to prevent LIU  -DAPT with asa/plavix for at least 3 months  -Plan of care discussed with patient, family and MD  -Transfer back to bed when criteria met Nurse Practitioner Progress note:   s/p LHC by Dr Cortes    Cath revealed: LM stent patent.  RCA and LCX with mild diffuse disease. pLAD with  unchanged since last year.    Medication received during procedure:  Versed: 0.5 mg  Fentanyl: 25 mcg  Heparin: 8000 u  hydrocortizone: 150 mg  benadryl: 25 mg  pepcid: 20 mg  Omnipaque: 110 ml    Patient feels well.  Denies chest pain, shortness of breath, dizziness or palpitations at this time    Right groin procedure site CDI.  no bleeding, no hematoma, site soft, non tender, positive pedal pulses bilaterally      T(C): 36.7 (11-29-24 @ 07:08), Max: 36.8 (11-28-24 @ 21:27)  HR: 70 (11-29-24 @ 09:45) (69 - 100)  BP: 149/67 (11-29-24 @ 09:45) (102/60 - 149/67)  RR: 17 (11-29-24 @ 09:45) (16 - 18)  SpO2: 100% (11-29-24 @ 09:45) (95% - 100%)  Wt(kg): --    now s/p LHC.  POBA to LAD .  No pci done.    ASSESSMENT/PLAN:    Coronary artery disease  -Will cont to monitor pt and if she remains with symptoms will consider PCI on pLAD   -Resume cardiac diet  - cc bolus to prevent LIU  -DAPT with asa/plavix for at least 3 months  -Plan of care discussed with patient  -F/U Dr. Cortes   -Transfer back to bed when criteria met

## 2024-11-29 NOTE — CONSULT NOTE ADULT - SUBJECTIVE AND OBJECTIVE BOX
Narrative: 79y old  Female PMH HTN, HLD, CAD with LAD stent in 2021; SHIRA of ostial LM 10/2023, Sarcoidosis by Vats per pt hx. Presents with one year on chest pain and dyspnea on exertion.  Post SHIRA 10/2023 she still had symptoms of SOB up till now. She presents with c/o chest pain.  NST positive for ischemia.  For LHC to assess coronary anatomy.  Review of Systems: negative unless mentioned in HPI    Symptoms:        Angina (Class): III       Ischemic Symptoms: CP    Heart Failure:        Systolic/Diastolic/Combined:        NYHA Class (within 2 weeks):     Assessment of LVEF (Must be within 6 months):       EF: 45-50%       Assessed by: echo       Date: 11/26/24    Prior Cardiac Interventions (LHC, stents, CABG):       PCI's (Date, Stents, Vessels): 2021 LAD stent.  2023 oLM stent       CABG (Date, Grafts): NA    Stress Test (Date, Findings):   Conclusions:   1. Myocardial Perfusion: Abnormal.   2. The patient underwent stress testing using pharmacological IV regadenoson (bolus injection, 400mcg over 10 to 20 seconds followed by5ml flush) protocol.      _ The total procedure time was 4 minutes . The test was stopped due to completion of protocol.         3. Baseline electrocardiogram: normal sinus rhythm at a rate of 63 bpm.   4. Stress electrocardiogram: No ischemic ST segment changes.   5. Normal left ventricular regional wall motion.   6. Qualitative Perfusion:      - medium-sized, severe defect(s) in the apical and distal anteroseptal walls that are reversible suggestive of severe ischemia consistent with ischemia.   7. The left ventricle is normal in function. The post stress left ventricular EF is 48 %. The stress end diastolic volume is 105 ml and systolic volume is 55 ml.      Echo (Date, Findings): 11/26/23  CONCLUSIONS:    1. Left ventricular systolic function is mildly decreased with an ejection fraction visually estimated at 45 to 50 %.   2. Basal and mid anterior septum is abnormal.   3. There is mild (grade 1) left ventricular diastolic dysfunction.   4. Normal right ventricular cavity size and probably normal right ventricular systolic function.   5. Estimated pulmonary artery systolic pressure is 27 mmHg.   6. Normal left and right atrial size.   7. No pericardial effusion seen.   8. No prior echocardiogram is available for comparison.    MEDICATIONS  (STANDING):  amLODIPine   Tablet 10 milliGRAM(s) Oral daily  aspirin  chewable 81 milliGRAM(s) Oral once  atorvastatin 40 milliGRAM(s) Oral at bedtime  clopidogrel Tablet 75 milliGRAM(s) Oral daily  heparin   Injectable 5000 Unit(s) SubCutaneous every 12 hours  hydrochlorothiazide 25 milliGRAM(s) Oral daily  isosorbide   mononitrate ER Tablet (IMDUR) 30 milliGRAM(s) Oral daily  valsartan 160 milliGRAM(s) Oral daily      Antianginal Therapies:        Beta Blockers:         Calcium Channel Blockers: amlodipine       Long Acting Nitrates: imdur       Ranexa:     Associated Risk Factors:        Fragility Score: (N/A, mild, moderate, severe): mild       Cerebrovascular Disease: N/A       Chronic Lung Disease: N/A       Peripheral Arterial Disease: N/A       Chronic Kidney Disease (if yes, what is GFR): N/A       Uncontrolled Diabetes (if yes, what is HgbA1C or FBS): N/A       Poorly Controlled Hypertension (if yes, what is SBP): N/A       Morbid Obesity (if yes, what is BMI): N/A       History of Recent Ventricular Arrhythmia: N/A       Inability to Ambulate Safely: N/A       Need for Therapeutic Anticoagulation: N/A       Antiplatelet or Contrast Allergy: N/A    PHYSICAL EXAM:  Constitutional: A & O x 3, NAD  HEENT:  Normal oral mucosa, PERRL, EOMI	  Cardiovascular: S1 S2, III/VI murmur, No JVD  Respiratory: Lungs clear to auscultation	  Gastrointestinal:  Soft, Non-tender, + BS	  Skin: No rashes or cyanosis  Neurologic: No deficit appreciated  Extremities: Normal range of motion, no edema  Vascular: distal pulses +

## 2024-11-29 NOTE — DISCHARGE NOTE PROVIDER - HOSPITAL COURSE
Patient is a 78 yo F w/ PMH of HTN, CAD s/p SHIRA (10/23), HLD, Sarcoidosis presenting with chief complaint of chest pain and shortness of breath. Admitted for Chest pain, Hx of CAD s/p SHIRA as well as Acute CHF exacerbation. sp IV lasix, now euvolemic. TTE with LVEF of 45-50%, basal and mid anterior septum abnormal. +NST, seen by cards, s/p LHC, without intervention. Cleared by both cards and intervention cards for discharge.        Patient is a 80 yo F w/ PMH of HTN, CAD s/p SHIRA (10/23), HLD, Sarcoidosis presenting with chief complaint of chest pain and shortness of breath. 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 S/P IV Lasix, TTE EF 45-50%. +NST,S/P LHC  11/29 no acute change.continue GDMT,ASA,Plavix. Code stroke called 11/29/24 for symptoms of altered mental status and expressive aphasia.CTH stable.symptoms resolved. pt had episode of hypotension. adjust bp meds with improvement. PT rec home. pending MRI Brain. F/U cardiology out   Patient is a 80 yo F w/ PMH of HTN, CAD s/p SHIRA (10/23), HLD, Sarcoidosis presenting with chief complaint of chest pain and shortness of breath. 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 S/P IV Lasix, TTE EF 45-50%. +NST,S/P LHC  11/29 no acute change.continue GDMT,ASA,Plavix. Code stroke called 11/29/24 for symptoms of altered mental status and expressive aphasia.CTH stable.symptoms resolved. pt had episode of hypotension. adjust bp meds by cardiology with improvement. PT rec home. pending MRI Brain. F/U cardiology out   Patient is a 78 yo F w/ PMH of HTN, CAD s/p SHIRA (10/23), HLD, Sarcoidosis presenting with chief complaint of chest pain and shortness of breath. 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 S/P IV Lasix, TTE EF 45-50%. +NST,S/P LHC  11/29 no acute change.continue GDMT,ASA,Plavix. Code stroke called 11/29/24 for symptoms of altered mental status and expressive aphasia.CTH stable.symptoms resolved. pt had episode of hypotension. adjust bp meds by cardiology with improvement. PT rec home. stable MRI Brain. F/U cardiology out

## 2024-11-29 NOTE — DISCHARGE NOTE PROVIDER - NSDCCPCAREPLAN_GEN_ALL_CORE_FT
PRINCIPAL DISCHARGE DIAGNOSIS  Diagnosis: Acute on chronic diastolic congestive heart failure  Assessment and Plan of Treatment:       SECONDARY DISCHARGE DIAGNOSES  Diagnosis: Acute diastolic congestive heart failure  Assessment and Plan of Treatment:     Diagnosis: CAD (coronary artery disease)  Assessment and Plan of Treatment:     Diagnosis: Hypotension  Assessment and Plan of Treatment:     Diagnosis: AMS (altered mental status)  Assessment and Plan of Treatment:     Diagnosis: Hyperlipidemia  Assessment and Plan of Treatment:

## 2024-11-29 NOTE — DISCHARGE NOTE PROVIDER - CARE PROVIDERS DIRECT ADDRESSES
[email or web address]

## 2024-11-29 NOTE — PROGRESS NOTE ADULT - SUBJECTIVE AND OBJECTIVE BOX
[facility]                                                         CARDIOLOGY AT [place] [address]                                                         Telephone: [phone]. Fax:[phone]                                                                             PROGRESS NOTE    Chief Complaint upon presentation to hospital: SOB  Initial reason for Consult: SOB  Reason for follow up: abnormal NST  Overall Plan: cath today     Review of symptoms:   Cardiac:  No chest pain. No dyspnea. No palpitations.  Respiratory: no cough. No dyspnea  Gastrointestinal: No diarrhea. No abdominal pain. No bleeding.   Neuro: No focal neuro complaints.    Vitals:  T(C): 36.7 (11-29-24 @ 07:08), Max: 36.8 (11-28-24 @ 21:27)  HR: 70 (11-29-24 @ 09:45) (69 - 100)  BP: 149/67 (11-29-24 @ 09:45) (102/60 - 149/67)  RR: 17 (11-29-24 @ 09:45) (16 - 18)  SpO2: 100% (11-29-24 @ 09:45) (95% - 100%)  Wt(kg): --  I&O's Summary    28 Nov 2024 07:01  -  29 Nov 2024 07:00  --------------------------------------------------------  IN: 642 mL / OUT: 650 mL / NET: -8 mL      Weight (kg): 88.6 (11-29 @ 07:08)    PHYSICAL EXAM:  Appearance: Comfortable. No acute distress  HEENT:  Atraumatic. Normocephalic.  Normal oral mucosa  Neurologic: A & O x 3, no gross focal deficits.  Cardiovascular: RRR S1 S2, No murmur, no rubs/gallops. No JVD  Respiratory: Lungs clear to auscultation, unlabored   Gastrointestinal:  Soft, Non-tender, + BS  Lower Extremities: 2+ Peripheral Pulses, No clubbing, cyanosis, or edema  Psychiatry: Patient is calm. No agitation.   Skin: warm and dry.      CURRENT CARDIAC MEDICATIONS:  amLODIPine   Tablet 10 milliGRAM(s) Oral daily  hydrochlorothiazide 25 milliGRAM(s) Oral daily  isosorbide   mononitrate ER Tablet (IMDUR) 30 milliGRAM(s) Oral daily  valsartan 160 milliGRAM(s) Oral daily      CURRENT OTHER MEDICATIONS:  acetaminophen     Tablet .. 650 milliGRAM(s) Oral every 6 hours PRN Temp greater or equal to 38C (100.4F), Mild Pain (1 - 3)  meclizine 25 milliGRAM(s) Oral three times a day PRN for dizziness  melatonin 3 milliGRAM(s) Oral at bedtime PRN Insomnia  ondansetron Injectable 4 milliGRAM(s) IV Push every 8 hours PRN Nausea and/or Vomiting  aluminum hydroxide/magnesium hydroxide/simethicone Suspension 30 milliLiter(s) Oral every 4 hours PRN Dyspepsia  atorvastatin 40 milliGRAM(s) Oral at bedtime  aspirin  chewable 81 milliGRAM(s) Oral once, Stop order after: 1 Doses  clopidogrel Tablet 75 milliGRAM(s) Oral daily  heparin   Injectable 5000 Unit(s) SubCutaneous every 12 hours  sodium chloride 0.9% Bolus 250 milliLiter(s) IV Bolus once, Stop order after: 1 Doses      LABS:	 	                            10.3   2.45  )-----------( 161      ( 29 Nov 2024 05:01 )             33.1     11-29    139  |  101  |  16.5  ----------------------------<  92  3.9   |  27.0  |  0.97    Ca    9.1      29 Nov 2024 05:01    TPro  6.6  /  Alb  3.4  /  TBili  0.4  /  DBili  x   /  AST  28  /  ALT  12  /  AlkPhos  58  11-28    PT/INR/PTT ( 26 Nov 2024 12:17 )                       :                       :      11.4         :       31.1                  .        .                   .              .           .       1.01        .                                       Lipid Profile:   HgA1c:   TSH:     < from: TTE W or WO Ultrasound Enhancing Agent (11.26.24 @ 21:21) >  CONCLUSIONS:      1. Left ventricular systolic function is mildly decreased with an ejection fraction visually estimated at 45 to 50 %.   2. Basal and mid anterior septum is abnormal.   3. There is mild (grade 1) left ventricular diastolic dysfunction.   4. Normal right ventricular cavity size and probably normal right ventricular systolic function.   5. Estimated pulmonary artery systolic pressure is 27 mmHg.   6. Normal left and right atrial size.   7. No pericardial effusion seen.   8. No prior echocardiogram is available for comparison.      < end of copied text >

## 2024-11-29 NOTE — DISCHARGE NOTE PROVIDER - ATTENDING DISCHARGE PHYSICAL EXAMINATION:
VITALS:   T(C): 36.6 (11-29-24 @ 12:04), Max: 36.8 (11-28-24 @ 21:27)  HR: 70 (11-29-24 @ 12:04) (64 - 100)  BP: 152/82 (11-29-24 @ 12:04) (102/60 - 152/82)  RR: 17 (11-29-24 @ 12:04) (16 - 18)  SpO2: 99% (11-29-24 @ 12:04) (95% - 100%)    GENERAL: NAD, lying in bed comfortably  HEAD:  Atraumatic, Normocephalic  EYES: EOMI, PERRLA, conjunctiva and sclera clear  ENT: Moist mucous membranes  NECK: Supple, No JVD  CHEST/LUNG: Clear to auscultation bilaterally; No rales, rhonchi, wheezing, or rubs. Unlabored respirations  HEART: Regular rate and rhythm; No murmurs, rubs, or gallops  ABDOMEN: BSx4; Soft, nontender, nondistended  EXTREMITIES:  2+ Peripheral Pulses, brisk capillary refill. No clubbing, cyanosis, or edema  NERVOUS SYSTEM:  A&Ox3, no focal deficits   SKIN: No rashes or lesions  PSYCH: Normal affect, euthymic mood

## 2024-11-29 NOTE — CONSULT NOTE ADULT - ASSESSMENT
79 year old female with prior stents who presents with chest pain.  Trops negative.  Positive ischemia on NST.  For LHC    Risk Stratification:  ASA: 3  Mallampati: 2  Bleeding Risk: 3.0%  Creatinine: 0.97  GFR: 60  Pt assessed, appropriate for sedation, pt educated regarding the plan for Versed/Fentanyl as needed.    Plan/Recommendations:   -plan for LHC  -preferred access: RRA   -patient seen and examined  -confirmed appropriate NPO duration  -ECG and Labs reviewed  -Aspirin 81mg po pre-cath   -NS 250mL IV bolus pre-cath   -procedure discussed with patient; risks and benefits explained, questions answered  -consent obtained by attending IC    Risks, benefits, and alternatives reviewed.  Risks including but not limited to MI, death, stroke, bleeding, infection, vessel injury, hematoma, renal failure, allergic reaction, urgent open heart surgery, restenosis and stent thrombosis were reviewed.  All questions answered.  Patient is agreeable to proceed.

## 2024-11-29 NOTE — DISCHARGE NOTE PROVIDER - NSDCQMAMI_CARD_ALL_CORE
This nurse attempts to get consent signed with patient for ALEX. Patient unable to stay awake to sign. No

## 2024-11-29 NOTE — PROGRESS NOTE ADULT - ASSESSMENT
Patient is a 78 yo F w/ PMH of HTN, CAD s/p SHIRA (10/23), HLD, Sarcoidosis presenting with chief complaint of chest pain and shortness of breath.    Chest pain, Hx of CAD s/p SHIRA  Acute CHF exacerbation - now euvolemic on exam   - Cardiology following  - TTE w/ LVEF of 45-50%, basal and mid anterior septum abnormal  - Trop 25-24  - ProBNP 707  - S/p IV Lasix  - Monitor I&Os, daily weights, fluid/salt restriction  - Telemetry  - NST +   - Resume ARB, Imdur, Statin  - Continue Plavix per Cardio recs  - SGLT2i on discharge  - s/p LHC -- no intervention     Headache  - suspect 2/2 recent anesthesia   - PRN Tylenol  - if persistent HA, will check CTH    Hx of HTN  - Resume home medications    HLD  - Continue Statin    DVT ppx: Heparin SQ  Dispo: pending improvement in headache  Patient is a 80 yo F w/ PMH of HTN, CAD s/p SHIRA (10/23), HLD, Sarcoidosis presenting with chief complaint of chest pain and shortness of breath.    Chest pain, Hx of CAD s/p SHIRA  Acute CHF exacerbation - now euvolemic on exam   - Cardiology following  - TTE w/ LVEF of 45-50%, basal and mid anterior septum abnormal  - Trop 25-24  - ProBNP 707  - S/p IV Lasix  - Monitor I&Os, daily weights, fluid/salt restriction  - Telemetry  - NST +   - Resume ARB, Imdur, Statin  - Continue Plavix per Cardio recs  - SGLT2i on discharge  - s/p LHC -- no intervention, DAPT x 3 months    Headache  - suspect 2/2 recent anesthesia   - PRN Tylenol  - if persistent HA, will check CTH    Hx of HTN  - Resume home medications    HLD  - Continue Statin    DVT ppx: Heparin SQ  Dispo: pending improvement in headache

## 2024-11-29 NOTE — DISCHARGE NOTE PROVIDER - NSDCCPTREATMENT_GEN_ALL_CORE_FT
PRINCIPAL PROCEDURE  Procedure: Left heart cardiac cath  Findings and Treatment: pLAD with   If pt is symptomatic with bring back in 6 weeks for  PCI

## 2024-11-29 NOTE — CONSULT NOTE ADULT - SUBJECTIVE AND OBJECTIVE BOX
Preliminary note, official note pending attending review/signature.  Seen and examined by Stroke team attending/team, assessment/ plan as discussed with stroke team attending/team as noted.                                [facility] Stroke Team  CC: Altered mental status   HPI:  Patient is a 80 yo F w/ PMH of HTN, CAD s/p SHIRA (10/23), HLD, Sarcoidosis presented to [facility] 11/27/24 with chief complaint of chest pain and shortness of breath.  Patient stated she has been experiencing chest pain and MCCOY for the past year. She had a SHIRA in  10/2023 and she states she has been experiencing these symptoms since. Her symptoms worsened after she was rushing to get into a building and EMS was called. She was observed in the EDOU and Cardiology was consulted. NST was positive. Patient underwent cardiac catheterization 11/29/24.     Code stroke called 11/29/24 for symptoms of altered mental status and expressive aphasia. Last known well was approximately 2:30 PM, at 4:30PM primary team noted patient to be confused, not following commands or answering questions appropriately. CT head and CTA head/neck showed no acute abnormalities.    PAST MEDICAL & SURGICAL HISTORY:  COPD, mild  Brain aneurysm  Chronic arthritis or osteoarthritis  Rheumatoid arthritis  Primary hypertension  Pulmonary sarcoidosis  S/P hip replacement, left  History of surgery on arm  History of hand surgery  History of breast biopsy  History of lung biopsy  History of foot surgery    MEDICATIONS  (STANDING):  amLODIPine   Tablet 10 milliGRAM(s) Oral daily  atorvastatin 40 milliGRAM(s) Oral at bedtime  clopidogrel Tablet 75 milliGRAM(s) Oral daily  dapagliflozin 5 milliGRAM(s) Oral daily  heparin   Injectable 5000 Unit(s) SubCutaneous every 12 hours  hydrochlorothiazide 25 milliGRAM(s) Oral daily  isosorbide   mononitrate ER Tablet (IMDUR) 30 milliGRAM(s) Oral daily  sodium chloride 0.9%. 1000 milliLiter(s) (100 mL/Hr) IV Continuous <Continuous>  valsartan 160 milliGRAM(s) Oral daily    MEDICATIONS  (PRN):  acetaminophen     Tablet .. 650 milliGRAM(s) Oral every 6 hours PRN Temp greater or equal to 38C (100.4F), Mild Pain (1 - 3)  aluminum hydroxide/magnesium hydroxide/simethicone Suspension 30 milliLiter(s) Oral every 4 hours PRN Dyspepsia  meclizine 25 milliGRAM(s) Oral three times a day PRN for dizziness  melatonin 3 milliGRAM(s) Oral at bedtime PRN Insomnia  OLANZapine Injectable 5 milliGRAM(s) IntraMuscular every 6 hours PRN Agitation  ondansetron Injectable 4 milliGRAM(s) IV Push every 8 hours PRN Nausea and/or Vomiting      Allergies    No Known Allergies    Intolerances    SOCIAL HISTORY:  no tob,   no alcohol   no drugs    FAMILY HISTORY:  Family history of myocardial infarction (Father)    Family history of cerebrovascular accident (CVA) (Father)    Family history of schizophrenia (Mother)    Family history of cardiac disorder (Grandparent)          ROS: 14 point ROS negative other than what is present in HPI or below    Vital Signs Last 24 Hrs  T(C): 37 (29 Nov 2024 17:35), Max: 37.1 (29 Nov 2024 16:30)  T(F): 98.6 (29 Nov 2024 17:35), Max: 98.7 (29 Nov 2024 16:30)  HR: 103 (29 Nov 2024 17:35) (64 - 103)  BP: 156/87 (29 Nov 2024 17:35) (112/57 - 156/87)  BP(mean): 109 (29 Nov 2024 17:35) (75 - 109)  RR: 16 (29 Nov 2024 17:35) (16 - 18)  SpO2: 100% (29 Nov 2024 17:35) (96% - 100%)    Parameters below as of 29 Nov 2024 17:35  Patient On (Oxygen Delivery Method): room air    Physical Exam:  General: Sitting up in bed, agitated     Detailed Neurologic Exam:    Mental status: The patient is awake, alert. Oriented to self, place w/ choices, knows the year is 2024, cannot state the month but knows that yesterday was thanksgiving. Left-right confusion. Able to ID objects, does not reliably follow commands or repeat sentences.     Cranial nerves: Pupils equal and react symmetrically to light. +Blink to threat bilaterally. Extraocular motion is full with no nystagmus. There is no ptosis. Slight L facial asymmetry. Palate elevates symmetrically. Tongue is midline.    Motor: There is normal bulk and tone.  There is no tremor.  Patient does not follow commands for formal strength testing; moves all extremities equally, able to hold all extremities against gravity without drift     Sensation: Intact to light touch in 4 extremities    Cerebellar: There is no dysmetria on finger to nose testing.    Gait : deferred    NIH SS:  DATE: 11/29/24   TIME: 17:30hrs   1A: Level of consciousness (0-3): 0  1B: Questions (0-2): 1  1C: Commands (0-2): 1  2: Gaze (0-2): 0  3: Visual fields (0-3): 0  4: Facial palsy (0-3): 1  MOTOR:  5A: Left arm motor drift (0-4): 0  5B: Right arm motor drift (0-4): 0  6A: Left leg motor drift (0-4): 0  6B: Right leg motor drift (0-4): 0  7: Limb ataxia (0-2): 0  SENSORY:  8: Sensation (0-2): 0  SPEECH:  9: Language (0-3): 1  10: Dysarthria (0-2): 0  EXTINCTION:  11: Extinction/inattention (0-2):     TOTAL SCORE: 4    prehospital mRS=unknown       LABS:                         10.3   2.45  )-----------( 161      ( 29 Nov 2024 05:01 )             33.1       11-29    139  |  101  |  16.5  ----------------------------<  92  3.9   |  27.0  |  0.97    Ca    9.1      29 Nov 2024 05:01    TPro  6.6  /  Alb  3.4  /  TBili  0.4  /  DBili  x   /  AST  28  /  ALT  12  /  AlkPhos  58  11-28    LDL: Pending   A1C: Pending     RADIOLOGY & ADDITIONAL STUDIES (independently reviewed unless otherwise noted):  CT Head, CT Angio Head and Neck, CT Perfusion (11.29.24 @ 16:43)   IMPRESSION:  HEAD CT: No evidence of an acute intracranial hemorrhage, midline shift or   hydrocephalus.  NECK CTA: No hemodynamic significant narrowing within the neck.  BRAIN CTA: No proximal large vessel occlusion.  CT PERFUSION: No areas of ischemia identified.    ---  US Duplex Venous Lower Ext Complete, Bilateral (11.26.24 @ 18:00)   IMPRESSION:  No evidence of deep venous thrombosis in either lower extremity.  --  TTE W or WO Ultrasound Enhancing Agent (11.26.24 @ 21:21)   CONCLUSIONS:   1. Left ventricular systolic function is mildly decreased with an ejection fraction visually estimated at 45 to 50 %.   2. Basal and mid anterior septum is abnormal.   3. There is mild (grade 1) left ventricular diastolic dysfunction.   4. Normal right ventricular cavity size and probably normal right ventricular systolic function.   5. Estimated pulmonary artery systolic pressure is 27 mmHg.   6. Normal left and right atrial size.   7. No pericardial effusion seen.   8. No prior echocardiogram is available for comparison.     [facility] Stroke Team  CC: Altered mental status   HPI:  Patient is a 78 yo F w/ PMH of HTN, CAD s/p SHIRA (10/23), HLD, Sarcoidosis presented to [facility] 11/27/24 with chief complaint of chest pain and shortness of breath.  Patient stated she has been experiencing chest pain and MCCOY for the past year. She had a SHIRA in  10/2023 and she states she has been experiencing these symptoms since. Her symptoms worsened after she was rushing to get into a building and EMS was called. She was observed in the EDOU and Cardiology was consulted. NST was positive. Patient underwent cardiac catheterization 11/29/24.     Code stroke called 11/29/24 for symptoms of altered mental status and expressive aphasia. Last known well was approximately 2:30 PM, at 4:30PM primary team noted patient to be confused, not following commands or answering questions appropriately; NIH=4. CT head and CTA head/neck showed no acute abnormalities.    PAST MEDICAL & SURGICAL HISTORY:  COPD, mild  Brain aneurysm  Chronic arthritis or osteoarthritis  Rheumatoid arthritis  Primary hypertension  Pulmonary sarcoidosis  S/P hip replacement, left  History of surgery on arm  History of hand surgery  History of breast biopsy  History of lung biopsy  History of foot surgery    MEDICATIONS  (STANDING):  amLODIPine   Tablet 10 milliGRAM(s) Oral daily  atorvastatin 40 milliGRAM(s) Oral at bedtime  clopidogrel Tablet 75 milliGRAM(s) Oral daily  dapagliflozin 5 milliGRAM(s) Oral daily  heparin   Injectable 5000 Unit(s) SubCutaneous every 12 hours  hydrochlorothiazide 25 milliGRAM(s) Oral daily  isosorbide   mononitrate ER Tablet (IMDUR) 30 milliGRAM(s) Oral daily  sodium chloride 0.9%. 1000 milliLiter(s) (100 mL/Hr) IV Continuous <Continuous>  valsartan 160 milliGRAM(s) Oral daily    MEDICATIONS  (PRN):  acetaminophen     Tablet .. 650 milliGRAM(s) Oral every 6 hours PRN Temp greater or equal to 38C (100.4F), Mild Pain (1 - 3)  aluminum hydroxide/magnesium hydroxide/simethicone Suspension 30 milliLiter(s) Oral every 4 hours PRN Dyspepsia  meclizine 25 milliGRAM(s) Oral three times a day PRN for dizziness  melatonin 3 milliGRAM(s) Oral at bedtime PRN Insomnia  OLANZapine Injectable 5 milliGRAM(s) IntraMuscular every 6 hours PRN Agitation  ondansetron Injectable 4 milliGRAM(s) IV Push every 8 hours PRN Nausea and/or Vomiting      Allergies    No Known Allergies    Intolerances    SOCIAL HISTORY:  no tob,   no alcohol   no drugs    FAMILY HISTORY:  Family history of myocardial infarction (Father)    Family history of cerebrovascular accident (CVA) (Father)    Family history of schizophrenia (Mother)    Family history of cardiac disorder (Grandparent)          ROS: 14 point ROS negative other than what is present in HPI or below    Vital Signs Last 24 Hrs  T(C): 37 (29 Nov 2024 17:35), Max: 37.1 (29 Nov 2024 16:30)  T(F): 98.6 (29 Nov 2024 17:35), Max: 98.7 (29 Nov 2024 16:30)  HR: 103 (29 Nov 2024 17:35) (64 - 103)  BP: 156/87 (29 Nov 2024 17:35) (112/57 - 156/87)  BP(mean): 109 (29 Nov 2024 17:35) (75 - 109)  RR: 16 (29 Nov 2024 17:35) (16 - 18)  SpO2: 100% (29 Nov 2024 17:35) (96% - 100%)    Parameters below as of 29 Nov 2024 17:35  Patient On (Oxygen Delivery Method): room air    Physical Exam:  General: Sitting up in bed, agitated     Detailed Neurologic Exam:    Mental status: The patient is awake, alert. Oriented to self, place w/ choices, knows the year is 2024, cannot state the month but knows that yesterday was thanksgiving. Left-right confusion. Able to ID objects, does not reliably follow commands or repeat sentences.     Cranial nerves: Pupils equal and react symmetrically to light. +Blink to threat bilaterally. Extraocular motion is full with no nystagmus. There is no ptosis. Slight L facial asymmetry. Palate elevates symmetrically. Tongue is midline.    Motor: There is normal bulk and tone.  There is no tremor.  Patient does not follow commands for formal strength testing; moves all extremities equally, able to hold all extremities against gravity without drift     Sensation: Intact to light touch in 4 extremities    Cerebellar: There is no dysmetria on finger to nose testing.    Gait : deferred    NIH SS:  DATE: 11/29/24   TIME: 17:30hrs   1A: Level of consciousness (0-3): 0  1B: Questions (0-2): 1  1C: Commands (0-2): 1  2: Gaze (0-2): 0  3: Visual fields (0-3): 0  4: Facial palsy (0-3): 1  MOTOR:  5A: Left arm motor drift (0-4): 0  5B: Right arm motor drift (0-4): 0  6A: Left leg motor drift (0-4): 0  6B: Right leg motor drift (0-4): 0  7: Limb ataxia (0-2): 0  SENSORY:  8: Sensation (0-2): 0  SPEECH:  9: Language (0-3): 1  10: Dysarthria (0-2): 0  EXTINCTION:  11: Extinction/inattention (0-2):     TOTAL SCORE: 4    prehospital mRS=unknown       LABS:                         10.3   2.45  )-----------( 161      ( 29 Nov 2024 05:01 )             33.1       11-29    139  |  101  |  16.5  ----------------------------<  92  3.9   |  27.0  |  0.97    Ca    9.1      29 Nov 2024 05:01    TPro  6.6  /  Alb  3.4  /  TBili  0.4  /  DBili  x   /  AST  28  /  ALT  12  /  AlkPhos  58  11-28    LDL: Pending   A1C: Pending     RADIOLOGY & ADDITIONAL STUDIES (independently reviewed unless otherwise noted):  CT Head, CT Angio Head and Neck, CT Perfusion (11.29.24 @ 16:43)   IMPRESSION:  HEAD CT: No evidence of an acute intracranial hemorrhage, midline shift or   hydrocephalus.  NECK CTA: No hemodynamic significant narrowing within the neck.  BRAIN CTA: No proximal large vessel occlusion.  CT PERFUSION: No areas of ischemia identified.    ---  US Duplex Venous Lower Ext Complete, Bilateral (11.26.24 @ 18:00)   IMPRESSION:  No evidence of deep venous thrombosis in either lower extremity.  --  TTE W or WO Ultrasound Enhancing Agent (11.26.24 @ 21:21)   CONCLUSIONS:   1. Left ventricular systolic function is mildly decreased with an ejection fraction visually estimated at 45 to 50 %.   2. Basal and mid anterior septum is abnormal.   3. There is mild (grade 1) left ventricular diastolic dysfunction.   4. Normal right ventricular cavity size and probably normal right ventricular systolic function.   5. Estimated pulmonary artery systolic pressure is 27 mmHg.   6. Normal left and right atrial size.   7. No pericardial effusion seen.   8. No prior echocardiogram is available for comparison.     [facility] Stroke Team  CC: Altered mental status     HPI:Patient is a 80 yo F w/ PMH of HTN, CAD s/p SHIRA (10/23), HLD, Sarcoidosis presented to [facility] 11/27/24 with chief complaint of chest pain and shortness of breath.  Patient stated she has been experiencing chest pain and MCCOY for the past year. She had a SHIRA in 10/2023 and she states she has been experiencing these symptoms since. Her symptoms worsened after she was rushing to get into a building and EMS was called. She was observed in the EDOU and Cardiology was consulted. NST was positive. Patient underwent cardiac catheterization 11/29/24.     Code stroke called 11/29/24 for symptoms of altered mental status and expressive aphasia. Last known well was approximately 2:30 PM, at 4:30PM primary team noted patient to be confused, not following commands or answering questions appropriately; NIH=4. CT head and CTA head/neck showed no acute abnormalities.    PAST MEDICAL & SURGICAL HISTORY:  COPD, mild  Brain aneurysm  Chronic arthritis or osteoarthritis  Rheumatoid arthritis  Primary hypertension  Pulmonary sarcoidosis  S/P hip replacement, left  History of surgery on arm  History of hand surgery  History of breast biopsy  History of lung biopsy  History of foot surgery    MEDICATIONS  (STANDING):  amLODIPine   Tablet 10 milliGRAM(s) Oral daily  atorvastatin 40 milliGRAM(s) Oral at bedtime  clopidogrel Tablet 75 milliGRAM(s) Oral daily  dapagliflozin 5 milliGRAM(s) Oral daily  heparin   Injectable 5000 Unit(s) SubCutaneous every 12 hours  hydrochlorothiazide 25 milliGRAM(s) Oral daily  isosorbide   mononitrate ER Tablet (IMDUR) 30 milliGRAM(s) Oral daily  sodium chloride 0.9%. 1000 milliLiter(s) (100 mL/Hr) IV Continuous <Continuous>  valsartan 160 milliGRAM(s) Oral daily    MEDICATIONS  (PRN):  acetaminophen     Tablet .. 650 milliGRAM(s) Oral every 6 hours PRN Temp greater or equal to 38C (100.4F), Mild Pain (1 - 3)  aluminum hydroxide/magnesium hydroxide/simethicone Suspension 30 milliLiter(s) Oral every 4 hours PRN Dyspepsia  meclizine 25 milliGRAM(s) Oral three times a day PRN for dizziness  melatonin 3 milliGRAM(s) Oral at bedtime PRN Insomnia  OLANZapine Injectable 5 milliGRAM(s) IntraMuscular every 6 hours PRN Agitation  ondansetron Injectable 4 milliGRAM(s) IV Push every 8 hours PRN Nausea and/or Vomiting      Allergies    No Known Allergies    Intolerances    SOCIAL HISTORY:  no tob,   no alcohol   no drugs    FAMILY HISTORY:  Family history of myocardial infarction (Father)    Family history of cerebrovascular accident (CVA) (Father)    Family history of schizophrenia (Mother)    Family history of cardiac disorder (Grandparent)          ROS: 14 point ROS negative other than what is present in HPI or below    Vital Signs Last 24 Hrs  T(C): 37 (29 Nov 2024 17:35), Max: 37.1 (29 Nov 2024 16:30)  T(F): 98.6 (29 Nov 2024 17:35), Max: 98.7 (29 Nov 2024 16:30)  HR: 103 (29 Nov 2024 17:35) (64 - 103)  BP: 156/87 (29 Nov 2024 17:35) (112/57 - 156/87)  BP(mean): 109 (29 Nov 2024 17:35) (75 - 109)  RR: 16 (29 Nov 2024 17:35) (16 - 18)  SpO2: 100% (29 Nov 2024 17:35) (96% - 100%)    Parameters below as of 29 Nov 2024 17:35  Patient On (Oxygen Delivery Method): room air    Physical Exam:  General: Sitting up in bed, agitated     Detailed Neurologic Exam:    Mental status: The patient is awake, alert. Oriented to self, place w/ choices, knows the year is 2024, cannot state the month but knows that yesterday was thanksgiving. Left-right confusion. Able to ID objects, does not reliably follow commands or repeat sentences.     Cranial nerves: Pupils equal and react symmetrically to light. +Blink to threat bilaterally. Extraocular motion is full with no nystagmus. There is no ptosis. Slight L facial asymmetry. Palate elevates symmetrically. Tongue is midline.    Motor: There is normal bulk and tone.  There is no tremor.  Patient does not follow commands for formal strength testing; moves all extremities equally, able to hold all extremities against gravity without drift     Sensation: Intact to light touch in 4 extremities    Cerebellar: There is no dysmetria on finger to nose testing.    Gait : deferred    NIH SS:  DATE: 11/29/24   TIME: 17:30hrs   1A: Level of consciousness (0-3): 0  1B: Questions (0-2): 1  1C: Commands (0-2): 1  2: Gaze (0-2): 0  3: Visual fields (0-3): 0  4: Facial palsy (0-3): 1  MOTOR:  5A: Left arm motor drift (0-4): 0  5B: Right arm motor drift (0-4): 0  6A: Left leg motor drift (0-4): 0  6B: Right leg motor drift (0-4): 0  7: Limb ataxia (0-2): 0  SENSORY:  8: Sensation (0-2): 0  SPEECH:  9: Language (0-3): 1  10: Dysarthria (0-2): 0  EXTINCTION:  11: Extinction/inattention (0-2):     TOTAL SCORE: 4    prehospital mRS=unknown       LABS:                         10.3   2.45  )-----------( 161      ( 29 Nov 2024 05:01 )             33.1       11-29    139  |  101  |  16.5  ----------------------------<  92  3.9   |  27.0  |  0.97    Ca    9.1      29 Nov 2024 05:01    TPro  6.6  /  Alb  3.4  /  TBili  0.4  /  DBili  x   /  AST  28  /  ALT  12  /  AlkPhos  58  11-28    LDL: Pending   A1C: Pending     RADIOLOGY & ADDITIONAL STUDIES (independently reviewed unless otherwise noted):  CT Head, CT Angio Head and Neck, CT Perfusion (11.29.24 @ 16:43)   IMPRESSION:  HEAD CT: No evidence of an acute intracranial hemorrhage, midline shift or   hydrocephalus.  NECK CTA: No hemodynamic significant narrowing within the neck.  BRAIN CTA: No proximal large vessel occlusion.  CT PERFUSION: No areas of ischemia identified.    ---  US Duplex Venous Lower Ext Complete, Bilateral (11.26.24 @ 18:00)   IMPRESSION:  No evidence of deep venous thrombosis in either lower extremity.  --  TTE W or WO Ultrasound Enhancing Agent (11.26.24 @ 21:21)   CONCLUSIONS:   1. Left ventricular systolic function is mildly decreased with an ejection fraction visually estimated at 45 to 50 %.   2. Basal and mid anterior septum is abnormal.   3. There is mild (grade 1) left ventricular diastolic dysfunction.   4. Normal right ventricular cavity size and probably normal right ventricular systolic function.   5. Estimated pulmonary artery systolic pressure is 27 mmHg.   6. Normal left and right atrial size.   7. No pericardial effusion seen.   8. No prior echocardiogram is available for comparison.

## 2024-11-29 NOTE — CONSULT NOTE ADULT - ASSESSMENT
ASSESSMENT:   Patient is a 78 yo F w/ PMH of HTN, CAD s/p SHIRA (10/23), HLD, Sarcoidosis presented to [facility] 11/27/24 with chief complaint of chest pain and shortness of breath.  Patient stated she has been experiencing chest pain and MCCOY for the past year. She had a SHIRA in  10/2023 and she states she has been experiencing these symptoms since. Her symptoms worsened after she was rushing to get into a building and EMS was called. She was observed in the EDOU and Cardiology was consulted. NST was positive. Patient underwent cardiac catheterization 11/29/24.     Code stroke called 11/29/24 for symptoms of altered mental status and expressive aphasia. Last known well was approximately 2:30 PM, at 4:30PM on examination by primary team patient was noted to be confused, not following commands or answering questions appropriately. CT head and CTA head/neck showed no acute abnormalities. On examination by neurology team patient remains confused, disoriented to events surrounding hospitalization, agitated/combative with staff. Due to patient's agitation, unable to obtain lab work to evaluate for elevated aptt in the setting of recent heparin administration during [facility]; given this situation tenecteplase was deferred.     Etiology of symptoms possibly related to hospital delirium, would r/o infectious/metabolic causes once patient amenable to further lab work/testing. May obtain MRI to evaluate for acute infarct.     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), L facial asymmetry  -Continue close monitoring for neurologic deterioration    -ANTITHROMBOTIC THERAPY: May continue with ASA + Plavix   -titrate statin to LDL goal less than 70; on Atorvastatin 40 mg daily, obtain lipid panel and titrate dosing based on results   -MRI Brain w/o once patient amenable   -TTE results as above  -cardiac monitoring w/ telemetry for now, further evaluation pending findings of noted workup         -maintain adequate hydration    -Na Goal: 135-145   -monitor for si/sx of infection   -Check LDL/A1C  -Stroke education     OTHER:  condition and plan of care d/w patient, primary team; questions and concerns addressed.     CORE MEASURES:        Admission NIHSS: n/a      Tenecteplase : [] YES [x] NO      LDL/A1C: Pending      Depression Screen- if depression hx and/or present      Statin Therapy: Atorvastatin 40mg daily      Dysphagia Screen: [x] PASS [] FAIL     Smoking [] YES [x] NO      Afib [] YES [x] NO     Stroke Education [] YES [] NO [x] pending    ASSESSMENT:   Patient is a 80 yo F w/ PMH of HTN, CAD s/p SHIRA (10/23), HLD, Sarcoidosis presented to [facility] 11/27/24 with chief complaint of chest pain and shortness of breath.  Patient stated she has been experiencing chest pain and MCCOY for the past year. She had a SHIRA in  10/2023 and she states she has been experiencing these symptoms since. Her symptoms worsened after she was rushing to get into a building and EMS was called. She was observed in the EDOU and Cardiology was consulted. NST was positive. Patient underwent cardiac catheterization 11/29/24.     Code stroke called 11/29/24 for symptoms of altered mental status and expressive aphasia. Last known well was approximately 2:30 PM, at 4:30PM on examination by primary team patient was noted to be confused, not following commands or answering questions appropriately; NIH=4. CT head and CTA head/neck showed no acute abnormalities. On examination by neurology team patient remains confused, disoriented to events surrounding hospitalization, agitated/combative with staff. Due to patient's agitation, unable to obtain lab work to evaluate for elevated aptt in the setting of recent heparin administration during [facility]; given this situation tenecteplase was deferred.     Etiology of symptoms possibly related to hospital delirium, would r/o infectious/metabolic causes once patient amenable to further lab work/testing. May obtain MRI to evaluate for acute infarct.     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), L facial asymmetry  -Continue close monitoring for neurologic deterioration    -ANTITHROMBOTIC THERAPY: May continue with ASA + Plavix   -titrate statin to LDL goal less than 70; on Atorvastatin 40 mg daily, obtain lipid panel and titrate dosing based on results   -MRI Brain w/o once patient amenable   -TTE results as above  -cardiac monitoring w/ telemetry for now, further evaluation pending findings of noted workup         -maintain adequate hydration    -Na Goal: 135-145   -monitor for si/sx of infection   -Check LDL/A1C  -Stroke education     OTHER:  condition and plan of care d/w patient, primary team; questions and concerns addressed.     CORE MEASURES:        Admission NIHSS: n/a      Tenecteplase : [] YES [x] NO      LDL/A1C: Pending      Depression Screen- if depression hx and/or present      Statin Therapy: Atorvastatin 40mg daily      Dysphagia Screen: [x] PASS [] FAIL     Smoking [] YES [x] NO      Afib [] YES [x] NO     Stroke Education [] YES [] NO [x] pending    ASSESSMENT:   Patient is a 80 yo F w/ PMH of HTN, CAD s/p SHIRA (10/23), HLD, Sarcoidosis presented to [facility] 11/27/24 with chief complaint of chest pain and shortness of breath.  Patient stated she has been experiencing chest pain and MCCOY for the past year. She had a SHIRA in 10/2023 and she states she has been experiencing these symptoms since. Her symptoms worsened after she was rushing to get into a building and EMS was called. She was observed in the EDOU and Cardiology was consulted. NST was positive. Patient underwent cardiac catheterization 11/29/24.     Code stroke called 11/29/24 for symptoms of altered mental status and expressive aphasia. Last known well was approximately 2:30 PM, at 4:30PM on examination by primary team patient was noted to be confused, not following commands or answering questions appropriately; NIH=4. CT head and CTA head/neck showed no acute abnormalities. On examination by neurology team patient remains confused, disoriented to events surrounding hospitalization, agitated/combative with staff. Due to patient's agitation, unable to obtain lab work to evaluate for elevated aptt in the setting of recent heparin administration during [facility]; given this situation tenecteplase was deferred.     Etiology of symptoms possibly related to hospital delirium, would r/o infectious/metabolic causes once patient amenable to further lab work/testing. May obtain MRI to evaluate for acute infarct.     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), L facial asymmetry  -Continue close monitoring for neurologic deterioration    -ANTITHROMBOTIC THERAPY: Continue with ASA + Clopidogrel   -titrate statin to LDL goal less than 70; on Atorvastatin 40 mg daily, obtain lipid panel and titrate dosing based on results   -MRI Brain w/o once patient amenable   -TTE results as above  -cardiac monitoring w/ telemetry for now, further evaluation pending findings of noted workup         -maintain adequate hydration    -Na Goal: 135-145   -monitor for si/sx of infection   -Check LDL/A1C  -Stroke education     OTHER:  condition and plan of care d/w patient, primary team; questions and concerns addressed.     CORE MEASURES:        Admission NIHSS: n/a      Tenecteplase : [] YES [x] NO      LDL/A1C: Pending      Depression Screen- if depression hx and/or present      Statin Therapy: Atorvastatin 40mg daily      Dysphagia Screen: [x] PASS [] FAIL     Smoking [] YES [x] NO      Afib [] YES [x] NO     Stroke Education [] YES [] NO [x] pending

## 2024-11-29 NOTE — DISCHARGE NOTE PROVIDER - NSDCFUADDINST_GEN_ALL_CORE_FT
No heavy lifting, driving, sex, tub baths, swimming, or any activity that submerges the lower half of the body in water for 48 hours.  Limited walking and stairs for 48 hours.    Change the bandaid after 24 hours and every 24 hours after that.  Keep the puncture site dry and covered with a bandaid until a scab forms.    Observe the site frequently.  If bleeding or a large lump (the size of a golf ball or bigger) occurs lie flat, apply continuous direct pressure just above the puncture site for at least 10 minutes, and notify your physician immediately.  If the bleeding cannot be controlled, call 911 immediately for assistance.  Notify your physician of pain, swelling or any drainage.    Notify your physician immediately if coldness, numbness, discoloration or pain in your foot occurs.   fall precaution

## 2024-11-29 NOTE — PROGRESS NOTE ADULT - ASSESSMENT
79y old  Female PMH HTN, HLD, CAD with LAD stent in 2021; SHIRA of ostial LM 10/2023, Sarcoidosis by Vats per pt hx. Presents with one year on chest pain and dyspnea on exertion.  Post SHIRA 10/2023 she still had symptoms of SOB up till now. Today it was raining out and she was rushing to get into a building and she became so dyspneic employees called EMS. In regards to the chest pain it is epigastric and occasional left upper chest wall pain, only occurs with maximal exertion. She also endorses stop taking HCTZ as instructed by her ?cardiologist and ran out of statin. She is not the best historian. Echo revealed mild reduction in LVEF. NST was abnormal. Pt is pending C on 11/29.        Problem/Plan - 1:  ·  Problem: Acute diastolic congestive heart failure.   ·  Plan: - S/p IV push lasix 40 mg x1.   - pt appears euvolemic   - has been off diuretics since July 2024   - CXR clear  - trop x2 25->24.  - proBNP 707  - GDMT: c/w HCTZ 25 mg PO daily and  valsartan 160 mg po daily.    - SGLTi2 on d/c.    - TTE EF 45-50%. Basal and mid anterior septum is abnormal. Grade I DD. Normal RV size and function. PASP 27 mmHg.   - TTE 10/2021 EF 50%. Mild to moderate MR. Mild MR.   - NST 11/27 is abnormal.   - Pending [facility] results from today, 11/29  - Needs OP Cardiology f/u     Problem/Plan - 2:  ·  Problem: CAD (coronary artery disease).   ·  Plan: - CAD with LAD stent in 2021; SHIRA of ostial LM 10/2023  - pt has no complaints of chest pain, chest pressure and anginal equivalent symptoms.    - c/w Plavix 75 mg PO daily and atorvastatin 40 mg PO daily  - c/w Imdur 30 mg PO daily  - Pending [facility] results from today, 11/29      reccs not official until signed by attending physician  79y old  Female PMH HTN, HLD, CAD with LAD stent in 2021; SHIRA of ostial LM 10/2023, Sarcoidosis by Vats per pt hx. Presents with one year on chest pain and dyspnea on exertion.  Post SHIRA 10/2023 she still had symptoms of SOB up till now. Today it was raining out and she was rushing to get into a building and she became so dyspneic employees called EMS. In regards to the chest pain it is epigastric and occasional left upper chest wall pain, only occurs with maximal exertion. She also endorses stop taking HCTZ as instructed by her ?cardiologist and ran out of statin. She is not the best historian. Echo revealed mild reduction in LVEF. NST was abnormal. Pt is pending C on 11/29.        Problem/Plan - 1:  ·  Problem: Acute diastolic congestive heart failure.   ·  Plan: - S/p IV push lasix 40 mg x1.   - pt appears euvolemic   - has been off diuretics since July 2024   - CXR clear  - trop x2 25->24.  - proBNP 707  - GDMT: c/w HCTZ 25 mg PO daily and  valsartan 160 mg po daily.    - SGLTi2 on d/c.    - TTE EF 45-50%. Basal and mid anterior septum is abnormal. Grade I DD. Normal RV size and function. PASP 27 mmHg.   - TTE 10/2021 EF 50%. Mild to moderate MR. Mild MR.   - NST 11/27 is abnormal.   - [facility] results above, no acute changes  - Needs OP Cardiology f/u with Dr. Cortes      Problem/Plan - 2:  ·  Problem: CAD (coronary artery disease).   ·  Plan: - CAD with LAD stent in 2021; SHIRA of ostial LM 10/2023  - pt has no complaints of chest pain, chest pressure and anginal equivalent symptoms.    - c/w Plavix 75 mg PO daily and atorvastatin 40 mg PO daily  - c/w Imdur 30 mg PO daily  - C results above, no acute changes    Cardiology service will sign off, please re-consults if needed      reccs not official until signed by attending physician

## 2024-11-29 NOTE — DISCHARGE NOTE PROVIDER - NSDCMRMEDTOKEN_GEN_ALL_CORE_FT
amLODIPine 10 mg oral tablet: 1 tab(s) orally once a day  aspirin 81 mg oral tablet, chewable: 1 tab(s) orally once a day   atorvastatin 40 mg oral tablet: 1 tab(s) orally once a day (at bedtime)  clopidogrel 75 mg oral tablet: 1 tab(s) orally once a day  hydroCHLOROthiazide 25 mg oral tablet: 1 tab(s) orally once a day  isosorbide mononitrate 30 mg oral tablet, extended release: 1 tab(s) orally once a day  valsartan 160 mg oral tablet: 1 tab(s) orally 2 times a day   amLODIPine 10 mg oral tablet: 0.5 tab(s) orally once a day  aspirin 81 mg oral tablet, chewable: 1 tab(s) orally once a day   atorvastatin 40 mg oral tablet: 1 tab(s) orally once a day (at bedtime)  clopidogrel 75 mg oral tablet: 1 tab(s) orally once a day  dapagliflozin 5 mg oral tablet: 1 tab(s) orally once a day  hydroCHLOROthiazide 25 mg oral tablet: 1 tab(s) orally once a day  isosorbide mononitrate 30 mg oral tablet, extended release: 1 tab(s) orally once a day  metoprolol succinate 25 mg oral tablet, extended release: 1 tab(s) orally once a day (at bedtime)   amLODIPine 5 mg oral tablet: 1 tab(s) orally once a day  aspirin 81 mg oral tablet, chewable: 1 tab(s) orally once a day   atorvastatin 40 mg oral tablet: 1 tab(s) orally once a day (at bedtime)  clopidogrel 75 mg oral tablet: 1 tab(s) orally once a day  dapagliflozin 5 mg oral tablet: 1 tab(s) orally once a day  metoprolol succinate 25 mg oral tablet, extended release: 0.5 tab(s) orally once a day

## 2024-11-29 NOTE — DISCHARGE NOTE PROVIDER - CARE PROVIDER_API CALL
Deny Cortes  Interventional Cardiology  [address], [address]  [place] [address]  Phone: [phone]  Fax: [phone]  Follow Up Time:    Deny Cortes  Interventional Cardiology  [address], [address]  [place] [address]  Phone: [phone]  Fax: [phone]  Follow Up Time: 1 week   Deny Cortes  Interventional Cardiology  [address], [address]  [place] [address]  Phone: [phone]  Fax: [phone]  Follow Up Time: 1 week    Bahman Hubbard  Neurology  [address], [address]  [place] [address]  Phone: [phone]  Fax: [phone]  Follow Up Time: 2 weeks

## 2024-11-29 NOTE — DISCHARGE NOTE PROVIDER - PROVIDER TOKENS
PROVIDER:[TOKEN:[[id number]]] PROVIDER:[TOKEN:[[id number]],FOLLOWUP:[1 week]] PROVIDER:[TOKEN:[[id number]],FOLLOWUP:[1 week]],PROVIDER:[TOKEN:[[id number]],FOLLOWUP:[2 weeks]]

## 2024-11-29 NOTE — CHART NOTE - NSCHARTNOTEFT_GEN_A_CORE
Pt seen at bedside for 2 hour follow up. Patient became increasingly agitated and combative with staff. Pt demanding to call 911 to leave the hospital because she did not trust staff. She began making nonsensical statements in regard to where she was. DaughterBay was contacted and updated about the ongoing situation. She attempted to redirect the pt but was unsuccessful. Upon further discussion with the daughter, she reports that the pt has been displaying ongoing spontaneous bouts of confusion for the past 2 weeks.    Labs delayed secondary to pt being a difficult stick and safety given pt agitation. Pt was given Zyprexa for increased agitation. RN staff to attempt blood draw once safe for pt and staff.       Vital Signs Last 24 Hrs  T(C): 37 (29 Nov 2024 17:35), Max: 37.1 (29 Nov 2024 16:30)  T(F): 98.6 (29 Nov 2024 17:35), Max: 98.7 (29 Nov 2024 16:30)  HR: 103 (29 Nov 2024 17:35) (64 - 103)  BP: 156/87 (29 Nov 2024 17:35) (112/57 - 156/87)  BP(mean): 109 (29 Nov 2024 17:35) (75 - 109)  RR: 16 (29 Nov 2024 17:35) (16 - 18)  SpO2: 100% (29 Nov 2024 17:35) (96% - 100%)    Parameters below as of 29 Nov 2024 17:35  Patient On (Oxygen Delivery Method): room air      PHYSICAL EXAM:  GENERAL: Patient appears agitated, lying in bed; NAD  LUNGS: Good air entry bilaterally, clear to auscultation b/l, symmetric breath sounds; No wheezing or rhonchi appreciated, unlabored respirations  HEART: Regular rate and rhythm  NEUROLOGIC: Awake, alert & oriented X 1 (self), does not follow commands secondary to agitation, no gross neuro deficits appreciated  PSYCHIATRIC: Paranoid thoughts       A/P: 79y old Female s/p code stroke.   - Imaging negative for acute changes  - Stroke team following   - Continue w/ SDU  - Continue w/ q2 neuro checks  - Daughter updated and aware  - Zyprexa 5mg IM q6hrs prn for agitation  - Night team to follow up labs once drawn  - Case discussed w/ attending     DISPOSITION:  - Continue to observe

## 2024-11-29 NOTE — PROGRESS NOTE ADULT - SUBJECTIVE AND OBJECTIVE BOX
Farideh Stearns M.D.    Patient is a 79y old  Female who presents with a chief complaint of Chest pain (29 Nov 2024 10:05)      SUBJECTIVE / OVERNIGHT EVENTS: reports very bad headache post cath.     MEDICATIONS  (STANDING):  amLODIPine   Tablet 10 milliGRAM(s) Oral daily  atorvastatin 40 milliGRAM(s) Oral at bedtime  clopidogrel Tablet 75 milliGRAM(s) Oral daily  heparin   Injectable 5000 Unit(s) SubCutaneous every 12 hours  hydrochlorothiazide 25 milliGRAM(s) Oral daily  isosorbide   mononitrate ER Tablet (IMDUR) 60 milliGRAM(s) Oral daily  valsartan 160 milliGRAM(s) Oral daily    MEDICATIONS  (PRN):  acetaminophen     Tablet .. 650 milliGRAM(s) Oral every 6 hours PRN Temp greater or equal to 38C (100.4F), Mild Pain (1 - 3)  aluminum hydroxide/magnesium hydroxide/simethicone Suspension 30 milliLiter(s) Oral every 4 hours PRN Dyspepsia  meclizine 25 milliGRAM(s) Oral three times a day PRN for dizziness  melatonin 3 milliGRAM(s) Oral at bedtime PRN Insomnia  ondansetron Injectable 4 milliGRAM(s) IV Push every 8 hours PRN Nausea and/or Vomiting      I&O's Summary    28 Nov 2024 07:01  -  29 Nov 2024 07:00  --------------------------------------------------------  IN: 642 mL / OUT: 650 mL / NET: -8 mL        PHYSICAL EXAM:  Vital Signs Last 24 Hrs  T(C): 36.6 (29 Nov 2024 12:04), Max: 36.8 (28 Nov 2024 21:27)  T(F): 97.9 (29 Nov 2024 12:04), Max: 98.2 (28 Nov 2024 21:27)  HR: 70 (29 Nov 2024 12:04) (64 - 100)  BP: 152/82 (29 Nov 2024 12:04) (102/60 - 152/82)  BP(mean): 91 (29 Nov 2024 05:21) (74 - 91)  RR: 17 (29 Nov 2024 12:04) (16 - 18)  SpO2: 99% (29 Nov 2024 12:04) (95% - 100%)    Parameters below as of 29 Nov 2024 12:04  Patient On (Oxygen Delivery Method): room air    CONSTITUTIONAL: NAD, well-groomed  ENMT: Moist oral mucosa, no pharyngeal injection or exudates; normal dentition  RESPIRATORY: Normal respiratory effort; lungs are clear to auscultation bilaterally  CARDIOVASCULAR: Regular rate and rhythm, no LE edema  ABDOMEN: Nontender to palpation, normoactive bowel sounds  PSYCH: A+O x3; affect appropriate    LABS:                        10.3   2.45  )-----------( 161      ( 29 Nov 2024 05:01 )             33.1     11-29    139  |  101  |  16.5  ----------------------------<  92  3.9   |  27.0  |  0.97    Ca    9.1      29 Nov 2024 05:01    TPro  6.6  /  Alb  3.4  /  TBili  0.4  /  DBili  x   /  AST  28  /  ALT  12  /  AlkPhos  58  11-28          Urinalysis Basic - ( 29 Nov 2024 05:01 )    Color: x / Appearance: x / SG: x / pH: x  Gluc: 92 mg/dL / Ketone: x  / Bili: x / Urobili: x   Blood: x / Protein: x / Nitrite: x   Leuk Esterase: x / RBC: x / WBC x   Sq Epi: x / Non Sq Epi: x / Bacteria: x        CAPILLARY BLOOD GLUCOSE          RADIOLOGY & ADDITIONAL TESTS:  Results Reviewed:   Imaging Personally Reviewed:  Electrocardiogram Personally Reviewed:

## 2024-11-29 NOTE — CONSULT NOTE ADULT - NS ATTEND AMEND GEN_ALL_CORE FT
I have seen and examined the patient and agree with the assessment and plan as documented. CP/SOB here for C.
agree with above,    SOB ongoing for >1 year, has slowly been weaned off many outpatient Anti-hypertensives and diuretics as outpatient  SOB is with more than ordinary activity, not baseline activity.    Recommend update 2d TTE  no ischemic evaluation at this time unless 2d TTE is abnormal from previous  She should follow with outpatient cardiologist for further evaluation pending 2d TTE  she understands and agrees.    please call with concerns.
This is a 79 year-old lady with a PMH of HTN, CAD (s/p stent 10/23) and sarcoidosis, who initially presented on 11/27 with c/c of a chest pain and SOB. Her NST was positive. Yesterday she underwent a LHC, no stents were placed, however, she received heparin IV bolus during the procedure. Code stroke was activated for a new-onset confusion. LNW was around 1400, NIHSS of 4. Within tenecteplase window, however, thrombolytics deferred due to heparin IV bolus and low suspicion for acute CVA. CTH demonstrated an old hypodensity (seen on prior MR) in the left parietal area. CTP/CTA negative for an acute pathology. Notably, patient's cerebral vasculature is remarkably intact. On my exam today she has mild word-finding difficulties, however, she is inattentive, which is more suggestive of a delirium rather than CVA. NIHSS of 1. I didn't appreciate a left facial asymmetry mentioned earlier. Overall, CVA is less likely, however due to recent cardiac cath and persistent word-finding difficulties it would be prudent to obtain MR brain.     - NC q4 for the next 24 hours  - SBP goal - normotension   - Continue ASA/Clopidogrel   - MR brain to rule out CVA    Rj Goddard MD

## 2024-11-30 LAB
A1C WITH ESTIMATED AVERAGE GLUCOSE RESULT: 5.9 % — HIGH (ref 4–5.6)
ANION GAP SERPL CALC-SCNC: 11 MMOL/L — SIGNIFICANT CHANGE UP (ref 5–17)
ANION GAP SERPL CALC-SCNC: 12 MMOL/L — SIGNIFICANT CHANGE UP (ref 5–17)
BLD GP AB SCN SERPL QL: SIGNIFICANT CHANGE UP
BUN SERPL-MCNC: 12.7 MG/DL — SIGNIFICANT CHANGE UP (ref 8–20)
BUN SERPL-MCNC: 13 MG/DL — SIGNIFICANT CHANGE UP (ref 8–20)
CALCIUM SERPL-MCNC: 9.1 MG/DL — SIGNIFICANT CHANGE UP (ref 8.4–10.5)
CALCIUM SERPL-MCNC: 9.1 MG/DL — SIGNIFICANT CHANGE UP (ref 8.4–10.5)
CHLORIDE SERPL-SCNC: 101 MMOL/L — SIGNIFICANT CHANGE UP (ref 96–108)
CHLORIDE SERPL-SCNC: 102 MMOL/L — SIGNIFICANT CHANGE UP (ref 96–108)
CHOLEST SERPL-MCNC: 200 MG/DL — HIGH
CO2 SERPL-SCNC: 23 MMOL/L — SIGNIFICANT CHANGE UP (ref 22–29)
CO2 SERPL-SCNC: 26 MMOL/L — SIGNIFICANT CHANGE UP (ref 22–29)
CREAT SERPL-MCNC: 0.84 MG/DL — SIGNIFICANT CHANGE UP (ref 0.5–1.3)
CREAT SERPL-MCNC: 0.94 MG/DL — SIGNIFICANT CHANGE UP (ref 0.5–1.3)
EGFR: 62 ML/MIN/1.73M2 — SIGNIFICANT CHANGE UP
EGFR: 71 ML/MIN/1.73M2 — SIGNIFICANT CHANGE UP
ESTIMATED AVERAGE GLUCOSE: 123 MG/DL — HIGH (ref 68–114)
GLUCOSE SERPL-MCNC: 87 MG/DL — SIGNIFICANT CHANGE UP (ref 70–99)
GLUCOSE SERPL-MCNC: 95 MG/DL — SIGNIFICANT CHANGE UP (ref 70–99)
HCT VFR BLD CALC: 29.2 % — LOW (ref 34.5–45)
HCT VFR BLD CALC: 33.9 % — LOW (ref 34.5–45)
HDLC SERPL-MCNC: 88 MG/DL — SIGNIFICANT CHANGE UP
HGB BLD-MCNC: 10.7 G/DL — LOW (ref 11.5–15.5)
HGB BLD-MCNC: 9.2 G/DL — LOW (ref 11.5–15.5)
LIPID PNL WITH DIRECT LDL SERPL: 103 MG/DL — HIGH
MCHC RBC-ENTMCNC: 26.4 PG — LOW (ref 27–34)
MCHC RBC-ENTMCNC: 26.4 PG — LOW (ref 27–34)
MCHC RBC-ENTMCNC: 31.5 G/DL — LOW (ref 32–36)
MCHC RBC-ENTMCNC: 31.6 G/DL — LOW (ref 32–36)
MCV RBC AUTO: 83.7 FL — SIGNIFICANT CHANGE UP (ref 80–100)
MCV RBC AUTO: 83.9 FL — SIGNIFICANT CHANGE UP (ref 80–100)
NON HDL CHOLESTEROL: 112 MG/DL — SIGNIFICANT CHANGE UP
PLATELET # BLD AUTO: 150 K/UL — SIGNIFICANT CHANGE UP (ref 150–400)
PLATELET # BLD AUTO: 156 K/UL — SIGNIFICANT CHANGE UP (ref 150–400)
POTASSIUM SERPL-MCNC: 3.4 MMOL/L — LOW (ref 3.5–5.3)
POTASSIUM SERPL-MCNC: 3.8 MMOL/L — SIGNIFICANT CHANGE UP (ref 3.5–5.3)
POTASSIUM SERPL-SCNC: 3.4 MMOL/L — LOW (ref 3.5–5.3)
POTASSIUM SERPL-SCNC: 3.8 MMOL/L — SIGNIFICANT CHANGE UP (ref 3.5–5.3)
RBC # BLD: 3.48 M/UL — LOW (ref 3.8–5.2)
RBC # BLD: 4.05 M/UL — SIGNIFICANT CHANGE UP (ref 3.8–5.2)
RBC # FLD: 17.5 % — HIGH (ref 10.3–14.5)
RBC # FLD: 17.6 % — HIGH (ref 10.3–14.5)
SODIUM SERPL-SCNC: 135 MMOL/L — SIGNIFICANT CHANGE UP (ref 135–145)
SODIUM SERPL-SCNC: 140 MMOL/L — SIGNIFICANT CHANGE UP (ref 135–145)
TRIGL SERPL-MCNC: 46 MG/DL — SIGNIFICANT CHANGE UP
WBC # BLD: 3.01 K/UL — LOW (ref 3.8–10.5)
WBC # BLD: 3.17 K/UL — LOW (ref 3.8–10.5)
WBC # FLD AUTO: 3.01 K/UL — LOW (ref 3.8–10.5)
WBC # FLD AUTO: 3.17 K/UL — LOW (ref 3.8–10.5)

## 2024-11-30 PROCEDURE — 99233 SBSQ HOSP IP/OBS HIGH 50: CPT

## 2024-11-30 PROCEDURE — 93010 ELECTROCARDIOGRAM REPORT: CPT

## 2024-11-30 PROCEDURE — 99232 SBSQ HOSP IP/OBS MODERATE 35: CPT

## 2024-11-30 RX ORDER — METOPROLOL TARTRATE 100 MG/1
25 TABLET, FILM COATED ORAL AT BEDTIME
Refills: 0 | Status: DISCONTINUED | OUTPATIENT
Start: 2024-11-30 | End: 2024-12-02

## 2024-11-30 RX ORDER — SODIUM CHLORIDE 9 MG/ML
750 INJECTION, SOLUTION INTRAMUSCULAR; INTRAVENOUS; SUBCUTANEOUS ONCE
Refills: 0 | Status: COMPLETED | OUTPATIENT
Start: 2024-11-30 | End: 2024-11-30

## 2024-11-30 RX ADMIN — Medication 5000 UNIT(S): at 18:20

## 2024-11-30 RX ADMIN — Medication 50 MILLILITER(S): at 21:16

## 2024-11-30 RX ADMIN — Medication 30 MILLIGRAM(S): at 13:17

## 2024-11-30 RX ADMIN — VALSARTAN 160 MILLIGRAM(S): 320 TABLET ORAL at 05:41

## 2024-11-30 RX ADMIN — SODIUM CHLORIDE 1000 MILLILITER(S): 9 INJECTION, SOLUTION INTRAMUSCULAR; INTRAVENOUS; SUBCUTANEOUS at 20:55

## 2024-11-30 RX ADMIN — CLOPIDOGREL 75 MILLIGRAM(S): 75 TABLET, FILM COATED ORAL at 13:16

## 2024-11-30 RX ADMIN — Medication 5000 UNIT(S): at 05:40

## 2024-11-30 RX ADMIN — Medication 40 MILLIGRAM(S): at 20:54

## 2024-11-30 RX ADMIN — ACETAMINOPHEN 500MG 650 MILLIGRAM(S): 500 TABLET, COATED ORAL at 18:19

## 2024-11-30 RX ADMIN — DAPAGLIFLOZIN 5 MILLIGRAM(S): 10 TABLET, FILM COATED ORAL at 13:17

## 2024-11-30 RX ADMIN — AMLODIPINE BESYLATE 10 MILLIGRAM(S): 10 TABLET ORAL at 13:18

## 2024-11-30 RX ADMIN — Medication 81 MILLIGRAM(S): at 13:16

## 2024-11-30 RX ADMIN — Medication 50 MILLILITER(S): at 22:24

## 2024-11-30 NOTE — PROGRESS NOTE ADULT - ASSESSMENT
79y old  Female PMH HTN, HLD, CAD with LAD stent in 2021; SHIRA of ostial LM 10/2023, Sarcoidosis by Vats per pt hx. Presents with one year on chest pain and dyspnea on exertion.  Post SHIRA 10/2023   . Echo revealed mild reduction in LVEF. NST was abnormal. Pt underwent LHC on 11/29/24 which revealed cath revealed Ostial Stent patnet, luminal irregularities  LAD: Prox 100% stenosis (, closed mid LAD stent) s/p IVUS and POBA(IVUS showing severe plaque in stents andpossible dissection plane)   LCX: Mild diffuse disease  RCA: Mild diffuse disease, collaterals going to the apical LAD  LV EDP 16     Code stroke called 11/29/24 in few hours of pst cath,  for symptoms of altered mental status and expressive aphasia. Last known well was approximately 2:30 PM, at 4:30PM primary team noted patient to be confused, not following commands or answering questions appropriately; NIH=4. CT head and CTA head/neck showed no acute abnormalities.  patient currently with no focal changes, A&OX3, follows verbal commands, in no distress, CP free   received zyprexa yesterday for agitation with good response.  AMS likely from ? sedation  received for cath       Problem/Plan - 1:      Problem: CAD (coronary artery disease).   ·  Plan: - CAD with LAD stent in 2021; SHIRA of ostial LM 10/2023, post yesterday with  Prox 100% stenosis (, closed mid LAD stent) s/p IVUS and POBA(IVUS showing severe plaque in stents and possible dissection plane)   - pt has no complaints of chest pain, chest pressure and anginal equivalent symptoms at this time  - c/w Aspirin 81mg po daily, Plavix 75 mg PO daily and atorvastatin 40 mg PO daily  - c/w anti anginals Imdur 30 mg PO daily  -Will plan for PCI OF  of LAD in upcoming weeks  -F/U lipid profile and a1c (sent today)   -LDL GOIAL < 70  -DASH diet  -RF modification explained to the patient and  she verbalized understanding   -patient to follow up with DR Cortes in 1-2 weeks      ·  Problem: Acute diastolic congestive heart failure.   ·  Plan  - pt appears euvolemic   - has been off diuretics since July 2024   - CXR clear  - proBNP 707  - GDMT: c/w HCTZ 25 mg PO daily,  - SGLTi2 on Farxiga    -C/W Amlodipine   -will DC losartan and start Toprol xl 25mg po daily with parameters   - TTE EF 45-50%. Basal and mid anterior septum is abnormal. Grade I DD. Normal RV size and function. PASP 27 mmHg.   - [facility] results with  of LAD, plan for OP PCI of  of LAD       Cardiology service will sign off, please re-consult as necessary

## 2024-11-30 NOTE — PROGRESS NOTE ADULT - SUBJECTIVE AND OBJECTIVE BOX
Patient is a 79y old  Female who presents with a chief complaint of Chest pain (30 Nov 2024 10:25)      pt denies headache,slurry speech, weakness,numbness,cp,sob  AAOX3 now  REVIEW OF SYSTEMS: All systems are reviewed and found to be negative except above    MEDICATIONS  (STANDING):  amLODIPine   Tablet 10 milliGRAM(s) Oral daily  aspirin  chewable 81 milliGRAM(s) Oral daily  atorvastatin 40 milliGRAM(s) Oral at bedtime  clopidogrel Tablet 75 milliGRAM(s) Oral daily  dapagliflozin 5 milliGRAM(s) Oral daily  heparin   Injectable 5000 Unit(s) SubCutaneous every 12 hours  hydrochlorothiazide 25 milliGRAM(s) Oral daily  isosorbide   mononitrate ER Tablet (IMDUR) 30 milliGRAM(s) Oral daily  metoprolol succinate ER 25 milliGRAM(s) Oral at bedtime  sodium chloride 0.9%. 1000 milliLiter(s) (100 mL/Hr) IV Continuous <Continuous>    MEDICATIONS  (PRN):  acetaminophen     Tablet .. 650 milliGRAM(s) Oral every 6 hours PRN Temp greater or equal to 38C (100.4F), Mild Pain (1 - 3)  aluminum hydroxide/magnesium hydroxide/simethicone Suspension 30 milliLiter(s) Oral every 4 hours PRN Dyspepsia  meclizine 25 milliGRAM(s) Oral three times a day PRN for dizziness  melatonin 3 milliGRAM(s) Oral at bedtime PRN Insomnia  OLANZapine Injectable 5 milliGRAM(s) IntraMuscular every 6 hours PRN Agitation  ondansetron Injectable 4 milliGRAM(s) IV Push every 8 hours PRN Nausea and/or Vomiting      CAPILLARY BLOOD GLUCOSE  121 (29 Nov 2024 16:46)      POCT Blood Glucose.: 122 mg/dL (29 Nov 2024 16:14)    I&O's Summary    29 Nov 2024 07:01  -  30 Nov 2024 07:00  --------------------------------------------------------  IN: 525 mL / OUT: 0 mL / NET: 525 mL        PHYSICAL EXAM:  Vital Signs Last 24 Hrs  T(C): 36.7 (30 Nov 2024 08:00), Max: 37.1 (29 Nov 2024 16:30)  T(F): 98 (30 Nov 2024 08:00), Max: 98.7 (29 Nov 2024 16:30)  HR: 75 (30 Nov 2024 10:00) (60 - 103)  BP: 116/59 (30 Nov 2024 10:00) (98/54 - 158/92)  BP(mean): 75 (30 Nov 2024 10:00) (66 - 109)  RR: 18 (30 Nov 2024 10:00) (15 - 18)  SpO2: 100% (30 Nov 2024 10:00) (96% - 100%)    Parameters below as of 30 Nov 2024 10:00  Patient On (Oxygen Delivery Method): room air        CONSTITUTIONAL: NAD,  EYES: PERRLA; conjunctiva and sclera clear  ENMT: Moist oral mucosa,   RESPIRATORY: Normal respiratory effort; lungs are clear to auscultation bilaterally  CARDIOVASCULAR: Regular rate and rhythm, normal S1 and S2, no murmur   EXTS: No lower extremity edema; Peripheral pulses are 2+ bilaterally  ABDOMEN: Nontender to palpation, normoactive bowel sounds, no rebound/guarding;   MUSCLOSKELETAL:   no joint swelling or tenderness to palpation  PSYCH: affect appropriate  NEUROLOGY: A+O to person, place, and time; CN 2-12 are intact and symmetric; no gross sensory deficits;       LABS:                        10.7   3.01  )-----------( 156      ( 30 Nov 2024 06:55 )             33.9     11-30    140  |  102  |  13.0  ----------------------------<  87  3.8   |  26.0  |  0.94    Ca    9.1      30 Nov 2024 06:55    TPro  7.3  /  Alb  3.6  /  TBili  0.4  /  DBili  x   /  AST  23  /  ALT  11  /  AlkPhos  65  11-29    PT/INR - ( 29 Nov 2024 19:20 )   PT: 11.6 sec;   INR: 1.00 ratio         PTT - ( 29 Nov 2024 19:20 )  PTT:35.4 sec      Urinalysis Basic - ( 30 Nov 2024 06:55 )    Color: x / Appearance: x / SG: x / pH: x  Gluc: 87 mg/dL / Ketone: x  / Bili: x / Urobili: x   Blood: x / Protein: x / Nitrite: x   Leuk Esterase: x / RBC: x / WBC x   Sq Epi: x / Non Sq Epi: x / Bacteria: x          RADIOLOGY & ADDITIONAL TESTS:  Results Reviewed:   Imaging Personally Reviewed:  Electrocardiogram Personally Reviewed:    COORDINATION OF CARE:  Care Discussed with Consultants/Other Providers [Y/N]:  Prior or Outpatient Records Reviewed [Y/N]:

## 2024-11-30 NOTE — CHART NOTE - NSCHARTNOTEFT_GEN_A_CORE
PA NOTE-MEDICINE    Called by RN due to Pt Hypotensive with Manual BP: 84/46. Pt asymptomatic, Denies: CP Dizziness     T(C): 36.8 (30 Nov 2024 19:38), Max: 36.8 (30 Nov 2024 19:38)  T(F): 98.2 (30 Nov 2024 19:38), Max: 98.2 (30 Nov 2024 19:38)  HR: 76 (30 Nov 2024 20:00) (60 - 91)  BP: 84/46 (30 Nov 2024 20:15) (74/56 - 146/76)  BP(mean): 56 (30 Nov 2024 20:00) (56 - 95)  RR: 16 (30 Nov 2024 20:00) (16 - 18)  SpO2: 100% (30 Nov 2024 20:00) (94% - 100%) ra      cc Bolus x 1 Stat  Albumin 25%/50 ccs x 2 Stat  Rn to Repeat BP in 20 min and recall PA with results   Will Follow   Recall PA for any changes in Pt status

## 2024-11-30 NOTE — PROGRESS NOTE ADULT - ASSESSMENT
Patient is a 80 yo F w/ PMH of HTN, CAD s/p SHIRA (10/23), HLD, Sarcoidosis presenting with chief complaint of chest pain and shortness of breath. 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 S/P IV Lasix, TTE EF 45-50%. S/P [facility]  11/29 no acute change.continue GDMT,ASA,Plavix. Code stroke called 11/29/24 for symptoms of altered mental status and expressive aphasia.CTH stable.    Altered mental status and expressive aphasia -resolved now  -S/P CODE stroke called 11/29/24  -cth stable  -continue asa,plavix,statin  -tte stable  -neurocheck  -mri brain        Acute on chronic HFmrEF exacerbation - now euvolemic on exam   Chest pain LIKELY SEC TO ABOVE, Hx of CAD s/p SHIRA  - Telemetry  - S/P IV LASIX  - GDMT  - NST positive  - S/P [facility] 11/29.STABLE  - Cardiology following  - TTE w/ LVEF of 45-50%, basal and mid anterior septum abnormal  - Trop 25-24  - ProBNP 707  - Monitor I&Os, daily weights, fluid/salt restriction  - SGLT2i on discharge  - s/p LHC -- no intervention, DAPT x 3 months    Headache  - suspect 2/2 recent anesthesia   - PRN Tylenol  -  CTH stable    Hx of HTN  - Resume home medications    HLD  - Continue Statin    DVT ppx: Heparin SQ  Dispo: pending MRI brain  Plan of care lily pt. Pt is AAOX3 now. She does not want to involve her childrens in her care at this moment. She does not want me to call family.

## 2024-11-30 NOTE — PROGRESS NOTE ADULT - SUBJECTIVE AND OBJECTIVE BOX
Department of Cardiology                                                                  [facility]/[place] [address]  [id number]                                                            Telephone: [phone]. Fax:[phone]                                                                                      Cardiac Cath NP Note           Patient is a 79y old  Female who presents with a chief complaint of Chest pain (2024 17:51)    Chief Complaint upon presentation to hospital: SOB, CP  Initial reason for Consult: SOB  Reason for follow up: s/p LHC yesterday, COde stroke post  cath   cath revealed Ostial Stent patnet, luminal irregularities  LAD: Prox 100% stenosis (, closed mid LAD stent) s/p IVUS and POBA(IVUS showing severe plaque in stents andpossible dissection plane)   LCX: Mild diffuse disease  RCA: Mild diffuse disease, collaterals going to the apical LAD  LV EDP 16     Overall Plan: Optimize medical management post cath     Evaluated the patient at bedside, patient endorses no complaints at thia time, states that she became AMs yesterday from the medicationshe received yesterday   Currently A&OX3, pleasant active   Review of symptoms:   Cardiac:  No chest pain. No dyspnea. No palpitations.  Respiratory: no cough. No dyspnea  Gastrointestinal: No diarrhea. No abdominal pain. No bleeding.   Neuro: No focal neuro complaints.      PAST MEDICAL & SURGICAL HISTORY:  COPD, mild      Brain aneurysm      Chronic arthritis or osteoarthritis      Rheumatoid arthritis      Primary hypertension      Pulmonary sarcoidosis      S/P hip replacement, left      History of surgery on arm      History of hand surgery      History of breast biopsy      History of lung biopsy      History of foot surgery          RADIOLOGY & ADDITIONAL STUDIES/DIAGNOSTIC TESTING:      ECHO  FINDINGS:    < from: TTE W or WO Ultrasound Enhancing Agent (24 @ 21:21) >   1. Left ventricular systolic function is mildly decreased with an ejection fraction visually estimated at 45 to 50 %.   2. Basal and mid anterior septum is abnormal.   3. There is mild (grade 1) left ventricular diastolic dysfunction.   4. Normal right ventricular cavity size and probably normal right ventricular systolic function.   5. Estimated pulmonary artery systolic pressure is 27 mmHg.   6. Normal left and right atrial size.   7. No pericardial effusion seen.   8. No prior echocardiogram is available for comparison.    < end of copied text >        CATHETERIZATION  FINDINGS:    < from: Cardiac Catheterization (24 @ 08:25) >  Conclusions:   Syntax Score: Intermediate     LM: Ostial Stent patnet, luminal irregularities    LAD: Prox 100% stenosis (, closed mid LAD stent) s/p IVUS and POBA  (IVUS showing severe plaque in stents and  possible dissection plane)   LCX: Mild diffuse disease    RCA: Mild diffuse disease, collaterals going to the apical LAD    LV EDP 16   Recommendations:     Medical management, consider  intervention in 6 weeks if  symptomatic  Acute complication:    No complications, No complications     < end of copied text >    < from: CT Brain Perfusion Maps Stroke (24 @ 17:00) >    IMPRESSION:  HEAD CT: No evidence of an acute intracranial hemorhage, midline shift or   hydrocephalus.  NECK CTA: No hemodynamic significant narowing within the neck.  BRAIN CTA: No proximal large vessel occlusion.  CT PERFUSION: No areas of ischemia identified.    Preliminary head CT findings discussed with PA Andrew at 4:54 PM on  2024    < end of copied text >      Allergies    No Known Allergies    Intolerances        MEDICATIONS  (STANDING):  amLODIPine   Tablet 10 milliGRAM(s) Oral daily  aspirin  chewable 81 milliGRAM(s) Oral daily  atorvastatin 40 milliGRAM(s) Oral at bedtime  clopidogrel Tablet 75 milliGRAM(s) Oral daily  dapagliflozin 5 milliGRAM(s) Oral daily  heparin   Injectable 5000 Unit(s) SubCutaneous every 12 hours  hydrochlorothiazide 25 milliGRAM(s) Oral daily  isosorbide   mononitrate ER Tablet (IMDUR) 30 milliGRAM(s) Oral daily  sodium chloride 0.9%. 1000 milliLiter(s) (100 mL/Hr) IV Continuous <Continuous>  valsartan 160 milliGRAM(s) Oral daily    MEDICATIONS  (PRN):  acetaminophen     Tablet .. 650 milliGRAM(s) Oral every 6 hours PRN Temp greater or equal to 38C (100.4F), Mild Pain (1 - 3)  aluminum hydroxide/magnesium hydroxide/simethicone Suspension 30 milliLiter(s) Oral every 4 hours PRN Dyspepsia  meclizine 25 milliGRAM(s) Oral three times a day PRN for dizziness  melatonin 3 milliGRAM(s) Oral at bedtime PRN Insomnia  OLANZapine Injectable 5 milliGRAM(s) IntraMuscular every 6 hours PRN Agitation  ondansetron Injectable 4 milliGRAM(s) IV Push every 8 hours PRN Nausea and/or Vomiting      Vital Signs Last 24 Hrs  T(C): 36.7 (2024 08:00), Max: 37.1 (2024 16:30)  T(F): 98 (2024 08:00), Max: 98.7 (2024 16:30)  HR: 62 (:) (60 - 103)  BP: 106/59 (:) (98/54 - 158/92)  BP(mean): 74 (:) (66 - 109)  RR: 18 () (15 - 18)  SpO2: 100% () (96% - 100%)    Parameters below as of   Patient On (Oxygen Delivery Method): room air        Daily     Daily Weight in k.6 (2024 04:00)    I&O's Detail    2024 07:01  -  2024 07:00  --------------------------------------------------------  IN:    Oral Fluid: 525 mL  Total IN: 525 mL    OUT:  Total OUT: 0 mL    Total NET: 525 mL          PHYSICAL EXAM:   GENERAL: Pt lying comfortably, NAD.  ENMT: PERRL, +EOMI.  NECK: soft, Supple, No JVD,   CHEST/LUNG: Clear to auscultatation bilaterally; No wheezing.  HEART: S1S2+, Regular rate and rhythm; No murmurs.  ABDOMEN: Soft, Nontender, Nondistended; Bowel sounds present.  MUSCULOSKELETAL: Normal range of motion.  SKIN: No rashes or lesions.  NEURO: AAOX3, no focal deficits, no motor r sensory loss.  PSYCH: normal mood.  VASCULAR:   Radial +2 R/+2 L  Femoral +2 R/+2 L  PT +2 R/+2 L  DP +2 R/+2 L      INTERPRETATION OF TELEMETRY:    ECG: SR, ocacsional PVC's, no acute ST/T changes     LABS:                        10.7   3.01  )-----------( 156      ( 2024 06:55 )             33.9     11-30    140  |  102  |  13.0  ----------------------------<  87  3.8   |  26.0  |  0.94    Ca    9.1      2024 06:55    TPro  7.3  /  Alb  3.6  /  TBili  0.4  /  DBili  x   /  AST  23  /  ALT  11  /  AlkPhos  65  -        PT/INR - ( 2024 19:20 )   PT: 11.6 sec;   INR: 1.00 ratio         PTT - ( 2024 19:20 )  PTT:35.4 sec  Urinalysis Basic - ( 2024 06:55 )    Color: x / Appearance: x / SG: x / pH: x  Gluc: 87 mg/dL / Ketone: x  / Bili: x / Urobili: x   Blood: x / Protein: x / Nitrite: x   Leuk Esterase: x / RBC: x / WBC x   Sq Epi: x / Non Sq Epi: x / Bacteria: x      I&O's Summary    2024 07:01  -  2024 07:00  --------------------------------------------------------  IN: 525 mL / OUT: 0 mL / NET: 525 mL      BNP: 707 (24)                                                                                         Department of Cardiology                                                                  [facility]/[place] [address]  [place]                                                            Telephone: [phone]. Fax:[phone]                                                                                      Cardiac Cath NP Note           Patient is a 79y old  Female who presents with a chief complaint of Chest pain (2024 17:51)    Chief Complaint upon presentation to hospital: SOB, CP  Initial reason for Consult: SOB  Reason for follow up: s/p LHC yesterday, COde stroke post  cath   cath revealed Ostial Stent patnet, luminal irregularities  LAD: Prox 100% stenosis (, closed mid LAD stent) s/p IVUS and POBA(IVUS showing severe plaque in stents andpossible dissection plane)   LCX: Mild diffuse disease  RCA: Mild diffuse disease, collaterals going to the apical LAD  LV EDP 16     Overall Plan: Optimize medical management post cath     Evaluated the patient at bedside, patient endorses no complaints at thia time, states that she became AMs yesterday from the medicationshe received yesterday   Currently A&OX3, pleasant active   Review of symptoms:   Cardiac:  No chest pain. No dyspnea. No palpitations.  Respiratory: no cough. No dyspnea  Gastrointestinal: No diarrhea. No abdominal pain. No bleeding.   Neuro: No focal neuro complaints.      PAST MEDICAL & SURGICAL HISTORY:  COPD, mild      Brain aneurysm      Chronic arthritis or osteoarthritis      Rheumatoid arthritis      Primary hypertension      Pulmonary sarcoidosis      S/P hip replacement, left      History of surgery on arm      History of hand surgery      History of breast biopsy      History of lung biopsy      History of foot surgery          RADIOLOGY & ADDITIONAL STUDIES/DIAGNOSTIC TESTING:      ECHO  FINDINGS:    < from: TTE W or WO Ultrasound Enhancing Agent (24 @ 21:21) >   1. Left ventricular systolic function is mildly decreased with an ejection fraction visually estimated at 45 to 50 %.   2. Basal and mid anterior septum is abnormal.   3. There is mild (grade 1) left ventricular diastolic dysfunction.   4. Normal right ventricular cavity size and probably normal right ventricular systolic function.   5. Estimated pulmonary artery systolic pressure is 27 mmHg.   6. Normal left and right atrial size.   7. No pericardial effusion seen.   8. No prior echocardiogram is available for comparison.    < end of copied text >        CATHETERIZATION  FINDINGS:    < from: Cardiac Catheterization (24 @ 08:25) >  Conclusions:   Syntax Score: Intermediate     LM: Ostial Stent patnet, luminal irregularities    LAD: Prox 100% stenosis (, closed mid LAD stent) s/p IVUS and POBA  (IVUS showing severe plaque in stents and  possible dissection plane)   LCX: Mild diffuse disease    RCA: Mild diffuse disease, collaterals going to the apical LAD    LV EDP 16   Recommendations:     Medical management, consider  intervention in 6 weeks if  symptomatic  Acute complication:    No complications, No complications     < end of copied text >    < from: CT Brain Perfusion Maps Stroke (24 @ 17:00) >    IMPRESSION:  HEAD CT: No evidence of an acute intracranial hemorhage, midline shift or   hydrocephalus.  NECK CTA: No hemodynamic significant narowing within the neck.  BRAIN CTA: No proximal large vessel occlusion.  CT PERFUSION: No areas of ischemia identified.    Preliminary head CT findings discussed with PA Andrew at 4:54 PM on  2024    < end of copied text >      Allergies    No Known Allergies    Intolerances        MEDICATIONS  (STANDING):  amLODIPine   Tablet 10 milliGRAM(s) Oral daily  aspirin  chewable 81 milliGRAM(s) Oral daily  atorvastatin 40 milliGRAM(s) Oral at bedtime  clopidogrel Tablet 75 milliGRAM(s) Oral daily  dapagliflozin 5 milliGRAM(s) Oral daily  heparin   Injectable 5000 Unit(s) SubCutaneous every 12 hours  hydrochlorothiazide 25 milliGRAM(s) Oral daily  isosorbide   mononitrate ER Tablet (IMDUR) 30 milliGRAM(s) Oral daily  sodium chloride 0.9%. 1000 milliLiter(s) (100 mL/Hr) IV Continuous <Continuous>  valsartan 160 milliGRAM(s) Oral daily    MEDICATIONS  (PRN):  acetaminophen     Tablet .. 650 milliGRAM(s) Oral every 6 hours PRN Temp greater or equal to 38C (100.4F), Mild Pain (1 - 3)  aluminum hydroxide/magnesium hydroxide/simethicone Suspension 30 milliLiter(s) Oral every 4 hours PRN Dyspepsia  meclizine 25 milliGRAM(s) Oral three times a day PRN for dizziness  melatonin 3 milliGRAM(s) Oral at bedtime PRN Insomnia  OLANZapine Injectable 5 milliGRAM(s) IntraMuscular every 6 hours PRN Agitation  ondansetron Injectable 4 milliGRAM(s) IV Push every 8 hours PRN Nausea and/or Vomiting      Vital Signs Last 24 Hrs  T(C): 36.7 (2024 08:00), Max: 37.1 (2024 16:30)  T(F): 98 (2024 08:00), Max: 98.7 (2024 16:30)  HR: 62 (:) (60 - 103)  BP: 106/59 (:) (98/54 - 158/92)  BP(mean): 74 (:) (66 - 109)  RR: 18 () (15 - 18)  SpO2: 100% () (96% - 100%)    Parameters below as of   Patient On (Oxygen Delivery Method): room air        Daily     Daily Weight in k.6 (2024 04:00)    I&O's Detail    2024 07:01  -  2024 07:00  --------------------------------------------------------  IN:    Oral Fluid: 525 mL  Total IN: 525 mL    OUT:  Total OUT: 0 mL    Total NET: 525 mL          PHYSICAL EXAM:   GENERAL: Pt lying comfortably, NAD.  ENMT: PERRL, +EOMI.  NECK: soft, Supple, No JVD,   CHEST/LUNG: Clear to auscultatation bilaterally; No wheezing.  HEART: S1S2+, Regular rate and rhythm; No murmurs.  ABDOMEN: Soft, Nontender, Nondistended; Bowel sounds present.  MUSCULOSKELETAL: Normal range of motion.  SKIN: No rashes or lesions.  NEURO: AAOX3, no focal deficits, no motor r sensory loss.  PSYCH: normal mood.  VASCULAR:   Radial +2 R/+2 L  Femoral +2 R/+2 L  PT +2 R/+2 L  DP +2 R/+2 L  CATH SITE: RFA, Right groin benign, RLE warm, perfusing, no bleeding or hematoma distal pulses 2+     INTERPRETATION OF TELEMETRY:    ECG: SR, ocacsional PVC's, no acute ST/T changes     LABS:                        10.7   3.01  )-----------( 156      ( 2024 06:55 )             33.9         140  |  102  |  13.0  ----------------------------<  87  3.8   |  26.0  |  0.94    Ca    9.1      2024 06:55    TPro  7.3  /  Alb  3.6  /  TBili  0.4  /  DBili  x   /  AST  23  /  ALT  11  /  AlkPhos  65          PT/INR - ( 2024 19:20 )   PT: 11.6 sec;   INR: 1.00 ratio         PTT - ( 2024 19:20 )  PTT:35.4 sec  Urinalysis Basic - ( 2024 06:55 )    Color: x / Appearance: x / SG: x / pH: x  Gluc: 87 mg/dL / Ketone: x  / Bili: x / Urobili: x   Blood: x / Protein: x / Nitrite: x   Leuk Esterase: x / RBC: x / WBC x   Sq Epi: x / Non Sq Epi: x / Bacteria: x      I&O's Summary    2024 07:01  -  2024 07:00  --------------------------------------------------------  IN: 525 mL / OUT: 0 mL / NET: 525 mL      BNP: 707 (24)

## 2024-12-01 LAB
ALBUMIN SERPL ELPH-MCNC: 3.7 G/DL — SIGNIFICANT CHANGE UP (ref 3.3–5.2)
ALP SERPL-CCNC: 54 U/L — SIGNIFICANT CHANGE UP (ref 40–120)
ALT FLD-CCNC: 10 U/L — SIGNIFICANT CHANGE UP
ANION GAP SERPL CALC-SCNC: 11 MMOL/L — SIGNIFICANT CHANGE UP (ref 5–17)
AST SERPL-CCNC: 21 U/L — SIGNIFICANT CHANGE UP
BILIRUB SERPL-MCNC: 0.3 MG/DL — LOW (ref 0.4–2)
BUN SERPL-MCNC: 14.1 MG/DL — SIGNIFICANT CHANGE UP (ref 8–20)
CALCIUM SERPL-MCNC: 9.6 MG/DL — SIGNIFICANT CHANGE UP (ref 8.4–10.5)
CHLORIDE SERPL-SCNC: 106 MMOL/L — SIGNIFICANT CHANGE UP (ref 96–108)
CO2 SERPL-SCNC: 27 MMOL/L — SIGNIFICANT CHANGE UP (ref 22–29)
CREAT SERPL-MCNC: 1.16 MG/DL — SIGNIFICANT CHANGE UP (ref 0.5–1.3)
EGFR: 48 ML/MIN/1.73M2 — LOW
GLUCOSE SERPL-MCNC: 91 MG/DL — SIGNIFICANT CHANGE UP (ref 70–99)
HCT VFR BLD CALC: 32.3 % — LOW (ref 34.5–45)
HGB BLD-MCNC: 10.2 G/DL — LOW (ref 11.5–15.5)
MCHC RBC-ENTMCNC: 26.8 PG — LOW (ref 27–34)
MCHC RBC-ENTMCNC: 31.6 G/DL — LOW (ref 32–36)
MCV RBC AUTO: 85 FL — SIGNIFICANT CHANGE UP (ref 80–100)
PLATELET # BLD AUTO: 164 K/UL — SIGNIFICANT CHANGE UP (ref 150–400)
POTASSIUM SERPL-MCNC: 3.5 MMOL/L — SIGNIFICANT CHANGE UP (ref 3.5–5.3)
POTASSIUM SERPL-SCNC: 3.5 MMOL/L — SIGNIFICANT CHANGE UP (ref 3.5–5.3)
PROT SERPL-MCNC: 6.6 G/DL — SIGNIFICANT CHANGE UP (ref 6.6–8.7)
RBC # BLD: 3.8 M/UL — SIGNIFICANT CHANGE UP (ref 3.8–5.2)
RBC # FLD: 17.7 % — HIGH (ref 10.3–14.5)
SODIUM SERPL-SCNC: 144 MMOL/L — SIGNIFICANT CHANGE UP (ref 135–145)
WBC # BLD: 3.07 K/UL — LOW (ref 3.8–10.5)
WBC # FLD AUTO: 3.07 K/UL — LOW (ref 3.8–10.5)

## 2024-12-01 PROCEDURE — 99233 SBSQ HOSP IP/OBS HIGH 50: CPT

## 2024-12-01 RX ORDER — POTASSIUM CHLORIDE 600 MG/1
40 TABLET, EXTENDED RELEASE ORAL ONCE
Refills: 0 | Status: COMPLETED | OUTPATIENT
Start: 2024-12-01 | End: 2024-12-01

## 2024-12-01 RX ORDER — ISOSORBIDE MONONITRATE 10 MG
30 TABLET ORAL DAILY
Refills: 0 | Status: DISCONTINUED | OUTPATIENT
Start: 2024-12-01 | End: 2024-12-03

## 2024-12-01 RX ORDER — AMLODIPINE BESYLATE 10 MG/1
5 TABLET ORAL DAILY
Refills: 0 | Status: DISCONTINUED | OUTPATIENT
Start: 2024-12-01 | End: 2024-12-03

## 2024-12-01 RX ADMIN — METOPROLOL TARTRATE 25 MILLIGRAM(S): 100 TABLET, FILM COATED ORAL at 21:10

## 2024-12-01 RX ADMIN — POTASSIUM CHLORIDE 40 MILLIEQUIVALENT(S): 600 TABLET, EXTENDED RELEASE ORAL at 16:52

## 2024-12-01 RX ADMIN — Medication 81 MILLIGRAM(S): at 10:15

## 2024-12-01 RX ADMIN — CLOPIDOGREL 75 MILLIGRAM(S): 75 TABLET, FILM COATED ORAL at 10:16

## 2024-12-01 RX ADMIN — Medication 5000 UNIT(S): at 05:04

## 2024-12-01 RX ADMIN — DAPAGLIFLOZIN 5 MILLIGRAM(S): 10 TABLET, FILM COATED ORAL at 10:16

## 2024-12-01 RX ADMIN — Medication 5000 UNIT(S): at 16:51

## 2024-12-01 RX ADMIN — Medication 40 MILLIGRAM(S): at 21:10

## 2024-12-01 NOTE — PROGRESS NOTE ADULT - ASSESSMENT
Patient is a 78 yo F w/ PMH of HTN, CAD s/p SHIRA (10/23), HLD, Sarcoidosis presenting with chief complaint of chest pain and shortness of breath. 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 S/P IV Lasix, TTE EF 45-50%. S/P [facility]  11/29 no acute change.continue GDMT,ASA,Plavix. Code stroke called 11/29/24 for symptoms of altered mental status and expressive aphasia.CTH stable.        Hypotension  -asymptomatics   -continue metoprolol holding parameter  -cut down amlodipine 5 mg qd holding parameter  -cut down hctz 12.5 mg qd  -imdur holding parameter  -monitor bp claosely        Altered mental status and expressive aphasia -resolved now  -S/P CODE stroke called 11/29/24  -cth stable  -continue asa,plavix,statin  -tte stable  -neurocheck  -mri brain        Acute on chronic HFmrEF exacerbation - now euvolemic on exam   Chest pain LIKELY SEC TO ABOVE, Hx of CAD s/p SHIRA (10/23),  - Telemetry  - S/P IV LASIX  - GDMT  - NST positive  - S/P [facility] 11/29. STABLE  - Cardiology following  - TTE w/ LVEF of 45-50%, basal and mid anterior septum abnormal  - Trop 25-24  - ProBNP 707  - Monitor I&Os, daily weights, fluid/salt restriction  - SGLT2i on discharge  - s/p LHC -- no intervention, DAPT x 3 months  -F/U CARDIO REC  -monitor bp closely      HLD  - Continue Statin    DVT ppx: Heparin SQ  Dispo: pending MRI brain  Plan of care lily pt. She does not want to involve her childrens in her care at this moment. She does not want me to call family.   lily RN

## 2024-12-01 NOTE — PROGRESS NOTE ADULT - SUBJECTIVE AND OBJECTIVE BOX
Patient is a 79y old  Female who presents with a chief complaint of Chest pain (30 Nov 2024 10:25)      pt denies cp,sob,dizziness.  bp was low overnight. hold bp meds  bp soft now.no symptoms  REVIEW OF SYSTEMS: All systems are reviewed and found to be negative except above    MEDICATIONS  (STANDING):  amLODIPine   Tablet 5 milliGRAM(s) Oral daily  aspirin  chewable 81 milliGRAM(s) Oral daily  atorvastatin 40 milliGRAM(s) Oral at bedtime  clopidogrel Tablet 75 milliGRAM(s) Oral daily  dapagliflozin 5 milliGRAM(s) Oral daily  heparin   Injectable 5000 Unit(s) SubCutaneous every 12 hours  hydrochlorothiazide 12.5 milliGRAM(s) Oral daily  isosorbide   mononitrate ER Tablet (IMDUR) 30 milliGRAM(s) Oral daily  metoprolol succinate ER 25 milliGRAM(s) Oral at bedtime  sodium chloride 0.9%. 1000 milliLiter(s) (100 mL/Hr) IV Continuous <Continuous>    MEDICATIONS  (PRN):  acetaminophen     Tablet .. 650 milliGRAM(s) Oral every 6 hours PRN Temp greater or equal to 38C (100.4F), Mild Pain (1 - 3)  aluminum hydroxide/magnesium hydroxide/simethicone Suspension 30 milliLiter(s) Oral every 4 hours PRN Dyspepsia  meclizine 25 milliGRAM(s) Oral three times a day PRN for dizziness  melatonin 3 milliGRAM(s) Oral at bedtime PRN Insomnia  OLANZapine Injectable 5 milliGRAM(s) IntraMuscular every 6 hours PRN Agitation  ondansetron Injectable 4 milliGRAM(s) IV Push every 8 hours PRN Nausea and/or Vomiting      CAPILLARY BLOOD GLUCOSE        I&O's Summary    30 Nov 2024 07:01  -  01 Dec 2024 07:00  --------------------------------------------------------  IN: 925 mL / OUT: 0 mL / NET: 925 mL    01 Dec 2024 07:01  -  01 Dec 2024 11:51  --------------------------------------------------------  IN: 475 mL / OUT: 0 mL / NET: 475 mL        PHYSICAL EXAM:  Vital Signs Last 24 Hrs  T(C): 36.7 (01 Dec 2024 07:40), Max: 36.8 (30 Nov 2024 19:38)  T(F): 98 (01 Dec 2024 07:40), Max: 98.2 (30 Nov 2024 19:38)  HR: 74 (01 Dec 2024 10:00) (58 - 91)  BP: 108/66 (01 Dec 2024 10:00) (74/56 - 146/76)  BP(mean): 81 (01 Dec 2024 10:00) (56 - 95)  RR: 17 (01 Dec 2024 10:00) (16 - 18)  SpO2: 95% (01 Dec 2024 10:00) (94% - 100%)    Parameters below as of 01 Dec 2024 10:00  Patient On (Oxygen Delivery Method): room air        CONSTITUTIONAL: NAD,  EYES: PERRLA; conjunctiva and sclera clear  ENMT: Moist oral mucosa,   RESPIRATORY: Normal respiratory effort; lungs are clear to auscultation bilaterally  CARDIOVASCULAR: Regular rate and rhythm, normal S1 and S2, no murmur   EXTS: No lower extremity edema; Peripheral pulses are 2+ bilaterally  ABDOMEN: Nontender to palpation, normoactive bowel sounds, no rebound/guarding;   MUSCLOSKELETAL:  no joint swelling or tenderness to palpation  PSYCH: affect appropriate  NEUROLOGY: A+O to person, place, and time; CN 2-12 are intact and symmetric; no gross sensory deficits;       LABS:                        10.2   3.07  )-----------( 164      ( 01 Dec 2024 04:40 )             32.3     12-01    144  |  106  |  14.1  ----------------------------<  91  3.5   |  27.0  |  1.16    Ca    9.6      01 Dec 2024 04:40    TPro  6.6  /  Alb  3.7  /  TBili  0.3[L]  /  DBili  x   /  AST  21  /  ALT  10  /  AlkPhos  54  12-01    PT/INR - ( 29 Nov 2024 19:20 )   PT: 11.6 sec;   INR: 1.00 ratio         PTT - ( 29 Nov 2024 19:20 )  PTT:35.4 sec      Urinalysis Basic - ( 01 Dec 2024 04:40 )    Color: x / Appearance: x / SG: x / pH: x  Gluc: 91 mg/dL / Ketone: x  / Bili: x / Urobili: x   Blood: x / Protein: x / Nitrite: x   Leuk Esterase: x / RBC: x / WBC x   Sq Epi: x / Non Sq Epi: x / Bacteria: x          RADIOLOGY & ADDITIONAL TESTS:  Results Reviewed:

## 2024-12-02 LAB
ANION GAP SERPL CALC-SCNC: 11 MMOL/L — SIGNIFICANT CHANGE UP (ref 5–17)
BUN SERPL-MCNC: 17 MG/DL — SIGNIFICANT CHANGE UP (ref 8–20)
CALCIUM SERPL-MCNC: 9.2 MG/DL — SIGNIFICANT CHANGE UP (ref 8.4–10.5)
CHLORIDE SERPL-SCNC: 104 MMOL/L — SIGNIFICANT CHANGE UP (ref 96–108)
CO2 SERPL-SCNC: 24 MMOL/L — SIGNIFICANT CHANGE UP (ref 22–29)
CREAT SERPL-MCNC: 1 MG/DL — SIGNIFICANT CHANGE UP (ref 0.5–1.3)
EGFR: 58 ML/MIN/1.73M2 — LOW
GLUCOSE SERPL-MCNC: 87 MG/DL — SIGNIFICANT CHANGE UP (ref 70–99)
HCT VFR BLD CALC: 32.9 % — LOW (ref 34.5–45)
HGB BLD-MCNC: 10.4 G/DL — LOW (ref 11.5–15.5)
MCHC RBC-ENTMCNC: 26.7 PG — LOW (ref 27–34)
MCHC RBC-ENTMCNC: 31.6 G/DL — LOW (ref 32–36)
MCV RBC AUTO: 84.4 FL — SIGNIFICANT CHANGE UP (ref 80–100)
PLATELET # BLD AUTO: 159 K/UL — SIGNIFICANT CHANGE UP (ref 150–400)
POTASSIUM SERPL-MCNC: 3.9 MMOL/L — SIGNIFICANT CHANGE UP (ref 3.5–5.3)
POTASSIUM SERPL-SCNC: 3.9 MMOL/L — SIGNIFICANT CHANGE UP (ref 3.5–5.3)
RBC # BLD: 3.9 M/UL — SIGNIFICANT CHANGE UP (ref 3.8–5.2)
RBC # FLD: 17.4 % — HIGH (ref 10.3–14.5)
SODIUM SERPL-SCNC: 139 MMOL/L — SIGNIFICANT CHANGE UP (ref 135–145)
WBC # BLD: 2.55 K/UL — LOW (ref 3.8–10.5)
WBC # FLD AUTO: 2.55 K/UL — LOW (ref 3.8–10.5)

## 2024-12-02 PROCEDURE — 99233 SBSQ HOSP IP/OBS HIGH 50: CPT

## 2024-12-02 RX ORDER — METOPROLOL TARTRATE 100 MG/1
12.5 TABLET, FILM COATED ORAL DAILY
Refills: 0 | Status: DISCONTINUED | OUTPATIENT
Start: 2024-12-03 | End: 2024-12-03

## 2024-12-02 RX ORDER — METOPROLOL TARTRATE 100 MG/1
1 TABLET, FILM COATED ORAL
Qty: 0 | Refills: 0 | DISCHARGE
Start: 2024-12-02

## 2024-12-02 RX ORDER — SODIUM CHLORIDE 9 MG/ML
500 INJECTION, SOLUTION INTRAMUSCULAR; INTRAVENOUS; SUBCUTANEOUS ONCE
Refills: 0 | Status: COMPLETED | OUTPATIENT
Start: 2024-12-02 | End: 2024-12-02

## 2024-12-02 RX ORDER — DAPAGLIFLOZIN 10 MG/1
1 TABLET, FILM COATED ORAL
Qty: 0 | Refills: 0 | DISCHARGE
Start: 2024-12-02

## 2024-12-02 RX ADMIN — SODIUM CHLORIDE 500 MILLILITER(S): 9 INJECTION, SOLUTION INTRAMUSCULAR; INTRAVENOUS; SUBCUTANEOUS at 12:25

## 2024-12-02 RX ADMIN — Medication 81 MILLIGRAM(S): at 12:25

## 2024-12-02 RX ADMIN — Medication 40 MILLIGRAM(S): at 21:25

## 2024-12-02 RX ADMIN — CLOPIDOGREL 75 MILLIGRAM(S): 75 TABLET, FILM COATED ORAL at 12:25

## 2024-12-02 RX ADMIN — Medication 5000 UNIT(S): at 17:07

## 2024-12-02 RX ADMIN — Medication 5000 UNIT(S): at 05:25

## 2024-12-02 RX ADMIN — AMLODIPINE BESYLATE 5 MILLIGRAM(S): 10 TABLET ORAL at 05:25

## 2024-12-02 NOTE — PROGRESS NOTE ADULT - SUBJECTIVE AND OBJECTIVE BOX
Patient is a 79y old  Female who presents with a chief complaint of Chest pain (02 Dec 2024 08:47)      No acute issues  REVIEW OF SYSTEMS: All systems are reviewed and found to be negative except above    MEDICATIONS  (STANDING):  amLODIPine   Tablet 5 milliGRAM(s) Oral daily  aspirin  chewable 81 milliGRAM(s) Oral daily  atorvastatin 40 milliGRAM(s) Oral at bedtime  clopidogrel Tablet 75 milliGRAM(s) Oral daily  dapagliflozin 5 milliGRAM(s) Oral daily  heparin   Injectable 5000 Unit(s) SubCutaneous every 12 hours  hydrochlorothiazide 12.5 milliGRAM(s) Oral daily  isosorbide   mononitrate ER Tablet (IMDUR) 30 milliGRAM(s) Oral daily  metoprolol succinate ER 25 milliGRAM(s) Oral at bedtime  sodium chloride 0.9%. 1000 milliLiter(s) (100 mL/Hr) IV Continuous <Continuous>    MEDICATIONS  (PRN):  acetaminophen     Tablet .. 650 milliGRAM(s) Oral every 6 hours PRN Temp greater or equal to 38C (100.4F), Mild Pain (1 - 3)  aluminum hydroxide/magnesium hydroxide/simethicone Suspension 30 milliLiter(s) Oral every 4 hours PRN Dyspepsia  bisacodyl Suppository 10 milliGRAM(s) Rectal daily PRN Constipation  magnesium hydroxide Suspension 30 milliLiter(s) Oral daily PRN Constipation  meclizine 25 milliGRAM(s) Oral three times a day PRN for dizziness  melatonin 3 milliGRAM(s) Oral at bedtime PRN Insomnia  OLANZapine Injectable 5 milliGRAM(s) IntraMuscular every 6 hours PRN Agitation  ondansetron Injectable 4 milliGRAM(s) IV Push every 8 hours PRN Nausea and/or Vomiting      CAPILLARY BLOOD GLUCOSE        I&O's Summary    01 Dec 2024 07:01  -  02 Dec 2024 07:00  --------------------------------------------------------  IN: 825 mL / OUT: 0 mL / NET: 825 mL        PHYSICAL EXAM:  Vital Signs Last 24 Hrs  T(C): 36.4 (02 Dec 2024 07:54), Max: 36.7 (02 Dec 2024 00:07)  T(F): 97.6 (02 Dec 2024 07:54), Max: 98.1 (02 Dec 2024 04:05)  HR: 73 (02 Dec 2024 10:00) (57 - 88)  BP: 149/59 (02 Dec 2024 10:00) (104/56 - 149/59)  BP(mean): 84 (02 Dec 2024 10:00) (71 - 85)  RR: 17 (02 Dec 2024 10:00) (16 - 18)  SpO2: 100% (02 Dec 2024 10:00) (100% - 100%)    Parameters below as of 02 Dec 2024 10:00  Patient On (Oxygen Delivery Method): room air        CONSTITUTIONAL: NAD,  EYES: PERRLA; conjunctiva and sclera clear  ENMT: Moist oral mucosa,   RESPIRATORY: Normal respiratory effort; lungs are clear to auscultation bilaterally  CARDIOVASCULAR: Regular rate and rhythm, normal S1 and S2, no murmur   EXTS: No lower extremity edema; Peripheral pulses are 2+ bilaterally  ABDOMEN: Nontender to palpation, normoactive bowel sounds, no rebound/guarding;   MUSCLOSKELETAL:; no joint swelling or tenderness to palpation  PSYCH: affect appropriate  NEUROLOGY: A+O to person, place, and time;  no gross sensory deficits;       LABS:                        10.4   2.55  )-----------( 159      ( 02 Dec 2024 05:50 )             32.9     12-02    139  |  104  |  17.0  ----------------------------<  87  3.9   |  24.0  |  1.00    Ca    9.2      02 Dec 2024 05:50    TPro  6.6  /  Alb  3.7  /  TBili  0.3[L]  /  DBili  x   /  AST  21  /  ALT  10  /  AlkPhos  54  12-01          Urinalysis Basic - ( 02 Dec 2024 05:50 )    Color: x / Appearance: x / SG: x / pH: x  Gluc: 87 mg/dL / Ketone: x  / Bili: x / Urobili: x   Blood: x / Protein: x / Nitrite: x   Leuk Esterase: x / RBC: x / WBC x   Sq Epi: x / Non Sq Epi: x / Bacteria: x          RADIOLOGY & ADDITIONAL TESTS:  Results Reviewed:

## 2024-12-02 NOTE — PHYSICAL THERAPY INITIAL EVALUATION ADULT - PERTINENT HX OF CURRENT PROBLEM, REHAB EVAL
Patient is a 80 yo F w/ PMH of HTN, CAD s/p SHIRA (10/23), HLD, Sarcoidosis presenting with chief complaint of chest pain and shortness of breath. 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 S/P IV Lasix, TTE EF 45-50%. S/P LHC  11/29 no acute change.continue GDMT,ASA,Plavix. Code stroke called 11/29/24 for symptoms of altered mental status and expressive aphasia. CTH stable.

## 2024-12-02 NOTE — PHYSICAL THERAPY INITIAL EVALUATION ADULT - ADDITIONAL COMMENTS
Pt reports she lives alone in an apartment with no stairs. Pt was independent in the house without a device and modified independent outside of the home with SAC vs RW. Pt also owns a shower chair and grab bars.

## 2024-12-02 NOTE — PROGRESS NOTE ADULT - ASSESSMENT
Patient is a 78 yo F w/ PMH of HTN, CAD s/p SHIRA (10/23), HLD, Sarcoidosis presenting with chief complaint of chest pain and shortness of breath. 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 S/P IV Lasix, TTE EF 45-50%. S/P [facility]  11/29 no acute change.continue GDMT,ASA,Plavix. Code stroke called 11/29/24 for symptoms of altered mental status and expressive aphasia.CTH stable.        Hypotension  -improved   -continue metoprolol holding parameter  -cut down amlodipine 5 mg qd holding parameter  -cut down hctz 12.5 mg qd  -imdur holding parameter  -monitor bp claosely        Altered mental status and expressive aphasia -resolved now  -S/P CODE stroke called 11/29/24  -cth stable  -continue asa,plavix,statin  -tte stable  -neurocheck  -mri brain pending        Acute on chronic HFmrEF exacerbation - now euvolemic on exam   Chest pain LIKELY SEC TO ABOVE  Hx of CAD s/p SHIRA (10/23),  - Telemetry  - S/P IV LASIX  - GDMT  - NST positive  - S/P [facility] 11/29. STABLE  - Cardiology following  - TTE w/ LVEF of 45-50%, basal and mid anterior septum abnormal  - Trop 25-24  - ProBNP 707  - Monitor I&Os, daily weights, fluid/salt restriction  - SGLT2i on discharge  - s/p LHC -- no intervention, DAPT x 3 months  -F/U CARDIO REC  -monitor bp closely      HLD  - Continue Statin  PT eval    DVT ppx: Heparin SQ  Dispo: pending MRI brain   Patient is a 80 yo F w/ PMH of HTN, CAD s/p SHIRA (10/23), HLD, Sarcoidosis presenting with chief complaint of chest pain and shortness of breath. 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 S/P IV Lasix, TTE EF 45-50%. S/P LHC  11/29 no acute change.continue GDMT,ASA,Plavix. Code stroke called 11/29/24 for symptoms of altered mental status and expressive aphasia.CTH stable.symptoms resolved. pt had episode of hypotension. adjust bp meds with improvement. PT rec home. pending MRI Brain. F/U cardiology out         Hypotension  -improved   -continue metoprolol holding parameter  -cut down amlodipine 5 mg qd holding parameter  -cut down hctz 12.5 mg qd  -imdur holding parameter  -monitor bp claosely        Altered mental status and expressive aphasia -resolved now  -S/P CODE stroke called 11/29/24  -cth stable  -continue asa,plavix,statin  -tte stable  -mri brain pending        Acute on chronic HFmrEF exacerbation - now euvolemic on exam   Chest pain LIKELY SEC TO ABOVE  Hx of CAD s/p SHIRA (10/23),  - Telemetry  - S/P IV LASIX  - GDMT  - NST positive  - S/P [facility] 11/29. STABLE  - Cardiology following  - TTE w/ LVEF of 45-50%, basal and mid anterior septum abnormal  - Trop 25-24  - ProBNP 707  - Monitor I&Os, daily weights, fluid/salt restriction  - SGLT2i on discharge  - s/p LHC -- no intervention, DAPT x 3 months  -F/U CARDIO REC  -monitor bp closely      HLD  - Continue Statin  PT eval home    DVT ppx: Heparin SQ  Dispo: pending MRI brain

## 2024-12-03 ENCOUNTER — TRANSCRIPTION ENCOUNTER (OUTPATIENT)
Age: 79
End: 2024-12-03

## 2024-12-03 VITALS
DIASTOLIC BLOOD PRESSURE: 80 MMHG | TEMPERATURE: 98 F | OXYGEN SATURATION: 98 % | HEART RATE: 76 BPM | SYSTOLIC BLOOD PRESSURE: 160 MMHG | RESPIRATION RATE: 18 BRPM

## 2024-12-03 LAB
ANION GAP SERPL CALC-SCNC: 9 MMOL/L — SIGNIFICANT CHANGE UP (ref 5–17)
BUN SERPL-MCNC: 17.4 MG/DL — SIGNIFICANT CHANGE UP (ref 8–20)
CALCIUM SERPL-MCNC: 8.6 MG/DL — SIGNIFICANT CHANGE UP (ref 8.4–10.5)
CHLORIDE SERPL-SCNC: 106 MMOL/L — SIGNIFICANT CHANGE UP (ref 96–108)
CO2 SERPL-SCNC: 25 MMOL/L — SIGNIFICANT CHANGE UP (ref 22–29)
CREAT SERPL-MCNC: 0.99 MG/DL — SIGNIFICANT CHANGE UP (ref 0.5–1.3)
EGFR: 58 ML/MIN/1.73M2 — LOW
GLUCOSE SERPL-MCNC: 85 MG/DL — SIGNIFICANT CHANGE UP (ref 70–99)
POTASSIUM SERPL-MCNC: 4 MMOL/L — SIGNIFICANT CHANGE UP (ref 3.5–5.3)
POTASSIUM SERPL-SCNC: 4 MMOL/L — SIGNIFICANT CHANGE UP (ref 3.5–5.3)
SODIUM SERPL-SCNC: 140 MMOL/L — SIGNIFICANT CHANGE UP (ref 135–145)

## 2024-12-03 PROCEDURE — 83036 HEMOGLOBIN GLYCOSYLATED A1C: CPT

## 2024-12-03 PROCEDURE — 86901 BLOOD TYPING SEROLOGIC RH(D): CPT

## 2024-12-03 PROCEDURE — 84484 ASSAY OF TROPONIN QUANT: CPT

## 2024-12-03 PROCEDURE — 80061 LIPID PANEL: CPT

## 2024-12-03 PROCEDURE — 70498 CT ANGIOGRAPHY NECK: CPT | Mod: MC

## 2024-12-03 PROCEDURE — 70551 MRI BRAIN STEM W/O DYE: CPT | Mod: MC

## 2024-12-03 PROCEDURE — 99233 SBSQ HOSP IP/OBS HIGH 50: CPT

## 2024-12-03 PROCEDURE — 83880 ASSAY OF NATRIURETIC PEPTIDE: CPT

## 2024-12-03 PROCEDURE — G0378: CPT

## 2024-12-03 PROCEDURE — 85025 COMPLETE CBC W/AUTO DIFF WBC: CPT

## 2024-12-03 PROCEDURE — 36415 COLL VENOUS BLD VENIPUNCTURE: CPT

## 2024-12-03 PROCEDURE — 70551 MRI BRAIN STEM W/O DYE: CPT | Mod: 26

## 2024-12-03 PROCEDURE — 84466 ASSAY OF TRANSFERRIN: CPT

## 2024-12-03 PROCEDURE — 85730 THROMBOPLASTIN TIME PARTIAL: CPT

## 2024-12-03 PROCEDURE — C1887: CPT

## 2024-12-03 PROCEDURE — C1753: CPT

## 2024-12-03 PROCEDURE — P9047: CPT

## 2024-12-03 PROCEDURE — 82550 ASSAY OF CK (CPK): CPT

## 2024-12-03 PROCEDURE — 92978 ENDOLUMINL IVUS OCT C 1ST: CPT | Mod: LD

## 2024-12-03 PROCEDURE — 80048 BASIC METABOLIC PNL TOTAL CA: CPT

## 2024-12-03 PROCEDURE — C1725: CPT

## 2024-12-03 PROCEDURE — 93017 CV STRESS TEST TRACING ONLY: CPT

## 2024-12-03 PROCEDURE — 96374 THER/PROPH/DIAG INJ IV PUSH: CPT

## 2024-12-03 PROCEDURE — 86850 RBC ANTIBODY SCREEN: CPT

## 2024-12-03 PROCEDURE — 0042T: CPT | Mod: MC

## 2024-12-03 PROCEDURE — C1769: CPT

## 2024-12-03 PROCEDURE — 70450 CT HEAD/BRAIN W/O DYE: CPT | Mod: MC

## 2024-12-03 PROCEDURE — 70496 CT ANGIOGRAPHY HEAD: CPT | Mod: MC

## 2024-12-03 PROCEDURE — 83550 IRON BINDING TEST: CPT

## 2024-12-03 PROCEDURE — 93458 L HRT ARTERY/VENTRICLE ANGIO: CPT | Mod: 59

## 2024-12-03 PROCEDURE — 99285 EMERGENCY DEPT VISIT HI MDM: CPT | Mod: 25

## 2024-12-03 PROCEDURE — 85610 PROTHROMBIN TIME: CPT

## 2024-12-03 PROCEDURE — 92920 PRQ TRLUML C ANGIOP 1ART&/BR: CPT | Mod: LD

## 2024-12-03 PROCEDURE — 82962 GLUCOSE BLOOD TEST: CPT

## 2024-12-03 PROCEDURE — 83540 ASSAY OF IRON: CPT

## 2024-12-03 PROCEDURE — 86900 BLOOD TYPING SEROLOGIC ABO: CPT

## 2024-12-03 PROCEDURE — 78452 HT MUSCLE IMAGE SPECT MULT: CPT | Mod: MC

## 2024-12-03 PROCEDURE — C1760: CPT

## 2024-12-03 PROCEDURE — 93306 TTE W/DOPPLER COMPLETE: CPT

## 2024-12-03 PROCEDURE — A9500: CPT

## 2024-12-03 PROCEDURE — 71045 X-RAY EXAM CHEST 1 VIEW: CPT

## 2024-12-03 PROCEDURE — 80053 COMPREHEN METABOLIC PANEL: CPT

## 2024-12-03 PROCEDURE — 93005 ELECTROCARDIOGRAM TRACING: CPT

## 2024-12-03 PROCEDURE — 93970 EXTREMITY STUDY: CPT

## 2024-12-03 PROCEDURE — 85027 COMPLETE CBC AUTOMATED: CPT

## 2024-12-03 PROCEDURE — C1894: CPT

## 2024-12-03 PROCEDURE — 99239 HOSP IP/OBS DSCHRG MGMT >30: CPT

## 2024-12-03 RX ORDER — ISOSORBIDE MONONITRATE 10 MG
1 TABLET ORAL
Refills: 0 | DISCHARGE

## 2024-12-03 RX ORDER — AMLODIPINE BESYLATE 10 MG/1
1 TABLET ORAL
Qty: 30 | Refills: 0
Start: 2024-12-03 | End: 2025-01-01

## 2024-12-03 RX ORDER — METOPROLOL TARTRATE 100 MG/1
0.5 TABLET, FILM COATED ORAL
Qty: 15 | Refills: 0
Start: 2024-12-03 | End: 2025-01-01

## 2024-12-03 RX ORDER — DAPAGLIFLOZIN 10 MG/1
1 TABLET, FILM COATED ORAL
Qty: 30 | Refills: 0
Start: 2024-12-03 | End: 2025-01-01

## 2024-12-03 RX ADMIN — CLOPIDOGREL 75 MILLIGRAM(S): 75 TABLET, FILM COATED ORAL at 11:54

## 2024-12-03 RX ADMIN — METOPROLOL TARTRATE 12.5 MILLIGRAM(S): 100 TABLET, FILM COATED ORAL at 05:53

## 2024-12-03 RX ADMIN — Medication 5000 UNIT(S): at 05:55

## 2024-12-03 RX ADMIN — AMLODIPINE BESYLATE 5 MILLIGRAM(S): 10 TABLET ORAL at 05:54

## 2024-12-03 RX ADMIN — Medication 81 MILLIGRAM(S): at 11:54

## 2024-12-03 RX ADMIN — DAPAGLIFLOZIN 5 MILLIGRAM(S): 10 TABLET, FILM COATED ORAL at 11:54

## 2024-12-03 NOTE — PROGRESS NOTE ADULT - ASSESSMENT
79y old  Female PMH HTN, HLD, CAD with LAD stent in 2021; SHIRA of ostial LM 10/2023, Sarcoidosis by Vats per pt hx. Presents with one year on chest pain and dyspnea on exertion.  Post SHIRA 10/2023 she still had symptoms of SOB up till now. Today it was raining out and she was rushing to get into a building and she became so dyspneic employees called EMS. In regards to the chest pain it is epigastric and occasional left upper chest wall pain, only occurs with maximal exertion. She also endorses stop taking HCTZ as instructed by her ?cardiologist and ran out of statin. She is not the best historian. Echo revealed mild reduction in LVEF. NST was abnormal. Pt is pending [facility] on 11/29.     #Acute diastolic congestive heart failure  - pt appears euvolemic; off diuretics since July 2024   - trop x2 25->24; proBNP 707  - TTE EF 45-50%. Basal and mid anterior septum is abnormal. Grade I DD. Normal RV size and function. PASP 27 mmHg.   - TTE 10/2021 EF 50%. Mild to moderate MR. Mild MR.   - NST 11/27 is abnormal  - Continue with Metoprolol XL 12.5mg PO OD  - Continue Amlodipine 5mg oral daily  - Continue Dapagliflozin 5mg oral daily    - Needs OP Cardiology f/u with Dr. Cortes     #CAD (coronary artery disease) complicated by Hypotension s/p Cath  - CAD with LAD stent in 2021; SHIRA of ostial LM 10/2023  - No complaints of chest pain, chest pressure and anginal equivalent symptoms  - c/w Plavix 75 mg PO daily and atorvastatin 40 mg PO daily  - HOLD Imdur 30 mg PO daily for now, order D/C'd  - [facility] results above, no acute changes   79y old  Female PMH HTN, HLD, CAD with LAD stent in 2021; SHIRA of ostial LM 10/2023, Sarcoidosis by Vats per pt hx. Presents with one year on chest pain and dyspnea on exertion.  Post SHIRA 10/2023 she still had symptoms of SOB up till now. Today it was raining out and she was rushing to get into a building and she became so dyspneic employees called EMS. In regards to the chest pain it is epigastric and occasional left upper chest wall pain, only occurs with maximal exertion. She also endorses stop taking HCTZ as instructed by her ?cardiologist and ran out of statin. She is not the best historian. Echo revealed mild reduction in LVEF. NST was abnormal.   #Acute diastolic congestive heart failure  - pt appears euvolemic; off diuretics since July 2024   - trop x2 25->24; proBNP 707  - TTE EF 45-50%. Basal and mid anterior septum is abnormal. Grade I DD. Normal RV size and function. PASP 27 mmHg.   - TTE 10/2021 EF 50%. Mild to moderate MR. Mild MR.   - NST 11/27 is abnormal  - Continue with Metoprolol XL 12.5mg PO OD  - Continue Amlodipine 5mg oral daily  - Continue Dapagliflozin 5mg oral daily    - Needs OP Cardiology f/u with Dr. Cortes     #CAD (coronary artery disease) complicated by Hypotension s/p Cath  - CAD with LAD stent in 2021; SHIRA of ostial LM 10/2023  - No complaints of chest pain, chest pressure and anginal equivalent symptoms  - c/w Plavix 75 mg PO daily and atorvastatin 40 mg PO daily  - HOLD Imdur 30 mg PO daily for now, order D/C'd  - [facility] results above, no acute changes

## 2024-12-03 NOTE — PROGRESS NOTE ADULT - ASSESSMENT
Patient is a 80 yo F w/ PMH of HTN, CAD s/p SHIRA (10/23), HLD, Sarcoidosis presenting with chief complaint of chest pain and shortness of breath. 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 S/P IV Lasix, TTE EF 45-50%. S/P [facility]  11/29 no acute change.continue GDMT,ASA,Plavix. Code stroke called 11/29/24 for symptoms of altered mental status and expressive aphasia.CTH stable.symptoms resolved. pt had episode of hypotension. adjust bp meds with improvement. PT rec home. pending MRI Brain. F/U cardiology out         Hypotension  -improved (sbp was 88 s/p bolus yesterday)  -continue metoprolol holding parameter  -cut down amlodipine 5 mg qd holding parameter  -hold  hctz 12.5 mg qd  -imdur holding parameter  -monitor bp closely        Altered mental status and expressive aphasia -resolved now  -S/P CODE stroke called 11/29/24  -cth stable  -continue asa,plavix,statin  -tte stable  -mri brain pending        Acute on chronic HFmrEF exacerbation - now euvolemic on exam   Chest pain LIKELY SEC TO ABOVE  Hx of CAD s/p SHIRA (10/23),  - Telemetry  - S/P IV LASIX  - GDMT. hold hctz for hypotension  - NST positive  - S/P [facility] 11/29. STABLE  - Cardiology following  - TTE w/ LVEF of 45-50%, basal and mid anterior septum abnormal  - Trop 25-24  - ProBNP 707  - Monitor I&Os, daily weights, fluid/salt restriction  - SGLT2i on discharge  - s/p LH -- no intervention, DAPT x 3 months  -F/U CARDIO REC  -monitor bp closely      HLD  - Continue Statin  PT eval home    DVT ppx: Heparin SQ  Dispo: pending MRI brain. dc plan home  dw pt Patient is a 80 yo F w/ PMH of HTN, CAD s/p SHIRA (10/23), HLD, Sarcoidosis presenting with chief complaint of chest pain and shortness of breath. 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 S/P IV Lasix, TTE EF 45-50%. S/P [facility]  11/29 no acute change.continue GDMT,ASA,Plavix. Code stroke called 11/29/24 for symptoms of altered mental status and expressive aphasia.CTH stable.symptoms resolved. pt had episode of hypotension. adjust bp meds with improvement. PT rec home. pending MRI Brain. F/U cardiology out         Hypotension  -improved (sbp was 88 s/p bolus yesterday)  -continue metoprolol holding parameter  -cut down amlodipine 5 mg qd holding parameter  -hold  hctz 12.5 mg qd  -imdur holding parameter  -monitor bp closely        Altered mental status and expressive aphasia -resolved now  -S/P CODE stroke called 11/29/24  -cth stable  -continue asa,plavix,statin  -tte stable  -mri brain pending        Acute on chronic HFmrEF exacerbation - now euvolemic on exam   Chest pain LIKELY SEC TO ABOVE  Hx of CAD s/p SHIRA (10/23),  - Telemetry  - S/P IV LASIX  - GDMT. hold hctz for hypotension  - NST positive  - S/P [facility] 11/29. STABLE  - Cardiology following  - TTE w/ LVEF of 45-50%, basal and mid anterior septum abnormal  - Trop 25-24  - ProBNP 707  - Monitor I&Os, daily weights, fluid/salt restriction  - SGLT2i on discharge  - s/p LH -- no intervention, DAPT x 3 months  -F/U CARDIO REC  -monitor bp closely      HLD  - Continue Statin  PT eval home    DVT ppx: Heparin SQ  Dispo: pending MRI brain. dc plan home  dw pt  dw cardiology team to review meds.

## 2024-12-03 NOTE — PROGRESS NOTE ADULT - PROVIDER SPECIALTY LIST ADULT
Hospitalist
Cardiology
Cardiology
Hospitalist
Intervent Cardiology
Cardiology
Cardiology

## 2024-12-03 NOTE — PROGRESS NOTE ADULT - NS ATTEND AMEND GEN_ALL_CORE FT
Code stroke just activated due to sudden aphasia/confusion, no focal deficit, taken to ct head stat. Otherwise cardiac stable, cont current regimen and follow up outpt with Dr Cortes.
79y old  Female PMH HTN, HLD, CAD with LAD stent in 2021; SHIRA of ostial LM 10/2023, Sarcoidosis by Vats per pt hx. Presents with one year on chest pain and dyspnea on exertion.  Post SHIRA 10/2023 she still had symptoms of SOB up till now. Today it was raining out and she was rushing to get into a building and she became so dyspneic employees called EMS. In regards to the chest pain it is epigastric and occasional left upper chest wall pain, only occurs with maximal exertion. She also endorses stop taking HCTZ as instructed by her ?cardiologist and ran out of statin. She is not the best historian. Echo revealed mild reduction in LVEF. NST was abnormal.    telemetry reivewed, discussed with patient vital signs, warning symptoms, alarm symptoms  c/w toprol xl, norvasc and sglt2  d/c imdur    we will follow peripherally
Patient seen and examined by me.    I have discussed my recommendation with the PA which are outlined above.  Will follow.
mild reduction in LVEF on 2d TTE    restart some optimal GDMT (ARB)  recommend SGLT2i on discharge    plan for NST to evaluate underlying ischemia, patient agrees    if no ischemia on NST, optimized for close follow up with primary cardiologist on discharge

## 2024-12-03 NOTE — PROGRESS NOTE ADULT - SUBJECTIVE AND OBJECTIVE BOX
[facility]                                                         CARDIOLOGY AT [place] [address]                                                         Telephone: [phone]. Fax:[phone]                                                                             PROGRESS NOTE    Chief Complaint upon presentation to hospital: SOB  Initial reason for Consult: SOB  Reason for follow up: Hypotension s/p cath, on multiple medications  Overall Plan: BP management    Review of symptoms:   Cardiac:  No chest pain. No dyspnea. No palpitations.  Respiratory: no cough. No dyspnea  Gastrointestinal: No diarrhea. No abdominal pain. No bleeding.   Neuro: No focal neuro complaints.    Vitals:  T(C): 36.6 (12-03-24 @ 07:27), Max: 37.2 (12-02-24 @ 19:21)  HR: 70 (12-03-24 @ 07:27) (65 - 94)  BP: 137/68 (12-03-24 @ 07:27) (80/50 - 137/68)  RR: 18 (12-03-24 @ 07:27) (18 - 20)  SpO2: 93% (12-03-24 @ 07:27) (93% - 100%)  Wt(kg): --  I&O's Summary    02 Dec 2024 07:01  -  03 Dec 2024 07:00  --------------------------------------------------------  IN: 500 mL / OUT: 0 mL / NET: 500 mL      Weight (kg): 88.6 (11-29 @ 07:08)    PHYSICAL EXAM:  Appearance: Comfortable. No acute distress  HEENT:  Atraumatic. Normocephalic.  Normal oral mucosa  Neurologic: A & O x 3, no gross focal deficits.  Cardiovascular: RRR S1 S2, No murmur, no rubs/gallops. No JVD  Respiratory: Lungs clear to auscultation, unlabored   Gastrointestinal:  Soft, Non-tender, + BS  Lower Extremities: 2+ Peripheral Pulses, No clubbing, cyanosis, or edema  Psychiatry: Patient is calm. No agitation.   Skin: warm and dry.    CURRENT CARDIAC MEDICATIONS:  amLODIPine   Tablet 5 milliGRAM(s) Oral daily  metoprolol succinate ER 12.5 milliGRAM(s) Oral daily      CURRENT OTHER MEDICATIONS:  acetaminophen     Tablet .. 650 milliGRAM(s) Oral every 6 hours PRN Temp greater or equal to 38C (100.4F), Mild Pain (1 - 3)  meclizine 25 milliGRAM(s) Oral three times a day PRN for dizziness  melatonin 3 milliGRAM(s) Oral at bedtime PRN Insomnia  OLANZapine Injectable 5 milliGRAM(s) IntraMuscular every 6 hours PRN Agitation  ondansetron Injectable 4 milliGRAM(s) IV Push every 8 hours PRN Nausea and/or Vomiting  aluminum hydroxide/magnesium hydroxide/simethicone Suspension 30 milliLiter(s) Oral every 4 hours PRN Dyspepsia  bisacodyl Suppository 10 milliGRAM(s) Rectal daily PRN Constipation  magnesium hydroxide Suspension 30 milliLiter(s) Oral daily PRN Constipation  atorvastatin 40 milliGRAM(s) Oral at bedtime  dapagliflozin 5 milliGRAM(s) Oral daily  aspirin  chewable 81 milliGRAM(s) Oral daily  clopidogrel Tablet 75 milliGRAM(s) Oral daily  heparin   Injectable 5000 Unit(s) SubCutaneous every 12 hours  sodium chloride 0.9%. 1000 milliLiter(s) (100 mL/Hr) IV Continuous <Continuous>, Stop order after: 6 Hours      LABS:	 	                            10.4   2.55  )-----------( 159      ( 02 Dec 2024 05:50 )             32.9     12-03    140  |  106  |  17.4  ----------------------------<  85  4.0   |  25.0  |  0.99    Ca    8.6      03 Dec 2024 10:47      PT/INR/PTT ( 29 Nov 2024 19:20 )                       :                       :      11.6         :       35.4                  .        .                   .              .           .       1.00        .                                       Lipid Profile: Date: 11-30 @ 17:22  Total cholesterol 200; Direct LDL: --; HDL: 88; Triglycerides:46

## 2024-12-03 NOTE — DISCHARGE NOTE NURSING/CASE MANAGEMENT/SOCIAL WORK - PATIENT PORTAL LINK FT
You can access the FollowMyHealth Patient Portal offered by [facility] by registering at the following website: [email or web address]. By joining "[employer]’s FollowMyHealth portal, you will also be able to view your health information using other applications (apps) compatible with our system.

## 2024-12-03 NOTE — PROGRESS NOTE ADULT - SUBJECTIVE AND OBJECTIVE BOX
Patient is a 79y old  Female who presents with a chief complaint of Chest pain (02 Dec 2024 08:47)    Pt denies any cp,sob,dizziness.    REVIEW OF SYSTEMS: All systems are reviewed and found to be negative except above    MEDICATIONS  (STANDING):  amLODIPine   Tablet 5 milliGRAM(s) Oral daily  aspirin  chewable 81 milliGRAM(s) Oral daily  atorvastatin 40 milliGRAM(s) Oral at bedtime  clopidogrel Tablet 75 milliGRAM(s) Oral daily  dapagliflozin 5 milliGRAM(s) Oral daily  heparin   Injectable 5000 Unit(s) SubCutaneous every 12 hours  isosorbide   mononitrate ER Tablet (IMDUR) 30 milliGRAM(s) Oral daily  metoprolol succinate ER 12.5 milliGRAM(s) Oral daily  sodium chloride 0.9%. 1000 milliLiter(s) (100 mL/Hr) IV Continuous <Continuous>    MEDICATIONS  (PRN):  acetaminophen     Tablet .. 650 milliGRAM(s) Oral every 6 hours PRN Temp greater or equal to 38C (100.4F), Mild Pain (1 - 3)  aluminum hydroxide/magnesium hydroxide/simethicone Suspension 30 milliLiter(s) Oral every 4 hours PRN Dyspepsia  bisacodyl Suppository 10 milliGRAM(s) Rectal daily PRN Constipation  magnesium hydroxide Suspension 30 milliLiter(s) Oral daily PRN Constipation  meclizine 25 milliGRAM(s) Oral three times a day PRN for dizziness  melatonin 3 milliGRAM(s) Oral at bedtime PRN Insomnia  OLANZapine Injectable 5 milliGRAM(s) IntraMuscular every 6 hours PRN Agitation  ondansetron Injectable 4 milliGRAM(s) IV Push every 8 hours PRN Nausea and/or Vomiting      CAPILLARY BLOOD GLUCOSE        I&O's Summary    02 Dec 2024 07:01  -  03 Dec 2024 07:00  --------------------------------------------------------  IN: 500 mL / OUT: 0 mL / NET: 500 mL        PHYSICAL EXAM:  Vital Signs Last 24 Hrs  T(C): 36.6 (03 Dec 2024 07:27), Max: 37.2 (02 Dec 2024 19:21)  T(F): 97.9 (03 Dec 2024 07:27), Max: 99 (02 Dec 2024 19:21)  HR: 70 (03 Dec 2024 07:27) (65 - 94)  BP: 137/68 (03 Dec 2024 07:27) (80/50 - 137/68)  BP(mean): 82 (02 Dec 2024 16:00) (76 - 82)  RR: 18 (03 Dec 2024 07:27) (18 - 20)  SpO2: 93% (03 Dec 2024 07:27) (93% - 100%)    Parameters below as of 03 Dec 2024 07:27  Patient On (Oxygen Delivery Method): room air        CONSTITUTIONAL: NAD,  EYES: PERRLA; conjunctiva and sclera clear  ENMT: Moist oral mucosa,   RESPIRATORY: Normal respiratory effort; lungs are clear to auscultation bilaterally  CARDIOVASCULAR: Regular rate and rhythm, normal S1 and S2, no murmur   EXTS: No lower extremity edema; Peripheral pulses are 2+ bilaterally  ABDOMEN: Nontender to palpation, normoactive bowel sounds, no rebound/guarding;   MUSCLOSKELETAL:    no joint swelling or tenderness to palpation  PSYCH: affect appropriate  NEUROLOGY: A+O to person, place, and time; CN 2-12 are intact and symmetric; no gross sensory deficits;       LABS:                        10.4   2.55  )-----------( 159      ( 02 Dec 2024 05:50 )             32.9     12-02    139  |  104  |  17.0  ----------------------------<  87  3.9   |  24.0  |  1.00    Ca    9.2      02 Dec 2024 05:50            Urinalysis Basic - ( 02 Dec 2024 05:50 )    Color: x / Appearance: x / SG: x / pH: x  Gluc: 87 mg/dL / Ketone: x  / Bili: x / Urobili: x   Blood: x / Protein: x / Nitrite: x   Leuk Esterase: x / RBC: x / WBC x   Sq Epi: x / Non Sq Epi: x / Bacteria: x          RADIOLOGY & ADDITIONAL TESTS:  Results Reviewed:

## 2024-12-03 NOTE — DISCHARGE NOTE NURSING/CASE MANAGEMENT/SOCIAL WORK - FINANCIAL ASSISTANCE
[facility] provides services at a reduced cost to those who are determined to be eligible through [facility]’s financial assistance program. Information regarding [facility]’s financial assistance program can be found by going to [email or web address] or by calling [phone].

## 2024-12-03 NOTE — DISCHARGE NOTE NURSING/CASE MANAGEMENT/SOCIAL WORK - NSDCPEFALRISK_GEN_ALL_CORE
For information on Fall & Injury Prevention, visit: [email or web address] OR  [email or web address] OR  https://www.cdc.gov/steadi/patient.html

## 2024-12-06 ENCOUNTER — NON-APPOINTMENT (OUTPATIENT)
Age: 79
End: 2024-12-06

## 2025-01-15 NOTE — ASU PREOP CHECKLIST - IDENTIFICATION BAND VERIFIED
[FreeTextEntry1] : Symptoms relatively mild and either viral URI versus allergies. Recommend continuing Claritin-D as well as Flonase and adding Mucinex DM. \Call if symptoms persist or worsen. done

## 2025-04-22 ENCOUNTER — OFFICE (OUTPATIENT)
Dept: URBAN - METROPOLITAN AREA CLINIC 103 | Facility: CLINIC | Age: 80
Setting detail: OPHTHALMOLOGY
End: 2025-04-22
Payer: MEDICARE

## 2025-04-22 DIAGNOSIS — H25.13: ICD-10-CM

## 2025-04-22 PROCEDURE — 92004 COMPRE OPH EXAM NEW PT 1/>: CPT | Performed by: OPTOMETRIST

## 2025-04-22 ASSESSMENT — TONOMETRY
OS_IOP_MMHG: 18
OD_IOP_MMHG: 17

## 2025-04-22 ASSESSMENT — CONFRONTATIONAL VISUAL FIELD TEST (CVF)
OD_FINDINGS: FULL
OS_FINDINGS: FULL

## 2025-04-22 ASSESSMENT — REFRACTION_AUTOREFRACTION
OD_SPHERE: UTP
OS_SPHERE: +0.50
OS_AXIS: 066
OS_CYLINDER: -0.25

## 2025-04-22 ASSESSMENT — VISUAL ACUITY
OD_BCVA: 20/40
OS_BCVA: 20/FC 3FT

## 2025-04-22 ASSESSMENT — KERATOMETRY
OD_K2POWER_DIOPTERS: 44.75
OD_K1POWER_DIOPTERS: 43.75
OS_K1POWER_DIOPTERS: 43.50
OS_AXISANGLE_DEGREES: 002
OD_AXISANGLE_DEGREES: 031
OS_K2POWER_DIOPTERS: 44.25

## 2025-05-13 NOTE — ED PROVIDER NOTE - NEURO NEGATIVE STATEMENT, MLM
CT of the RLE read discussed with Dr. Hamilton, no need for further imaging with MRI. Plan is for non-op/conservative management with RLE NWB and knee immobilizer in place. no loss of consciousness, no gait abnormality, no headache, no sensory deficits, and no weakness.

## 2025-05-16 ENCOUNTER — OFFICE (OUTPATIENT)
Dept: URBAN - METROPOLITAN AREA CLINIC 103 | Facility: CLINIC | Age: 80
Setting detail: OPHTHALMOLOGY
End: 2025-05-16
Payer: MEDICARE

## 2025-05-16 DIAGNOSIS — H25.13: ICD-10-CM

## 2025-05-16 DIAGNOSIS — H25.043: ICD-10-CM

## 2025-05-16 DIAGNOSIS — H25.013: ICD-10-CM

## 2025-05-16 DIAGNOSIS — H25.11: ICD-10-CM

## 2025-05-16 PROBLEM — H25.12 CATARACT NUCLEAR SCLEROSIS AGE RELATED; RIGHT EYE, LEFT EYE, BOTH EYES: Status: ACTIVE | Noted: 2025-05-16

## 2025-05-16 PROCEDURE — 92136 OPHTHALMIC BIOMETRY: CPT | Mod: TC | Performed by: OPHTHALMOLOGY

## 2025-05-16 PROCEDURE — 92136 OPHTHALMIC BIOMETRY: CPT | Mod: RT | Performed by: OPHTHALMOLOGY

## 2025-05-16 PROCEDURE — 92012 INTRM OPH EXAM EST PATIENT: CPT | Performed by: OPHTHALMOLOGY

## 2025-05-16 ASSESSMENT — KERATOMETRY
OS_CYLAXISANGLE_DEGREES: 002
OD_AXISANGLE_DEGREES: 026
OS_K2POWER_DIOPTERS: 44.50
OS_AXISANGLE2_DEGREES: 002
OS_AXISANGLE_DEGREES: 92
OS_K1POWER_DIOPTERS: 44.00
OS_K2POWER_DIOPTERS: 44.50
OD_K1POWER_DIOPTERS: 44.00
OD_CYLPOWER_DEGREES: 0.75
OD_K1POWER_DIOPTERS: 44.00
OS_K1K2_AVERAGE: 44.25
OD_K2POWER_DIOPTERS: 44.75
OD_CYLAXISANGLE_DEGREES: 026
OD_AXISANGLE2_DEGREES: 026
OD_K2POWER_DIOPTERS: 44.75
OD_K1K2_AVERAGE: 44.375
OS_AXISANGLE_DEGREES: 002
OD_AXISANGLE_DEGREES: 116
OS_CYLPOWER_DEGREES: 0.5
OS_K1POWER_DIOPTERS: 44.00

## 2025-05-16 ASSESSMENT — CONFRONTATIONAL VISUAL FIELD TEST (CVF)
OS_FINDINGS: FULL
OD_FINDINGS: FULL

## 2025-05-16 ASSESSMENT — TONOMETRY
OS_IOP_MMHG: 18
OD_IOP_MMHG: 17

## 2025-05-16 ASSESSMENT — VISUAL ACUITY
OD_BCVA: 20/40
OS_BCVA: CF 3FT

## 2025-05-16 ASSESSMENT — REFRACTION_AUTOREFRACTION
OD_SPHERE: UTP
OS_SPHERE: UTP
